# Patient Record
Sex: FEMALE | Race: WHITE | NOT HISPANIC OR LATINO | Employment: FULL TIME | URBAN - METROPOLITAN AREA
[De-identification: names, ages, dates, MRNs, and addresses within clinical notes are randomized per-mention and may not be internally consistent; named-entity substitution may affect disease eponyms.]

---

## 2018-04-24 ENCOUNTER — OFFICE VISIT (OUTPATIENT)
Dept: URGENT CARE | Facility: CLINIC | Age: 33
End: 2018-04-24
Payer: COMMERCIAL

## 2018-04-24 VITALS
SYSTOLIC BLOOD PRESSURE: 120 MMHG | DIASTOLIC BLOOD PRESSURE: 88 MMHG | RESPIRATION RATE: 18 BRPM | WEIGHT: 120 LBS | BODY MASS INDEX: 23.56 KG/M2 | HEART RATE: 85 BPM | OXYGEN SATURATION: 100 % | TEMPERATURE: 98 F | HEIGHT: 60 IN

## 2018-04-24 DIAGNOSIS — N30.01 ACUTE CYSTITIS WITH HEMATURIA: Primary | ICD-10-CM

## 2018-04-24 DIAGNOSIS — T50.905A ADVERSE EFFECT OF DRUG, INITIAL ENCOUNTER: ICD-10-CM

## 2018-04-24 LAB
BILIRUB UR QL STRIP: NEGATIVE
GLUCOSE UR QL STRIP: NEGATIVE
KETONES UR QL STRIP: POSITIVE
LEUKOCYTE ESTERASE UR QL STRIP: POSITIVE
PH, POC UA: 7 (ref 5–8)
POC BLOOD, URINE: NEGATIVE
POC NITRATES, URINE: NEGATIVE
PROT UR QL STRIP: NEGATIVE
SP GR UR STRIP: 1.01 (ref 1–1.03)
UROBILINOGEN UR STRIP-ACNC: POSITIVE (ref 0.1–1.1)

## 2018-04-24 PROCEDURE — 99203 OFFICE O/P NEW LOW 30 MIN: CPT | Mod: 25,S$GLB,, | Performed by: EMERGENCY MEDICINE

## 2018-04-24 PROCEDURE — 81003 URINALYSIS AUTO W/O SCOPE: CPT | Mod: QW,S$GLB,, | Performed by: EMERGENCY MEDICINE

## 2018-04-24 RX ORDER — DEXTROAMPHETAMINE SACCHARATE, AMPHETAMINE ASPARTATE, DEXTROAMPHETAMINE SULFATE AND AMPHETAMINE SULFATE 5; 5; 5; 5 MG/1; MG/1; MG/1; MG/1
20 TABLET ORAL 3 TIMES DAILY
COMMUNITY
Start: 2018-03-27

## 2018-04-24 RX ORDER — ESCITALOPRAM OXALATE 20 MG/1
20 TABLET ORAL DAILY
Refills: 0 | COMMUNITY
Start: 2018-04-06

## 2018-04-24 RX ORDER — CEPHALEXIN 500 MG/1
500 CAPSULE ORAL EVERY 12 HOURS
Qty: 6 CAPSULE | Refills: 0 | Status: SHIPPED | OUTPATIENT
Start: 2018-04-24 | End: 2018-04-27

## 2018-04-24 NOTE — PROGRESS NOTES
Subjective:       Patient ID: Syl Elise is a 32 y.o. female.    Vitals:  height is 5' (1.524 m) and weight is 54.4 kg (120 lb). Her oral temperature is 98.2 °F (36.8 °C). Her blood pressure is 120/88 and her pulse is 85. Her respiration is 18 and oxygen saturation is 100%.     Chief Complaint: Dysuria and Dizziness    Patient states she took leftover BACTRIM from a friend for UTI symptoms. She states her symptoms are better but she has been light headed and fatigue since taking the medication. She reports her father is allergic to Bactrim.       Dysuria    This is a new problem. The current episode started in the past 7 days (Friday). The problem occurs intermittently. The problem has been resolved. The pain is at a severity of 0/10. The patient is experiencing no pain. There has been no fever. She is sexually active. There is no history of pyelonephritis. Associated symptoms include frequency, nausea and urgency. Pertinent negatives include no chills, hematuria, vomiting or rash. She has tried antibiotics and home medications for the symptoms. The treatment provided significant relief.   Fatigue   This is a new problem. The current episode started in the past 7 days (Saturday). The problem occurs constantly. The problem has been unchanged. Associated symptoms include abdominal pain, fatigue, headaches and nausea. Pertinent negatives include no chest pain, chills, fever, rash, sore throat or vomiting. Associated symptoms comments: Dizzy/Lightheaded. Nothing aggravates the symptoms. She has tried nothing for the symptoms.     Review of Systems   Constitution: Positive for fatigue and malaise/fatigue. Negative for chills and fever.   HENT: Negative for sore throat.    Eyes: Negative for blurred vision.   Cardiovascular: Negative for chest pain.   Respiratory: Negative for shortness of breath.    Skin: Negative for itching and rash.   Musculoskeletal: Negative for back pain and joint pain.   Gastrointestinal:  Positive for abdominal pain and nausea. Negative for diarrhea and vomiting.   Genitourinary: Positive for dysuria, frequency and urgency. Negative for genital sores, hematuria, missed menses and non-menstrual bleeding.   Neurological: Positive for headaches.   Psychiatric/Behavioral: The patient is not nervous/anxious.        Objective:      Physical Exam   Constitutional: She is oriented to person, place, and time. She appears well-developed and well-nourished. She is cooperative.  Non-toxic appearance. She does not appear ill. No distress.   HENT:   Head: Normocephalic and atraumatic.   Right Ear: Hearing, tympanic membrane, external ear and ear canal normal.   Left Ear: Hearing, tympanic membrane, external ear and ear canal normal.   Nose: Nose normal. No mucosal edema, rhinorrhea or nasal deformity. No epistaxis. Right sinus exhibits no maxillary sinus tenderness and no frontal sinus tenderness. Left sinus exhibits no maxillary sinus tenderness and no frontal sinus tenderness.   Mouth/Throat: Uvula is midline, oropharynx is clear and moist and mucous membranes are normal. No trismus in the jaw. Normal dentition. No uvula swelling. No posterior oropharyngeal erythema.   Eyes: Conjunctivae and lids are normal. Right eye exhibits no discharge. Left eye exhibits no discharge. No scleral icterus.   Sclera clear bilat   Neck: Trachea normal, normal range of motion, full passive range of motion without pain and phonation normal. Neck supple.   Cardiovascular: Normal rate, regular rhythm, normal heart sounds, intact distal pulses and normal pulses.    Pulmonary/Chest: Effort normal and breath sounds normal. No respiratory distress.   Abdominal: Soft. Normal appearance and bowel sounds are normal. She exhibits no distension, no pulsatile midline mass and no mass. There is no tenderness.   Musculoskeletal: Normal range of motion. She exhibits no edema or deformity.   Neurological: She is alert and oriented to person,  place, and time. She exhibits normal muscle tone. Coordination normal.   Skin: Skin is warm, dry and intact. She is not diaphoretic. No pallor.   Psychiatric: She has a normal mood and affect. Her speech is normal and behavior is normal. Judgment and thought content normal. Cognition and memory are normal.   Nursing note and vitals reviewed.      Assessment:       1. Acute cystitis with hematuria    2. Adverse effect of drug, initial encounter        Plan:         Acute cystitis with hematuria  -     POCT Urinalysis, Dipstick, Automated, W/O Scope  -     cephALEXin (KEFLEX) 500 MG capsule; Take 1 capsule (500 mg total) by mouth every 12 (twelve) hours.  Dispense: 6 capsule; Refill: 0    Adverse effect of drug, initial encounter

## 2018-05-04 ENCOUNTER — OFFICE VISIT (OUTPATIENT)
Dept: URGENT CARE | Facility: CLINIC | Age: 33
End: 2018-05-04
Payer: COMMERCIAL

## 2018-05-04 VITALS
OXYGEN SATURATION: 100 % | HEIGHT: 60 IN | SYSTOLIC BLOOD PRESSURE: 117 MMHG | WEIGHT: 120 LBS | TEMPERATURE: 99 F | BODY MASS INDEX: 23.56 KG/M2 | HEART RATE: 70 BPM | DIASTOLIC BLOOD PRESSURE: 80 MMHG

## 2018-05-04 DIAGNOSIS — R30.0 DYSURIA: Primary | ICD-10-CM

## 2018-05-04 LAB
B-HCG UR QL: NEGATIVE
BILIRUB UR QL STRIP: NEGATIVE
CTP QC/QA: YES
GLUCOSE UR QL STRIP: NEGATIVE
KETONES UR QL STRIP: POSITIVE
LEUKOCYTE ESTERASE UR QL STRIP: NEGATIVE
PH, POC UA: 6.5 (ref 5–8)
POC BLOOD, URINE: POSITIVE
POC NITRATES, URINE: NEGATIVE
PROT UR QL STRIP: NEGATIVE
SP GR UR STRIP: 1.02 (ref 1–1.03)
UROBILINOGEN UR STRIP-ACNC: NORMAL (ref 0.1–1.1)

## 2018-05-04 PROCEDURE — 99214 OFFICE O/P EST MOD 30 MIN: CPT | Mod: 25,S$GLB,, | Performed by: NURSE PRACTITIONER

## 2018-05-04 PROCEDURE — 87086 URINE CULTURE/COLONY COUNT: CPT

## 2018-05-04 PROCEDURE — 81003 URINALYSIS AUTO W/O SCOPE: CPT | Mod: QW,S$GLB,, | Performed by: NURSE PRACTITIONER

## 2018-05-04 PROCEDURE — 81025 URINE PREGNANCY TEST: CPT | Mod: S$GLB,,, | Performed by: NURSE PRACTITIONER

## 2018-05-04 RX ORDER — PHENAZOPYRIDINE HYDROCHLORIDE 200 MG/1
200 TABLET, FILM COATED ORAL 3 TIMES DAILY PRN
Qty: 6 TABLET | Refills: 0 | Status: SHIPPED | OUTPATIENT
Start: 2018-05-04 | End: 2018-05-06

## 2018-05-04 NOTE — PROGRESS NOTES
Subjective:       Patient ID: Syl Elise is a 32 y.o. female.    Vitals:  height is 5' (1.524 m) and weight is 54.4 kg (120 lb). Her temperature is 98.7 °F (37.1 °C). Her blood pressure is 117/80 and her pulse is 70. Her oxygen saturation is 100%.     Chief Complaint: Urinary Tract Infection    Patient presents with c/o dysuria x1 day.  She states that she thinks that she has a UTI.  She also started her menstrual cycle today so she is having some cramping, but this is normal for her.  She denies vaginal discharge, frequency, urgency, flank pain, fever, N/V.  She does not have an OBGYN.  She completed Keflex for a UTI 7 days ago.  She has not been on any antibiotics since.  She is not concerned with an STD as she states that she had a full panel less than 2 months ago.        Urinary Tract Infection    This is a new problem. The current episode started yesterday. The problem occurs every urination. The problem has been unchanged. The quality of the pain is described as burning. The pain is at a severity of 5/10. The pain is mild. There has been no fever. She is sexually active. There is no history of pyelonephritis. Pertinent negatives include no behavior changes, chills, discharge, flank pain, frequency, hematuria, nausea, possible pregnancy, urgency, vomiting or rash. She has tried antibiotics for the symptoms. The treatment provided mild relief. Her past medical history is significant for recurrent UTIs. There is no history of kidney stones.     Review of Systems   Constitution: Negative for chills, decreased appetite and fever.   Skin: Negative for itching and rash.   Musculoskeletal: Negative for back pain.   Gastrointestinal: Negative for abdominal pain, nausea and vomiting.   Genitourinary: Positive for dysuria and pelvic pain (associated with her period). Negative for flank pain, frequency, genital sores, hematuria, missed menses, non-menstrual bleeding and urgency.       Objective:      Physical Exam    Constitutional: She is oriented to person, place, and time. Vital signs are normal. She appears well-developed and well-nourished.  Non-toxic appearance. She does not appear ill. No distress.   HENT:   Head: Normocephalic and atraumatic.   Right Ear: External ear normal.   Left Ear: External ear normal.   Nose: Nose normal. No nasal deformity. No epistaxis.   Mouth/Throat: Oropharynx is clear and moist and mucous membranes are normal.   Eyes: Conjunctivae and lids are normal.   Neck: Trachea normal, normal range of motion and phonation normal. Neck supple.   Cardiovascular: Normal rate, regular rhythm, normal heart sounds and normal pulses.    Pulmonary/Chest: Effort normal and breath sounds normal.   Abdominal: Soft. Normal appearance and bowel sounds are normal. She exhibits no distension and no mass. There is no tenderness. There is no rigidity, no rebound, no guarding and no CVA tenderness.   Genitourinary:   Genitourinary Comments: Patient deferred   Neurological: She is alert and oriented to person, place, and time.   Skin: Skin is warm, dry and intact.   Psychiatric: She has a normal mood and affect. Her speech is normal and behavior is normal. Cognition and memory are normal.   Nursing note and vitals reviewed.      Assessment:       1. Dysuria        Plan:       Dysuria likely due to starting her menstrual cycle.  Will run urine culture.  Pyridium for symptoms.  Advised to increase water intake and avoid irritants such as body soaps, alcohol, caffeine, and spicy foods.  Follow up with OBGYN for continued symptoms for pelvic exam and possibly urology.  Dysuria  -     POCT Urinalysis, Dipstick, Automated, W/O Scope  -     POCT URINE PREGNANCY  -     phenazopyridine (PYRIDIUM) 200 MG tablet; Take 1 tablet (200 mg total) by mouth 3 (three) times daily as needed for Pain.  Dispense: 6 tablet; Refill: 0  -     Urine culture  -     Ambulatory Referral to Gynecology      Patient Instructions                                 If your condition worsens or fails to improve we recommend that you receive another evaluation at the ER immediately or contact your PCP to discuss your concerns or return here. You must understand that you've received an urgent care treatment only and that you may be released before all your medical problems are known or treated. You the patient will arrange for followup care as instructed.   If you had cultures done it will take 3-5 days to result. We will call you with the result.   Cranberry juice may help. Get the 100% cranberry juice and mix 4 oz of juice with 4 oz of water and drink this 8 oz glass of liquid once a day.   Increase water intake to at least 8-10 glasses/day.  Do not wear contacts with Pyridium as it will stain them.  You may want to wear a panty liner with Pyridium as it may stain your underwear.  Avoid caffeine, alcohol, or spicy foods as they irritate the bladder.        Dysuria with Uncertain Cause (Adult)    The urethra is the tube that allows urine to pass out of the body. In a woman, the urethra is the opening above the vagina. In men, the urethra is the opening on the tip of the penis. Dysuria is the feeling of pain or burning in the urethra when passing urine.  Dysuria can be caused by anything that irritates or inflames the urethra. An infection or chemical irritation can cause this reaction. A bladder infection is the most common cause of dysuria in adults. A urine test can diagnose this. A bladder infection needs antibiotic treatment.  Soaps, lotions, colognes and feminine hygiene products can cause dysuria. So can birth control jellies, creams, and foams. It will go away 1 to 3 days after using these irritants.  Sexually transmitted diseases (STDs) such as chlamydia or gonorrhea can cause dysuria. Your healthcare provider may take a culture sample. Your provider may start you on antibiotic medicine before the culture test returns.  In women who have gone through menopause,  dysuria can be from dryness in the lining of the urethra. This can be treated with hormones. Dysuria becomes long-term (chronic) when it lasts for weeks or months. You may need to see a specialist (urologist) to diagnose and treat chronic dysuria.  Home care  These home care tips may help:  · Don't use any chemicals or products that you think may be causing your symptoms.  · If you were given a prescription medicine, take as directed. Be sure to take it until it is all used up.  · If a culture was taken, don't have sex until you have been told that it is negative. This means you don't have an infection. Then follow your healthcare provider's advice to treat your condition.  If a culture was done and it is positive:  · Both you and your sexual partner may need to be treated. This is true even if your partner has no symptoms.  · Contact your healthcare provider or go to an urgent care clinic or the public health department to be looked at and treated.  · Don't have sex until both you and your partner(s) have finished all antibiotics and your healthcare provider says you are no longer contagious.  · Learn about and use safe sex practices. The safest sex is with a partner who has tested negative and only has sex with you. Condoms can prevent STDs from spreading, but they aren't a guarantee.  Follow-up care  Follow up with your healthcare provider, or as advised. If a culture was taken, you may call as directed for the results. If you have an STD, follow up with your provider or the public health department for a complete STD screening, including HIV testing. For more information, contact CDC-INFO at 455-189-0431.  When to seek medical advice  Call your healthcare provider right away if any of these occur:  · You aren't better after 3 days of treatment  · Fever of 100.4ºF (38ºC) or higher, or as directed by your healthcare provider  · Back or belly pain that gets worse  · You can't urinate because of pain  · New  discharge from the urethra, vagina, or penis  · Painful sores on the penis  · Rash or joint pain  · Painful lumps (lymph nodes) in the groin  · Testicle pain or swelling of the scrotum  Date Last Reviewed: 11/1/2016  © 7093-7317 [employer]. [address] [place] [address]. All rights reserved. This information is not intended as a substitute for professional medical care. Always follow your healthcare professional's instructions.

## 2018-05-04 NOTE — PATIENT INSTRUCTIONS
If your condition worsens or fails to improve we recommend that you receive another evaluation at the ER immediately or contact your PCP to discuss your concerns or return here. You must understand that you've received an urgent care treatment only and that you may be released before all your medical problems are known or treated. You the patient will arrange for followup care as instructed.   If you had cultures done it will take 3-5 days to result. We will call you with the result.   Cranberry juice may help. Get the 100% cranberry juice and mix 4 oz of juice with 4 oz of water and drink this 8 oz glass of liquid once a day.   Increase water intake to at least 8-10 glasses/day.  Do not wear contacts with Pyridium as it will stain them.  You may want to wear a panty liner with Pyridium as it may stain your underwear.  Avoid caffeine, alcohol, or spicy foods as they irritate the bladder.        Dysuria with Uncertain Cause (Adult)    The urethra is the tube that allows urine to pass out of the body. In a woman, the urethra is the opening above the vagina. In men, the urethra is the opening on the tip of the penis. Dysuria is the feeling of pain or burning in the urethra when passing urine.  Dysuria can be caused by anything that irritates or inflames the urethra. An infection or chemical irritation can cause this reaction. A bladder infection is the most common cause of dysuria in adults. A urine test can diagnose this. A bladder infection needs antibiotic treatment.  Soaps, lotions, colognes and feminine hygiene products can cause dysuria. So can birth control jellies, creams, and foams. It will go away 1 to 3 days after using these irritants.  Sexually transmitted diseases (STDs) such as chlamydia or gonorrhea can cause dysuria. Your healthcare provider may take a culture sample. Your provider may start you on antibiotic medicine before the culture test returns.  In women who have gone  through menopause, dysuria can be from dryness in the lining of the urethra. This can be treated with hormones. Dysuria becomes long-term (chronic) when it lasts for weeks or months. You may need to see a specialist (urologist) to diagnose and treat chronic dysuria.  Home care  These home care tips may help:  · Don't use any chemicals or products that you think may be causing your symptoms.  · If you were given a prescription medicine, take as directed. Be sure to take it until it is all used up.  · If a culture was taken, don't have sex until you have been told that it is negative. This means you don't have an infection. Then follow your healthcare provider's advice to treat your condition.  If a culture was done and it is positive:  · Both you and your sexual partner may need to be treated. This is true even if your partner has no symptoms.  · Contact your healthcare provider or go to an urgent care clinic or the public health department to be looked at and treated.  · Don't have sex until both you and your partner(s) have finished all antibiotics and your healthcare provider says you are no longer contagious.  · Learn about and use safe sex practices. The safest sex is with a partner who has tested negative and only has sex with you. Condoms can prevent STDs from spreading, but they aren't a guarantee.  Follow-up care  Follow up with your healthcare provider, or as advised. If a culture was taken, you may call as directed for the results. If you have an STD, follow up with your provider or the public health department for a complete STD screening, including HIV testing. For more information, contact CDC-INFO at 338-829-7077.  When to seek medical advice  Call your healthcare provider right away if any of these occur:  · You aren't better after 3 days of treatment  · Fever of 100.4ºF (38ºC) or higher, or as directed by your healthcare provider  · Back or belly pain that gets worse  · You can't urinate because of  pain  · New discharge from the urethra, vagina, or penis  · Painful sores on the penis  · Rash or joint pain  · Painful lumps (lymph nodes) in the groin  · Testicle pain or swelling of the scrotum  Date Last Reviewed: 11/1/2016  © 4322-8685 [employer]. [address] [place] [address]. All rights reserved. This information is not intended as a substitute for professional medical care. Always follow your healthcare professional's instructions.

## 2018-05-07 LAB — BACTERIA UR CULT: NORMAL

## 2018-05-09 ENCOUNTER — TELEPHONE (OUTPATIENT)
Dept: URGENT CARE | Facility: CLINIC | Age: 33
End: 2018-05-09

## 2018-05-14 ENCOUNTER — OFFICE VISIT (OUTPATIENT)
Dept: OBSTETRICS AND GYNECOLOGY | Facility: CLINIC | Age: 33
End: 2018-05-14
Attending: OBSTETRICS & GYNECOLOGY
Payer: COMMERCIAL

## 2018-05-14 VITALS
HEIGHT: 60 IN | DIASTOLIC BLOOD PRESSURE: 70 MMHG | SYSTOLIC BLOOD PRESSURE: 110 MMHG | BODY MASS INDEX: 21.03 KG/M2 | WEIGHT: 107.13 LBS

## 2018-05-14 DIAGNOSIS — N39.0 RECURRENT UTI: ICD-10-CM

## 2018-05-14 DIAGNOSIS — R30.0 DYSURIA: ICD-10-CM

## 2018-05-14 DIAGNOSIS — N39.0 UTI (URINARY TRACT INFECTION), UNCOMPLICATED: ICD-10-CM

## 2018-05-14 DIAGNOSIS — Z01.411 ENCOUNTER FOR GYNECOLOGICAL EXAMINATION WITH ABNORMAL FINDING: Primary | ICD-10-CM

## 2018-05-14 PROCEDURE — 99999 PR PBB SHADOW E&M-EST. PATIENT-LVL III: CPT | Mod: PBBFAC,,, | Performed by: OBSTETRICS & GYNECOLOGY

## 2018-05-14 PROCEDURE — 99203 OFFICE O/P NEW LOW 30 MIN: CPT | Mod: S$GLB,,, | Performed by: OBSTETRICS & GYNECOLOGY

## 2018-05-14 PROCEDURE — 87086 URINE CULTURE/COLONY COUNT: CPT

## 2018-05-14 PROCEDURE — 87491 CHLMYD TRACH DNA AMP PROBE: CPT

## 2018-05-14 PROCEDURE — 81001 URINALYSIS AUTO W/SCOPE: CPT

## 2018-05-14 RX ORDER — VALACYCLOVIR HYDROCHLORIDE 500 MG/1
500 TABLET, FILM COATED ORAL DAILY
Refills: 0 | COMMUNITY
Start: 2018-05-08 | End: 2018-05-14

## 2018-05-14 RX ORDER — NITROFURANTOIN 25; 75 MG/1; MG/1
100 CAPSULE ORAL 2 TIMES DAILY
Refills: 0 | COMMUNITY
Start: 2018-05-08 | End: 2018-06-15

## 2018-05-14 NOTE — PROGRESS NOTES
Syl Elise is a 32 y.o. female patient  NEW TO ME who presents today with complaints of recurrent UTI's since November. She has had a UTI in Late November and treated with Macrobid, then had another one in mid April for which she was seen at an urgent care clinic, and treated with Bactrim. After about 3 days, she stopped the Bactrim due to muscle aches and feeling weird. She was then switched to Keflex for the completion of the treatment. She then developed symptoms of burning with urination, aches and fullness /pressure in the bladder a week ago, and she was given a prescription for macrobid by her friend's mother . She is on her last day of Macrobid for the treatment.     Patient's last menstrual period was 2018.    Past Medical History:   Diagnosis Date    Abnormal Pap smear of cervix     LEEP done     Past Surgical History:   Procedure Laterality Date    CERVICAL BIOPSY  W/ LOOP ELECTRODE EXCISION       Social History     Social History    Marital status: Single     Spouse name: N/A    Number of children: N/A    Years of education: N/A     Occupational History    Not on file.     Social History Main Topics    Smoking status: Former Smoker    Smokeless tobacco: Former User    Alcohol use Yes      Comment: 1 drink a week sometimes     Drug use: No    Sexual activity: Yes     Partners: Male     Birth control/ protection: Condom      Comment: current partner x 1 month     Other Topics Concern    Not on file     Social History Narrative    No narrative on file     Family History   Problem Relation Age of Onset    Diabetes Maternal Grandmother     Hypertension Mother     Breast cancer Neg Hx     Colon cancer Neg Hx     Ovarian cancer Neg Hx      OB History      Para Term  AB Living    0 0 0 0 0 0    SAB TAB Ectopic Multiple Live Births    0 0 0 0 0                ROS:  GENERAL: Feeling well overall.   SKIN: Denies rash or lesions.   HEAD: Denies head injury or  headache.   NODES: Denies enlarged lymph nodes.   CHEST: Denies chest pain or shortness of breath.   CARDIOVASCULAR: Denies palpitations or left sided chest pain.   ABDOMEN: No abdominal pain, nausea, vomiting or rectal bleeding.   URINARY: No dysuria, hematuria, or burning on urination.  REPRODUCTIVE: See HPI.   BREASTS: Denies pain, lumps, or nipple discharge.   HEMATOLOGIC: No easy bruisability or excessive bleeding.   MUSCULOSKELETAL: Denies joint pain or swelling.   NEUROLOGIC: Denies syncope or weakness.   PSYCHIATRIC: Denies depression.    /70   Ht 5' (1.524 m)   Wt 48.6 kg (107 lb 2.3 oz)   LMP 05/04/2018   BMI 20.93 kg/m²     PE:   APPEARANCE: Well nourished, well developed, in no acute distress.  ABDOMEN: Soft. No tenderness or masses. No hernias. No CVA tenderness.  VULVA: No lesions. Normal female genitalia.  URETHRAL MEATUS: Normal size and location, no lesions, no prolapse.  URETHRA: No masses, tenderness, prolapse or scarring.  VAGINA: Moist and well rugated, no discharge, no significant cystocele or rectocele.  CERVIX: No lesions and discharge.   UTERUS: Normal size, regular shape, mobile, non-tender, bladder base  MILDLY tender.  ADNEXA: No masses, tenderness or CDS nodularity.  ANUS PERINEUM: Normal.    PROCEDURES:    1. Encounter for gynecological examination with abnormal finding     2. UTI (urinary tract infection), uncomplicated     3. Dysuria  Urine culture    Urinalysis    C. trachomatis/N. gonorrhoeae by AMP DNA Cervix   4. Recurrent UTI  Ambulatory consult to Urogynecology    AND PLAN:      Follow-up if symptoms worsen or fail to improve.

## 2018-05-15 LAB
BACTERIA #/AREA URNS AUTO: ABNORMAL /HPF
BILIRUB UR QL STRIP: NEGATIVE
C TRACH DNA SPEC QL NAA+PROBE: NOT DETECTED
CLARITY UR REFRACT.AUTO: ABNORMAL
COLOR UR AUTO: ABNORMAL
GLUCOSE UR QL STRIP: NEGATIVE
HGB UR QL STRIP: NEGATIVE
KETONES UR QL STRIP: ABNORMAL
LEUKOCYTE ESTERASE UR QL STRIP: NEGATIVE
MICROSCOPIC COMMENT: ABNORMAL
N GONORRHOEA DNA SPEC QL NAA+PROBE: NOT DETECTED
NITRITE UR QL STRIP: NEGATIVE
PH UR STRIP: 5 [PH] (ref 5–8)
PROT UR QL STRIP: NEGATIVE
RBC #/AREA URNS AUTO: 21 /HPF (ref 0–4)
SP GR UR STRIP: 1.03 (ref 1–1.03)
SQUAMOUS #/AREA URNS AUTO: 4 /HPF
URN SPEC COLLECT METH UR: ABNORMAL
UROBILINOGEN UR STRIP-ACNC: NEGATIVE EU/DL
WBC #/AREA URNS AUTO: 8 /HPF (ref 0–5)

## 2018-05-16 LAB — BACTERIA UR CULT: NORMAL

## 2018-05-17 ENCOUNTER — PATIENT MESSAGE (OUTPATIENT)
Dept: OBSTETRICS AND GYNECOLOGY | Facility: CLINIC | Age: 33
End: 2018-05-17

## 2018-05-17 DIAGNOSIS — N39.0 RECURRENT UTI: Primary | ICD-10-CM

## 2018-05-18 ENCOUNTER — TELEPHONE (OUTPATIENT)
Dept: UROGYNECOLOGY | Facility: CLINIC | Age: 33
End: 2018-05-18

## 2018-06-15 ENCOUNTER — LAB VISIT (OUTPATIENT)
Dept: LAB | Facility: HOSPITAL | Age: 33
End: 2018-06-15
Attending: OBSTETRICS & GYNECOLOGY
Payer: COMMERCIAL

## 2018-06-15 ENCOUNTER — INITIAL CONSULT (OUTPATIENT)
Dept: UROGYNECOLOGY | Facility: CLINIC | Age: 33
End: 2018-06-15
Attending: OBSTETRICS & GYNECOLOGY
Payer: COMMERCIAL

## 2018-06-15 VITALS
SYSTOLIC BLOOD PRESSURE: 100 MMHG | WEIGHT: 108.25 LBS | BODY MASS INDEX: 21.25 KG/M2 | HEIGHT: 60 IN | DIASTOLIC BLOOD PRESSURE: 72 MMHG

## 2018-06-15 DIAGNOSIS — R30.0 DYSURIA: ICD-10-CM

## 2018-06-15 DIAGNOSIS — R39.15 URINARY URGENCY: ICD-10-CM

## 2018-06-15 DIAGNOSIS — R35.0 URINARY FREQUENCY: ICD-10-CM

## 2018-06-15 DIAGNOSIS — M79.18 MYALGIA OF PELVIC FLOOR: ICD-10-CM

## 2018-06-15 DIAGNOSIS — F32.A ANXIETY AND DEPRESSION: ICD-10-CM

## 2018-06-15 DIAGNOSIS — F41.9 ANXIETY AND DEPRESSION: ICD-10-CM

## 2018-06-15 DIAGNOSIS — R30.0 DYSURIA: Primary | ICD-10-CM

## 2018-06-15 DIAGNOSIS — Z87.440 HISTORY OF UTI: ICD-10-CM

## 2018-06-15 PROCEDURE — 99999 PR PBB SHADOW E&M-EST. PATIENT-LVL IV: CPT | Mod: PBBFAC,,, | Performed by: OBSTETRICS & GYNECOLOGY

## 2018-06-15 PROCEDURE — 87086 URINE CULTURE/COLONY COUNT: CPT

## 2018-06-15 PROCEDURE — 99245 OFF/OP CONSLTJ NEW/EST HI 55: CPT | Mod: 25,S$GLB,, | Performed by: OBSTETRICS & GYNECOLOGY

## 2018-06-15 PROCEDURE — 87798 DETECT AGENT NOS DNA AMP: CPT | Mod: 91

## 2018-06-15 PROCEDURE — 51701 INSERT BLADDER CATHETER: CPT | Mod: S$GLB,,, | Performed by: OBSTETRICS & GYNECOLOGY

## 2018-06-15 PROCEDURE — 87798 DETECT AGENT NOS DNA AMP: CPT

## 2018-06-15 RX ORDER — FEXOFENADINE HCL 60 MG
60 TABLET ORAL DAILY
COMMUNITY
End: 2018-09-14

## 2018-06-15 NOTE — LETTER
Sana 15, 2018      Carol Hays MD  [address]  [address]  [facility] [address]           [facility]  [address]  [facility] [address]  Phone: [phone]          Patient: Syl Elise   MR Number: [id number]   YOB: 1985   Date of Visit: 6/15/2018       Dear Dr. Carol Hays:    Thank you for referring Syl Elise to me for evaluation. Attached you will find relevant portions of my assessment and plan of care.    If you have questions, please do not hesitate to call me. I look forward to following Syl Elise along with you.    Sincerely,    Romulo Neville MD    Enclosure  CC:  No Recipients    If you would like to receive this communication electronically, please contact [email or web address] or [phone] to request more information on [employer] access.    For providers and/or their staff who would like to refer a patient to [facility], please contact us through our one-stop-shop provider referral line, [facility] , at [phone].    If you feel you have received this communication in error or would no longer like to receive these types of communications, please e-mail [email or web address]

## 2018-06-15 NOTE — PATIENT INSTRUCTIONS
UTI PREVENTION: (from [employer] Continence)  Urinary Tract Infection (UTI)  More than 4 million doctor office visits per year in the [place] are for urinary tract infections (UTI). About 12% of men and 50% of women will have a UTI during his or her lifetime. Most UTIs arise from bacteria that normally live in the colon and rectum and are present in bowel movements. These bacteria cling to the opening of the urethra, begin to multiply, & travel up to the bladder. Urine flow from the bladder usually washes bacteria out of the body. However, because women have a shorter urethra than men, bacteria can reach the bladder more easily and settle into the bladder wall. This is why women are more likely to develop UTIs than men. This risk factor is exacerbated by the greater likelihood that women introduce bacteria from fecal matter, following a bowel movement, into the urinary tract. Less often, bacteria can spread to the kidney from the bloodstream. A recurrent UTI is classified as three or more a year.  Risk Factors for UTI  Several factors can contribute to the risk of UTI. Sexual intercourse, use of contraceptive spermicide, low levels of estrogen, catheterization, diabetes, pregnancy, and immune suppression increase susceptibility to UTI.  Naturally occurring estrogen helps prevent recurrent UTI in women. After menopause, estrogen levels drop along with the number of vaginal lactobacilli, the good bacteria which prevent growth of intestinal bacteria in the vagina. This makes postmenopausal women especially susceptible to UTI.  Catheters also present a risk of recurring UTI. Catheters are associated with colonization of bacteria and increased risks of clinical infection. Using the techniques described previously can help keep catheters clean and prevent recurrent UTI. While single-use of sterile catheters reduces the risks, it does not prevent UTI from occurring. It is therefore important to  maintain proper care and use of catheters at all times while remaining alert to symptoms of UTI.  Common Symptoms of UTI   Painful urination   Frequency   Urgency   Lower abdominal or pelvic pain or pressure   Blood in the urine   Milky, cloudy, or pink/red urine   Fever   Strong smelling urine  In the elderly populations, symptoms of a urinary tract infection, can be easily overlooked, causing a delay in diagnosis. Elderly people with a UTI are more likely than younger people not to be diagnosed until the complication of sepsis occurs. The elderly often do not experience or report obvious symptoms that younger people have, such as difficult urination, or frequent urination. Instead they may exhibit far more vague symptoms that are similiar to many disease or may be assumed to be due to the aging process. These symptoms include: confusion, feelings of general discomfort, disorientation, fatigue, weakness, behavior changes, falling, and/or a new, acute incontinence. In addition, because incontinence is common in the elderly, they may not be aware of the symptom of frequency. If you experience any of these symptoms, see your healthcare professional.  Types of UTIs   Cystitis - an inflammation of the bladder and the most common UTI. Almost always, it occurs in women. The infection typically affects only the surface of the bladder and is brief & acute.   Pyelonephritis - more commonly known as a kidney infection and usually occurs when a lower UTI spreads to the kidneys. Occasionally, bacteria will spread to the kidney from the bloodstresam. Kidney infections are more serious and less common than bladder infections. Symptoms include a fever, pain in the back or side below the ribs, nausea, or vomiting.   Urethritis - is an inflammation of the urethra. Men commonly contract urethritis through sexual intercourse with partners infected with sexually transmitted infections. Urethritis may also result from trauma  to the area or from the catheterization process.  Prevention of UTI  There are several ways to prevent bladder infections.  Bathroom Behavior:Periodically emptying the bladder by trying to urinate every two to three hours.  Diet:The use of cranberry products seems to decrease the ability of bacteria to adhere to the lining of the urethra and bladder. As cranberry juice can have a high amount of sugar, cranberry extract can be taken in capsule or pill form instead. Increasing water intake by one or two glasses per day may help limit the length of time that you have symptoms and reduce the infections.  Hygiene: Proper hygiene in caring for the urethral area is another way to limit the amount or type of bacteria that can be drawn into the urethra. This is especially true in people who have decreased sensation in the perineal region such as those with MS or who experience any amount of fecal incontinence. Using soap & water or commercially available cleansing wipes several times per day & frequent changing of incontinence pads as they become wet can minimize the amount of bacteria in the urethral area. Women should always wipe from the front of the vagina to the back of the anus after urination or a bowel movement and wear cotton underwear. It is also reported that wearing thong undergarments may increase one's risk of developing an infection.  Sexual Activity: Can be the source of UTIs in women. Insuring proper lubrication to the vagina and voiding before and after intercourse are tactics to help prevent infection. Using diaphragms and spermicidal jelly and/or foam may increase the risk of infection, so it is important to evaluate what type of birth control you use. Some physicians encourage women who have a history of recurrent infections to take an prophylactic antibiotic after intercourse, as it reduces risk of recurrent UTI by about 85%. At a minimum, restrict your number of sexual partners and urinate  immediately after sexual intimacy to lower the risk of recurrent UTIs.  Vaginal Estrogen: reduces risk of recurrent UTI by repopulating the normal vaginal lactobacilli that keep bacteria from the rectum from multiplying and causing a bladder infection. Forms of vaginal estrogen are available at very low dosages that have minimal systemic absorption.  Catheter Use: proper cleansing and storage of catheters is important in one who intermittently catheterizes, as the catheter can be a vehicle to introduce infection if the technique is incorrect or if the catheters are not properly cleaned between each use. A closed system catheter provides a reliable means of sterile IC because the introducer tip is surrounded by a urine collection bag and never exposed to bacteria typically found at the urethral opening. This greatly reduces the risk of infection.  NOTE: Vaginal estrogens and antibiotics are medications that need to be prescribed by your healthcare provider.  Treatment of UTI  Depending on the severity of infection, UTI can be treated with oral antibiotics. A three-day course of antibiotics can treat a simple UTI. However, some infections may need to be treated for several days or weeks. Length of antibiotic treatment also depends on the type of antibiotic prescribed.  Even if a few doses of medication relieve some symptoms, you should still complete the full course of medication prescribed by your doctor. Taking aspirin, Tylenol, or non-steroidal, anti-inflammatory medications may reduce symptoms during this episode, as they may be a result of increased body temperature.  For more information regarding UTIs please visit: http://www.ncbi.nlm.nih.gov/pubmedhealth/GEY6214221/    ---------------------------------------------------------------------    Bladder Irritants  Certain foods and drinks have been associated with worsening symptoms of urinary frequency, urgency, urge incontinence, or bladder pain. If you  suffer from any of these conditions, you may wish to try eliminating one or more of these foods from your diet and see if your symptoms improve. If bladder symptoms are related to dietary factors, strict adherence to a diet thateliminates the food should bring marked relief in 10 days. Once you are feeling better, you can begin to add foods back into your diet, one at a time. If symptoms return, you will be able to identify the irritant. As you add foods back to your diet it is very important that you drink significant amounts of water.    -----------------------------------------------------------------------------------------------  List of Common Bladder Irritants*  Alcoholic beverages  Apples and apple juice  Cantaloupe  Carbonated beverages  Chili and spicy foods  Chocolate  Citrus fruit  Coffee (including decaffeinated)  Cranberries and cranberry juice  Grapes  Guava  Milk Products: milk, cheese, cottage cheese, yogurt, ice cream  Peaches  Pineapple  Plums  Strawberries  Sugar especially artificial sweeteners, saccharin, aspartame, corn sweeteners, honey, fructose, sucrose, lactose  Tea  Tomatoes and tomato juice  Vitamin B complex  Vinegar  *Most people are not sensitive to ALL of these products; your goal is to find the foods that make YOUR symptoms worse.  ---------------------------------------------------------------------------------------------------    Low-acid fruit substitutions include apricots, papaya, pears and watermelon. Coffee drinkers can drink Kava or other lowacid instant drinks. Tea drinkers can substitute non-citrus herbal and sun brewed teas. Calcium carbonate co-buffered with calcium ascorbate can be substituted for Vitamin C. Prelief is a dietary supplement that works as an acid blocker for the bladder.    Where to get more information:        Overcoming Bladder Disorders by Gayel Bryson and Ira Rogers   [facility], 1990        You Dont Have to Live with Cystitis!  By Yuliana Carrizales, 1988  · http://www.urologymanagement.org/oab    -----------------------------------------------------------------------    1)  Frequent UTIs (bladder infections):  --urine C&S sent today  --ureaplasma/myoplasma sent  --If you feel like you have a UTI, please call our office so that we can place an order for you to drop off a urine specimen at the closest [facility] lab (we will arrange).     --We will call in antibiotics for you to start right after you drop off specimen.     --In this way, we can determine:     1)  Do you have a UTI?      2) If you have a UTI, is it sensitive to the antibiotics we prescribed?   FOR FLARES:  Can take uribel up to 4x/day x 5 days.  If persists past that, call us.   --follow UTI prevention tips (see attached)  --control bowel movements/fecal cross-contamination  --empty bladder before and after intercourse  --start taking 1 probiotic pill (any with lactobacillus and/or acidophilus) daily  --consider need for further evaluation (pelvic imaging and cystoscopy) if issue persists    If Irritative voiding symptoms:  --Empty bladder every 3 hours.  Empty well: wait a minute, lean forward on toilet.    --Avoid dietary irritants (see sheet).  Keep diary x 3-5 days to determine your irritants.  --start pelvic floor PT:    [facility]:  1)  Adele Bassett ([facility]): (p) [phone]. (f) [phone]. Established patients call:  [phone]  2)  Lauren Shepley ([facility]  (p) [phone].  Or [phone].   NON-[facility]:  3)  Francisca Mireles ([facility]): (p) [phone].  (f) [phone].    --URGE: consider medication daily (anticholinergic).  Takes 2-4 weeks to see if will have effect.  For dry mouth: get sour, sugar free lozenge or gum.    --control stress/anxiety, cognitive behavioral therapy:    [facility] (ART)  **Lorrie Waters  [address] [place]    [address] [phone]  [email or web address]    [facility]:  Trinity Abarca -    [phone]     [facility]:  Dr. Billingsley Head-Geraldine -    [phone]     Bassam Milian [facility], [facility]  (p) [phone]  [address] [place]    [address]    Delmy Perez [facility]  (p) [phone]  [address]   [facility] [address]  [place]     [address]    Aaliyah Lorenzo and her cell phone number is [phone].     [facility] [phone].      Dr. Balaji [facility]  (p) [phone]  [address].   [address]  [place]     [address]  Website:  [email or web address]    [facility]  Mental Health Facility in [place], [place]  Address:  [address] [place] [address]  phone: [phone]    3)  RTC 3 months.

## 2018-06-15 NOTE — PROGRESS NOTES
[facility] [address] [place] [address]    Syl Elise  [id number]  1985  Sana 15, 2018    Consulting Physician: Carol Hays MD   GYN: MD Saúl  Primary M.D.: Primary Doctor No    Chief Complaint   Patient presents with    Consult     Recurrent UTI       HPI:     1)  Reported frequent UTIs:  Per GYN note 5/14/18:  complaints of recurrent UTI's since November. She has had a UTI in Late November 2017 ([facility] in [place]) and treated with Macrobid--not clear if C&S sent, then had another one in mid April for which she was seen at an urgent care clinic--doesn't sound like C&S sent, and treated with Bactrim. After about 3 days, she stopped the Bactrim due to muscle aches and feeling weird. She was then switched to Keflex for the completion of the treatment.  Was then seen by UC for persistent UTI symptoms, but she was told she didn't have UTI per urine dip.  Was Rx'd pyridium--helped symptoms, except for fullness.  She was given a prescription for macrobid by her friend's mother.   5/14/18 urine C&S NEG. 5/14/18 chlam/caleb NEG.   --typical symptoms:  Burning at end of urination x 1 day, then increased U/F/bladder aching and fullness, urine malodor; no blood in urine  --no previous h/o UTIs   --no symptoms today  --inciting factors: intercourse; 2nd UTI was day before menses; happened after not sleeping well x 2 nights (has some chronic insomnia)  --has been trying to increase hydration, emptying bladder before/after intercourse  --anxiety/depression:  Has some baseline stress.  Feels that lexapro is helping with depression.  Thinks she needs to see a therapist.    --no baseline constipation/diarrhea; can have some stomach upset, laurel after ABX  --no UI; did have a habit of holding bladder for long periods--trying to void more frequently    Past Medical History  Past Medical History:   Diagnosis Date    Abnormal Pap smear of cervix 2004    LEEP done      Past  Surgical History  Past Surgical History:   Procedure Laterality Date    CERVICAL BIOPSY  W/ LOOP ELECTRODE EXCISION      EYE SURGERY  ,       Hysterectomy: No    Past Ob History      Gynecologic History  LMP: Patient's last menstrual period was 2018 (exact date).  Age of menarche: 12 yo  Age of menopause: NA  Menstrual history: monthly; has some dysmenorrhea x 1st 2 days, ibuprofen relieves; does have some MS  Pap test: 3/2018 ([facility]), normal per report.  History of abnormal paps: Yes - s/p LEEP ().  History of STIs:  No  Mammogram: none  Colonoscopy: Date of last:  (for stomach upset + EGD).  Result:  Normal per report.  Repeat due:  42 yo.    DEXA: none    Family History  Family History   Problem Relation Age of Onset    Diabetes Maternal Grandmother     Hypertension Mother     Breast cancer Neg Hx     Colon cancer Neg Hx     Ovarian cancer Neg Hx       Colon CA: No  Breast CA: No  GYN CA: No   CA: No    Social History  History   Smoking Status    Former Smoker   Smokeless Tobacco    Former User   --smoking history: a few weeks in     History   Alcohol Use    Yes     Comment: 1 drink a week sometimes    .    History   Drug Use No     The patient is single.  Resides in [place] [address].  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     Allergies  Review of patient's allergies indicates:   Allergen Reactions    Bactrim [sulfamethoxazole-trimethoprim] Other (See Comments)     Skin tingling, muscle aches/weakness       Medications  Current Outpatient Prescriptions on File Prior to Visit   Medication Sig Dispense Refill    dextroamphetamine-amphetamine (ADDERALL) 20 mg tablet 20 mg 3 (three) times daily.       escitalopram oxalate (LEXAPRO) 20 MG tablet Take 20 mg by mouth once daily.  0    [DISCONTINUED] nitrofurantoin, macrocrystal-monohydrate, (MACROBID) 100 MG capsule Take 100 mg by mouth 2 (two) times daily.  0     No current  facility-administered medications on file prior to visit.        Review of Systems A 14 point ROS was reviewed with pertinent positives as noted above in the history of present illness.      Constitutional: negative  Eyes: negative  Endocrine: negative  Gastrointestinal: negative  Cardiovascular: negative  Respiratory: negative  Allergic/Immunologic: negative  Integumentary: negative  Psychiatric: negative  Musculoskeletal: negative   Ear/Nose/Throat: negative  Neurologic: negative  Genitourinary: SEE HPI  Hematologic/Lymphatic: negative   Breast: negative    Urogynecologic Exam  /72 (BP Location: Right arm, Patient Position: Sitting, BP Method: Medium (Manual))   Ht 5' (1.524 m)   Wt 49.1 kg (108 lb 3.9 oz)   LMP 05/31/2018 (Exact Date)   BMI 21.14 kg/m²     GENERAL APPEARANCE:  The patient is well-developed, well-nourished.  Neck:  Supple with no thyromegaly, no carotid bruits.  Heart:  Regular rate and rhythm, no murmurs, rubs or gallops.  Lungs:  Clear.  No CVA tenderness.  Abdomen:  Soft, nontender, nondistended, no hepatosplenomegaly.  Incisions:  none    PELVIC:    External genitalia:  Normal Bartholins, Skenes and labia bilaterally.    Urethra:  No caruncle, diverticulum or masses.  (+) hypermobility.    Vagina:  Atrophy (--) , no bladder masses or tender, no discharge.  Mild TTP LV.    Cervix:  normal appearance  Uterus: uterus absent  Adnexa: Not palpable.    POP-Q:    Deferred.  No obvious POP present with valsalva.     NEUROLOGIC:  Cranial nerves 2 through 12 intact.  Strength 5/5.  DTRs 2+ lower extremities.  S2 through 4 normal.  Sacral reflexes intact.    EXT: SALVADOR, 2+ pulses bilaterally, no C/C/E    COUGH STRESS TEST:  negative  KEGEL: 1 /5    RECTAL:    External:  Normal, (--) hemorrhoids, (--) dovetailing.   Internal: deferred    PVR: 10 mL    Impression    1. Dysuria    2. Myalgia of pelvic floor    3. Urinary urgency    4. Urinary frequency    5. History of UTI    6. Anxiety and  depression        Initial Plan  The patient was counseled regarding these issues. The patient was given a summary sheet containing each of these issues with possible options for evaluation and management. When appropriate, we also reviewed computer-generated diagrams specific to their diagnoses..  All questions were addressed to the patient's satisfaction.    1)  Frequent UTIs (bladder infections):  --urine C&S sent today  --ureaplasma/myoplasma sent  --If you feel like you have a UTI, please call our office so that we can place an order for you to drop off a urine specimen at the closest [facility] lab (we will arrange).     --We will call in antibiotics for you to start right after you drop off specimen.     --In this way, we can determine:     1)  Do you have a UTI?      2) If you have a UTI, is it sensitive to the antibiotics we prescribed?   FOR FLARES:  Can take uribel up to 4x/day x 5 days.  If persists past that, call us.   --follow UTI prevention tips (see attached)  --control bowel movements/fecal cross-contamination  --empty bladder before and after intercourse  --start taking 1 probiotic pill (any with lactobacillus and/or acidophilus) daily  --consider need for further evaluation (pelvic imaging and cystoscopy) if issue persists    If Irritative voiding symptoms:  --Empty bladder every 3 hours.  Empty well: wait a minute, lean forward on toilet.    --Avoid dietary irritants (see sheet).  Keep diary x 3-5 days to determine your irritants.  --start pelvic floor PT:    [facility]:  1)  Adele Bassett ([facility]): (p) [phone]. (f) [phone]. Established patients call:  [phone]  2)  Lauren Shepley ([facility]).  (p) [phone].  Or [phone].   [facility]:  3)  Francisca Mireles ([facility] [facility] [employer]): (p) [phone].  (f) [phone].    --URGE: consider medication daily (anticholinergic).  Takes 2-4 weeks to see if will have effect.  For dry mouth: get sour,  sugar free lozenge or gum.    --control stress/anxiety, cognitive behavioral therapy:    [facility] (ART)  **Lorriejoanna Landa Waters  [address] [place], [place]    [phone]  [email or web address]    [facility]:  Trinity Abarca -    [phone]     [facility]:  Dr. Billingsley Head-Geraldine -    [phone]    [phone]     Bassam Milian [facility]  (p) [phone]  [address] [place]    [address]    Delmy Perez [facility]  (p) [phone]  [address]   [address]  [place]     [address]    Aaliyah Shelleyrefugio and her cell phone number is [phone].     [facility] [phone].      Dr. Balaji Hargrove [facility], [facility]  (p) [phone]  [address].   [address]  [place]     [address]  Website:  [email or web address]    [facility]  Mental Health Facility in [place], [place]  Address:  [address] [place] [address]  phone: [phone]    3)  RTC 3 months.     Approximately 60 min were spent in consult, 90 % in discussion.     Thank you for requesting consultation of your patient.  I look forward to participating in their care.    Romulo Neville  Female Pelvic Medicine and Reconstructive Surgery  [facility] [place], [place]

## 2018-06-16 LAB — BACTERIA UR CULT: NO GROWTH

## 2018-06-19 LAB
MYCOPLASMA GENITALIUM RESULT: NEGATIVE
SPECIMEN SOURCE: ABNORMAL
SPECIMEN SOURCE: NORMAL
U PARVUM DNA SPEC QL NAA+PROBE: POSITIVE
U UREALYTICUM DNA SPEC QL NAA+PROBE: POSITIVE

## 2018-06-20 ENCOUNTER — TELEPHONE (OUTPATIENT)
Dept: UROGYNECOLOGY | Facility: CLINIC | Age: 33
End: 2018-06-20

## 2018-06-20 DIAGNOSIS — Z22.39 CARRIER OF UREAPLASMA UREALYTICUM: Primary | ICD-10-CM

## 2018-06-20 RX ORDER — AZITHROMYCIN 250 MG/1
TABLET, FILM COATED ORAL
Qty: 6 TABLET | Refills: 0 | Status: SHIPPED | OUTPATIENT
Start: 2018-06-20 | End: 2018-06-26 | Stop reason: ALTCHOICE

## 2018-06-20 NOTE — TELEPHONE ENCOUNTER
Spoke with patient.  Let her know +ureaplasma.  ABX sent to pharmacy.  Advised to please still follow other instructions from last visit, including PT.  She voiced understanding.

## 2018-06-26 ENCOUNTER — OFFICE VISIT (OUTPATIENT)
Dept: INTERNAL MEDICINE | Facility: CLINIC | Age: 33
End: 2018-06-26
Attending: INTERNAL MEDICINE
Payer: COMMERCIAL

## 2018-06-26 VITALS
OXYGEN SATURATION: 98 % | HEIGHT: 59 IN | HEART RATE: 85 BPM | SYSTOLIC BLOOD PRESSURE: 110 MMHG | WEIGHT: 106.5 LBS | BODY MASS INDEX: 21.47 KG/M2 | DIASTOLIC BLOOD PRESSURE: 60 MMHG

## 2018-06-26 DIAGNOSIS — Z12.83 SKIN EXAM, SCREENING FOR CANCER: ICD-10-CM

## 2018-06-26 DIAGNOSIS — Z97.3 WEARS CONTACT LENSES: ICD-10-CM

## 2018-06-26 DIAGNOSIS — E53.8 B12 DEFICIENCY: ICD-10-CM

## 2018-06-26 DIAGNOSIS — Z00.00 ANNUAL PHYSICAL EXAM: Primary | ICD-10-CM

## 2018-06-26 DIAGNOSIS — E55.9 VITAMIN D DEFICIENCY: ICD-10-CM

## 2018-06-26 DIAGNOSIS — H50.9 STRABISMUS: ICD-10-CM

## 2018-06-26 DIAGNOSIS — M25.552 PAIN OF LEFT HIP JOINT: ICD-10-CM

## 2018-06-26 DIAGNOSIS — M85.89 OSTEOPENIA OF MULTIPLE SITES: ICD-10-CM

## 2018-06-26 PROCEDURE — 99385 PREV VISIT NEW AGE 18-39: CPT | Mod: S$GLB,,, | Performed by: INTERNAL MEDICINE

## 2018-06-26 PROCEDURE — 99999 PR PBB SHADOW E&M-EST. PATIENT-LVL V: CPT | Mod: PBBFAC,,, | Performed by: INTERNAL MEDICINE

## 2018-06-26 RX ORDER — MELOXICAM 15 MG/1
15 TABLET ORAL DAILY
Qty: 14 TABLET | Refills: 0 | Status: SHIPPED | OUTPATIENT
Start: 2018-06-26 | End: 2019-07-19

## 2018-06-26 NOTE — PROGRESS NOTES
Subjective:   Patient ID: Syl Elise is a 32 y.o. female  Chief complaint:   Chief Complaint   Patient presents with    [facility] Care       HPI  Pt here to [facility] care and for annual exam   Pt has hx or recurrent UTI's - was referred to urogyn  Testing included urine cx 6/15 which was ngtd  Testing + for ureaplasma - she completed azithro yesterday   rec pelvic floor PT which she is planning to start soon     Hx of ADD: Followed by Dr. Rene Rivera every 3 months   Has hx of adderall 20mg tid  Anxiety and depression: doing well on lexapro  Insomnia - > 10 years - tried different meds for ADD to see if would affect sleep less   - had sleep study at [facility] 2016 - no sleep apnea   Does not watch TV before bed - is on phone but working to reduce blue light exposure prior to bedtime   Tried benadryl, melatonin, ambien - not effective or caused odd SE  Took xanax x 2 doses and did not like way it made her feel  - thinks tried trazodone  Working to follow sleep hygiene     Hx of strabismus: had corrective surgery for this - not f/u in > 1 year     Has hx of intermittent left lateral dull achy nonradiating hip pain about 7-8 months ago - reports this was thought initially to be due to strain of gluteus minimus when went to PT x 6 months  Saw a sports med specialist and had xray hip and wnl per pt and MRI of pelvis - she never f/u on results of MRI but did  copy of disc. Had many issues with scheduling at [facility] and would prefer to f/u with new specialist at [facility].   Best improvement of sx was with dry needling   Is performing PT exercises at home    Hip started hurting again now 2 weeks ago - no trauma or inciting event  Mildly improves with ibuprofen  No numbness or weakness      Dx with osteopenia many years ago when had imaging for a reason now that she cannot recall.   Was anorexic in high school   Since dx she has been eating high calcium foods   Mom has hx of porosis   Reports hx of vit d def and B12  "def in past - took sl supplement for a while - not currently taking    Gyn: Dr. Hays    Review of Systems   Constitutional: Negative for activity change and unexpected weight change.   HENT: Negative for hearing loss, rhinorrhea and trouble swallowing.    Eyes: Negative for discharge and visual disturbance.   Respiratory: Negative for chest tightness and wheezing.    Cardiovascular: Negative for chest pain and palpitations.   Gastrointestinal: Negative for blood in stool, constipation, diarrhea and vomiting.   Endocrine: Positive for polyuria. Negative for polydipsia.   Genitourinary: Positive for dysuria. Negative for difficulty urinating, hematuria and menstrual problem.   Musculoskeletal: Positive for arthralgias. Negative for joint swelling and neck pain.   Neurological: Positive for weakness. Negative for headaches.   Psychiatric/Behavioral: Negative for confusion and dysphoric mood.       Objective:  Vitals:    06/26/18 1546   BP: 110/60   Pulse: 85   SpO2: 98%   Weight: 48.3 kg (106 lb 7.7 oz)   Height: 4' 11" (1.499 m)     Body mass index is 21.51 kg/m².    Physical Exam   Constitutional: She is oriented to person, place, and time. She appears well-developed and well-nourished.   HENT:   Head: Normocephalic and atraumatic.   Right Ear: External ear normal.   Left Ear: External ear normal.   Nose: Nose normal.   Mouth/Throat: Oropharynx is clear and moist. No oropharyngeal exudate.   No carotid bruits   Eyes: Conjunctivae and EOM are normal.   Neck: Neck supple. No thyromegaly present.   Cardiovascular: Normal rate, regular rhythm, normal heart sounds and intact distal pulses.    Pulmonary/Chest: Effort normal and breath sounds normal.   Abdominal: Soft. Bowel sounds are normal.   Musculoskeletal: She exhibits no edema or tenderness.   From of hips  No swelling or redness     Lymphadenopathy:     She has no cervical adenopathy.   Neurological: She is alert and oriented to person, place, and time.   Skin: " Skin is warm and dry.        Psychiatric: Her behavior is normal. Thought content normal.   Vitals reviewed.    Assessment:  1. Annual physical exam    2. Wears contact lenses    3. Strabismus    4. Osteopenia of multiple sites    5. Vitamin D deficiency    6. B12 deficiency    7. Pain of left hip joint    8. Skin exam, screening for cancer        Plan:  Syl was seen today for establish care.    Diagnoses and all orders for this visit:    Annual physical exam  -     DXA Bone Density Spine And Hip; Future  -     Vitamin D; Future  -     CBC auto differential; Future  -     Comprehensive metabolic panel; Future  -     TSH; Future  -     Lipid panel; Future  Recommend daily sunscreen, cardiovascular exercise min 30 min 5 days per week. Seatbelts routinely.    Wears contact lenses  -     Ambulatory Referral to Ophthalmology    Strabismus  -     Ambulatory Referral to Ophthalmology    Osteopenia of multiple sites  -     DXA Bone Density Spine And Hip; Future  rec otc supplement of calcium 1200mg and vit d 1000u divided into 2 doses daily along with reg exercise    Vitamin D deficiency  Check level - start otc supp    B12 deficiency  -     Vitamin B12; Future  Check level - resume supp if still low    Pain of left hip joint  -     ranitidine (ZANTAC) 75 MG tablet; Take 1 tablet (75 mg total) by mouth 2 (two) times daily.  -     meloxicam (MOBIC) 15 MG tablet; Take 1 tablet (15 mg total) by mouth once daily.  -     Ambulatory consult to Sports Medicine  Needs to f/u for results of MRI - she has a copy of imaging   Prefers to f/u with [facility] due to scheduling issues at [facility]  Will refer  Trial of mobic and zantac  Cont pt exercises    Skin exam, screening for cancer  -     Ambulatory Referral to Dermatology    Health Maintenance   Topic Date Due    Lipid Panel  1983    TETANUS VACCINE  11/13/2001    Pap Smear with HPV Cotest  11/13/2004    Influenza Vaccine  08/01/2018

## 2018-07-13 ENCOUNTER — HOSPITAL ENCOUNTER (OUTPATIENT)
Dept: RADIOLOGY | Facility: OTHER | Age: 33
Discharge: HOME | End: 2018-07-13
Attending: INTERNAL MEDICINE
Payer: COMMERCIAL

## 2018-07-13 DIAGNOSIS — M85.89 OSTEOPENIA OF MULTIPLE SITES: ICD-10-CM

## 2018-07-13 DIAGNOSIS — Z00.00 ANNUAL PHYSICAL EXAM: ICD-10-CM

## 2018-07-13 PROCEDURE — 77080 DXA BONE DENSITY AXIAL: CPT | Mod: 26,,, | Performed by: RADIOLOGY

## 2018-07-13 PROCEDURE — 77080 DXA BONE DENSITY AXIAL: CPT | Mod: TC

## 2018-07-17 ENCOUNTER — TELEPHONE (OUTPATIENT)
Dept: INTERNAL MEDICINE | Facility: CLINIC | Age: 33
End: 2018-07-17

## 2018-07-19 RX ORDER — ACETAMINOPHEN 500 MG
1 TABLET ORAL DAILY
COMMUNITY
Start: 2018-07-19

## 2018-08-03 ENCOUNTER — TELEPHONE (OUTPATIENT)
Dept: UROGYNECOLOGY | Facility: CLINIC | Age: 33
End: 2018-08-03

## 2018-08-03 NOTE — TELEPHONE ENCOUNTER
Attempted to contact pt regarding rescheduling her appointment on 9/20 at the [facility] location. Lm on voicemail.      Appointment rescheduled to 9/14 at 8:30a at the [facility] location. Appointment slip mailed

## 2018-08-31 ENCOUNTER — TELEPHONE (OUTPATIENT)
Dept: UROGYNECOLOGY | Facility: CLINIC | Age: 33
End: 2018-08-31

## 2018-08-31 ENCOUNTER — LAB VISIT (OUTPATIENT)
Dept: LAB | Facility: HOSPITAL | Age: 33
End: 2018-08-31
Attending: OBSTETRICS & GYNECOLOGY
Payer: COMMERCIAL

## 2018-08-31 DIAGNOSIS — R30.9 PAINFUL URINATION: ICD-10-CM

## 2018-08-31 DIAGNOSIS — R39.15 URINARY URGENCY: ICD-10-CM

## 2018-08-31 DIAGNOSIS — R39.15 URINARY URGENCY: Primary | ICD-10-CM

## 2018-08-31 LAB
BILIRUB UR QL STRIP: NEGATIVE
CLARITY UR REFRACT.AUTO: ABNORMAL
COLOR UR AUTO: ABNORMAL
GLUCOSE UR QL STRIP: NEGATIVE
HGB UR QL STRIP: NEGATIVE
KETONES UR QL STRIP: NEGATIVE
LEUKOCYTE ESTERASE UR QL STRIP: NEGATIVE
NITRITE UR QL STRIP: NEGATIVE
PH UR STRIP: 5 [PH] (ref 5–8)
PROT UR QL STRIP: NEGATIVE
SP GR UR STRIP: 1.01 (ref 1–1.03)
URN SPEC COLLECT METH UR: ABNORMAL
UROBILINOGEN UR STRIP-ACNC: NEGATIVE EU/DL

## 2018-08-31 PROCEDURE — 81003 URINALYSIS AUTO W/O SCOPE: CPT

## 2018-08-31 RX ORDER — NITROFURANTOIN (MACROCRYSTALS) 100 MG/1
100 CAPSULE ORAL 2 TIMES DAILY
Qty: 14 CAPSULE | Refills: 0 | Status: SHIPPED | OUTPATIENT
Start: 2018-08-31 | End: 2018-09-07

## 2018-08-31 NOTE — TELEPHONE ENCOUNTER
----- Message from Jane Rangel sent at 8/31/2018  1:07 PM CDT -----  Contact: pt  Name of Who is Calling: FENG STEIN [[id number]]    What is the request in detail: Patient is requesting a call back in regards to UTI symptoms,advised patient of upcoming appointment.....Please contact to further discuss and advise      Can the clinic reply by [facility]: No     What Number to Call Back if not in [facility]: [phone].

## 2018-08-31 NOTE — TELEPHONE ENCOUNTER
Spoke with pt who states that she is having some UTI symptoms, Pt is having urinary urgency with burning on urination. Pt denies pain, fever or nausea and vomiting. Pt is currently not taking any medication to help with symptoms and is scheduled to go out of town today. [place] but is requesting something be prescribed prior to culture coming back. Pt will contact office once she make it to [place] to give local pharmacy or medication can be prescribed to [facility] pharmacy here so that it can be transferred. Explain to pt that doctor is not in the office today but I will forward information and both doctor and NP and someone from the office will be in contact with her, pt voiced understanding and call was ended.

## 2018-08-31 NOTE — TELEPHONE ENCOUNTER
Spoke with patient.  Advised her to take pyridium 3x/day x next few days.  Sent in macrobid.  She can take this if still + symptoms after finishing macrobid.  Advised would like her to try to wait to take ABX, though, until C&S back unless symptoms really bad, as all previous C&S neg.  She has been stressed x 1 week.  Advised her to work on stress control, avoid dietary irritants, and control BMs, all of which can effect bladder irritability, not related to UTIs. She voiced understanding. She knows C&S won't be back until mid next week.   ------------------------------    In the future, if patient calls with UTI and would like treatment, if I'm not immediately available, can you please ask Hannah or Dr. BUITRAGO to help fill med or help Tx patient?  That way the message actually gets handled in a timely fashion.  Thank you :)

## 2018-09-03 ENCOUNTER — TELEPHONE (OUTPATIENT)
Dept: UROGYNECOLOGY | Facility: CLINIC | Age: 33
End: 2018-09-03

## 2018-09-03 DIAGNOSIS — N39.0 URINARY TRACT INFECTION WITHOUT HEMATURIA, SITE UNSPECIFIED: Primary | ICD-10-CM

## 2018-09-03 NOTE — TELEPHONE ENCOUNTER
Patient called recently with UTI symptoms.  Looks like UA is back, but C&S is there--not pending.  Did the lab not send it?  Please call the lab and find out what happened.  If it didn't get done, please let me know (we're sorry!), but she should try to drop off another urine for C&S when she's back in town.  Her basic UA looks negative, but the C&S is the only definitive way to check for infection.

## 2018-09-04 NOTE — TELEPHONE ENCOUNTER
Spoke to pt, she was informed that the lab only processed the UA and not the culture, pt verbalizes understanding. Pt states she can drop off another specimen on tomorrow 9/5 at the [place] location.

## 2018-09-14 ENCOUNTER — OFFICE VISIT (OUTPATIENT)
Dept: UROGYNECOLOGY | Facility: CLINIC | Age: 33
End: 2018-09-14
Payer: COMMERCIAL

## 2018-09-14 VITALS
HEIGHT: 60 IN | WEIGHT: 107.13 LBS | SYSTOLIC BLOOD PRESSURE: 80 MMHG | DIASTOLIC BLOOD PRESSURE: 62 MMHG | BODY MASS INDEX: 21.03 KG/M2

## 2018-09-14 DIAGNOSIS — F32.A ANXIETY AND DEPRESSION: ICD-10-CM

## 2018-09-14 DIAGNOSIS — R35.0 URINARY FREQUENCY: ICD-10-CM

## 2018-09-14 DIAGNOSIS — Z87.440 HISTORY OF UTI: ICD-10-CM

## 2018-09-14 DIAGNOSIS — R39.15 URINARY URGENCY: Primary | ICD-10-CM

## 2018-09-14 DIAGNOSIS — M79.18 MYALGIA OF PELVIC FLOOR: ICD-10-CM

## 2018-09-14 DIAGNOSIS — F41.9 ANXIETY AND DEPRESSION: ICD-10-CM

## 2018-09-14 PROCEDURE — 3008F BODY MASS INDEX DOCD: CPT | Mod: CPTII,S$GLB,, | Performed by: OBSTETRICS & GYNECOLOGY

## 2018-09-14 PROCEDURE — 99999 PR PBB SHADOW E&M-EST. PATIENT-LVL III: CPT | Mod: PBBFAC,,, | Performed by: OBSTETRICS & GYNECOLOGY

## 2018-09-14 PROCEDURE — 99213 OFFICE O/P EST LOW 20 MIN: CPT | Mod: S$GLB,,, | Performed by: OBSTETRICS & GYNECOLOGY

## 2018-09-14 RX ORDER — AMITRIPTYLINE HYDROCHLORIDE 10 MG/1
TABLET, FILM COATED ORAL
Qty: 60 TABLET | Refills: 11 | Status: SHIPPED | OUTPATIENT
Start: 2018-09-14 | End: 2018-12-21 | Stop reason: SDUPTHER

## 2018-09-14 NOTE — PROGRESS NOTES
[facility]  Urogyn follow up  09/15/2018    [facility]  [address] [id number]    Syl Elise  [id number]  1985      Syl Elise is a 32 y.o.  here for a urogyn follow up.    1)  Reported frequent UTIs:  Per GYN note 5/14/18:  complaints of recurrent UTI's since November. She has had a UTI in Late [place] 2017 ([place] and treated with Macrobid--not clear if C&S sent, then had another one in mid April for which she was seen at an urgent care clinic--doesn't sound like C&S sent, and treated with Bactrim. After about 3 days, she stopped the Bactrim due to muscle aches and feeling weird. She was then switched to Keflex for the completion of the treatment.  Was then seen by UC for persistent UTI symptoms, but she was told she didn't have UTI per urine dip.  Was Rx'd pyridium--helped symptoms, except for fullness.  She was given a prescription for macrobid by her friend's mother.   5/14/18 urine C&S NEG. 5/14/18 chlam/caleb NEG.   --typical symptoms:  Burning at end of urination x 1 day, then increased U/F/bladder aching and fullness, urine malodor; no blood in urine  --no previous h/o UTIs   --no symptoms today  --inciting factors: intercourse; 2nd UTI was day before menses; happened after not sleeping well x 2 nights (has some chronic insomnia)  --has been trying to increase hydration, emptying bladder before/after intercourse  --anxiety/depression:  Has some baseline stress.  Feels that lexapro is helping with depression.  Thinks she needs to see a therapist.    --no baseline constipation/diarrhea; can have some stomach upset, laurel after ABX  --no UI; did have a habit of holding bladder for long periods--trying to void more frequently    Past Medical History  Past Medical History:   Diagnosis Date    Abnormal Pap smear of cervix 2004    LEEP done    ADHD (attention deficit hyperactivity disorder)     Anxiety     Depression     Hip  pain     left Hip xray 2018 - minimal bilateral hip degeneration - mild osteophytosis within acetabula      CROW (obstructive sleep apnea) - mild 2016    sleep study from [facility] re-eval in clinic with further mgmt including ENT eval, conservative mgmt, or PAP therapy    Osteopenia     2016 on bone density - left femur T score -1.9    Strabismus     Vitamin D deficiency       Past Surgical History  Past Surgical History:   Procedure Laterality Date    CERVICAL BIOPSY  W/ LOOP ELECTRODE EXCISION      EYE SURGERY  ,     strabismus       Hysterectomy: No    Past Ob History      Gynecologic History  LMP: No LMP recorded.  Age of menarche: 12 yo  Age of menopause: NA  Menstrual history: monthly; has some dysmenorrhea x 1st 2 days, ibuprofen relieves; does have some MS  Pap test: 3/2018 ([facility]), normal per report.  History of abnormal paps: Yes - s/p ALICIA ().  History of STIs:  No  Mammogram: none  Colonoscopy: Date of last:  (for stomach upset + EGD).  Result:  Normal per report.  Repeat due:  42 yo.    DEXA: none    Issues include:  Patient Active Problem List   Diagnosis    Dysuria    Myalgia of pelvic floor    Urinary urgency    Urinary frequency    History of UTI    Anxiety and depression    Wears contact lenses    Osteopenia of multiple sites     History since last visit:     1)  Frequent UTIs (bladder infections):  --urine C&S ,  NEG; UA  neg  --ureaplasma/myoplasma weren't sent correctly; took azith empirically  --feels like things are improving  --not avoiding dietary irritants  --notes increase in flares with stress; working on meditation; has not contacted therapist  --attending pelvic floor PT ([facility]), which helps  FOR FLARES:  Has tried uribel--does help.     Medications:    Current Outpatient Medications:     cholecalciferol, vitamin D3, (VITAMIN D3) 2,000 unit Cap, Take 1 capsule (2,000 Units total) by mouth once daily.,  Disp: , Rfl:     dextroamphetamine-amphetamine (ADDERALL) 20 mg tablet, 20 mg 3 (three) times daily. , Disp: , Rfl:     escitalopram oxalate (LEXAPRO) 20 MG tablet, Take 20 mg by mouth once daily., Disp: , Rfl: 0    meloxicam (MOBIC) 15 MG tablet, Take 1 tablet (15 mg total) by mouth once daily., Disp: 14 tablet, Rfl: 0    methen-jaimeblue-s.phos-phsal-hyo 118-10-40.8-36 mg Cap, Take 1 capsule by mouth 4 (four) times daily as needed., Disp: 20 capsule, Rfl: 6    ranitidine (ZANTAC) 75 MG tablet, Take 1 tablet (75 mg total) by mouth 2 (two) times daily., Disp: 60 tablet, Rfl: 0    amitriptyline (ELAVIL) 10 MG tablet, 10 mg PO nightly x 7 days.  Add 10 mg every week until symptoms controlled. Max dose 50 mg PO nightly., Disp: 60 tablet, Rfl: 11    ROS:  As per HPI.      Exam  BP (!) 80/62 (BP Location: Left arm, Patient Position: Sitting)   Ht 5' (1.524 m)   Wt 48.6 kg (107 lb 2.3 oz)   BMI 20.93 kg/m²   General: alert and oriented, no acute distress  Remainder of PE deferred    Impression  1. Urinary urgency  amitriptyline (ELAVIL) 10 MG tablet   2. Urinary frequency  amitriptyline (ELAVIL) 10 MG tablet   3. Anxiety and depression     4. History of UTI     5. Myalgia of pelvic floor       We reviewed the above issues and discussed options for short-term versus long-term management of her problems.   Plan:   1)  Frequent UTIs (bladder infections):  --urine C&S sent today  --s/p empirical treatment for ureaplasma/myoplasma  --If you feel like you have a UTI, please call our office so that we can place an order for you to drop off a urine specimen at the closest [facility] lab (we will arrange).     --We will call in antibiotics for you to start right after you drop off specimen.     --In this way, we can determine:     1)  Do you have a UTI?      2) If you have a UTI, is it sensitive to the antibiotics we prescribed?   --start amitriptyline: 10 mg PO nightly x 7 days.  Add 10 mg every week until symptoms controlled.  Max dose 50 mg PO nightly.  FOR FLARES:  Can take uribel up to 4x/day x 5 days.  If persists past that, call us.   --follow UTI prevention tips (see attached)  --control bowel movements/fecal cross-contamination  --empty bladder before and after intercourse  --start taking 1 probiotic pill (any with lactobacillus and/or acidophilus) daily  --consider need for further evaluation (pelvic imaging and cystoscopy) if issue persists    If Irritative voiding symptoms:  --Empty bladder every 3 hours.  Empty well: wait a minute, lean forward on toilet.    --Avoid dietary irritants (see sheet).  Keep diary x 3-5 days to determine your irritants.  --can try prelief with meals, neutralizes acid, if having bad day or consuming a trigger  --continue pelvic floor PT with [facility]  --URGE: start amitriptyline: 10 mg PO nightly x 7 days.  Add 10 mg every week until symptoms controlled. Max dose 50 mg PO nightly. consider medication daily (anticholinergic).    --control stress/anxiety, cognitive behavioral therapy; let psych know that stress level has increased    [facility] (ART)  **Lorrie Waters  [address] [place], [place]    [id number] [phone]  [email or web address]    [facility] Services Authority:  Trinity Abarca -    [phone]    [phone]     [facility] Services District:  Dr. Billingsley Head-Geraldine -    [phone]     Bassam Milian [facility], FT  (p) [phone]  [address] [place]    [address]    Del[facility] Perez [facility]  (p) [phone]  [address]   [address]  [place]     [address]    Aaliyah Lorenzo and her cell phone number is [phone].     [facility] [phone].      Dr. Balaji Hargrove [facility], LMFT  (p) [phone]  [address]   [address]  [place]     [address]  Website:  [email or web address]    [facility] in [place], [place]  Address:  [address] [place] [address]  phone: [phone]    3)  RTC 3 months.     30 minutes were spent in face to face time with this patient  100 % of this time was spent in counseling and/or coordination of care     Romulo Neville MD  [facility]  Division of Female Pelvic Medicine and Reconstructive Surgery  Department of Obstetrics & Gynecology

## 2018-09-14 NOTE — PATIENT INSTRUCTIONS
1)  Frequent UTIs (bladder infections):  --urine C&S sent today  --s/p empirical treatment for ureaplasma/myoplasma  --If you feel like you have a UTI, please call our office so that we can place an order for you to drop off a urine specimen at the closest [facility] lab (we will arrange).     --We will call in antibiotics for you to start right after you drop off specimen.     --In this way, we can determine:     1)  Do you have a UTI?      2) If you have a UTI, is it sensitive to the antibiotics we prescribed?   --start amitriptyline: 10 mg PO nightly x 7 days.  Add 10 mg every week until symptoms controlled. Max dose 50 mg PO nightly.  FOR FLARES:  Can take uribel up to 4x/day x 5 days.  If persists past that, call us.   --follow UTI prevention tips (see attached)  --control bowel movements/fecal cross-contamination  --empty bladder before and after intercourse  --start taking 1 probiotic pill (any with lactobacillus and/or acidophilus) daily  --consider need for further evaluation (pelvic imaging and cystoscopy) if issue persists    If Irritative voiding symptoms:  --Empty bladder every 3 hours.  Empty well: wait a minute, lean forward on toilet.    --Avoid dietary irritants (see sheet).  Keep diary x 3-5 days to determine your irritants.  --can try prelief with meals, neutralizes acid, if having bad day or consuming a trigger  --continue pelvic floor PT with Franc[facility]a  --URGE: start amitriptyline: 10 mg PO nightly x 7 days.  Add 10 mg every week until symptoms controlled. Max dose 50 mg PO nightly. consider medication daily (anticholinergic).    --control stress/anxiety, cognitive behavioral therapy; let psych know that stress level has increased    [facility] ([facility])  **Lorrie Waters  [address] [place] [address]    [address]  [phone]  [email or web address]    [facility] Human Services Authority:  Trinity Abarca -     [phone]    [phone]     [facility] Services District:  Dr. Billingsley Head[facility] -    [phone]    [phone]     Bassam Milian [facility], FT  (p) [phone]  [address] [place]    [address]    Delmy Perez \[facility]\""W  (p) [phone]  [address]   [address]  [place]     [address]    Aaliyah Lorenzo and her cell phone number is [phone].     [facility] [phone].      Dr. Balaji Hargrove [facility], LMFT  (t) [phone]  [address]   [address]  [place]     [address]  Website:  [email or web address]    [facility]  Mental Health Facility in [place], [place]  Address:  [address] [place] [address]  phone: [phone]    3)  RTC 3 months.

## 2018-11-26 ENCOUNTER — TELEPHONE (OUTPATIENT)
Dept: UROGYNECOLOGY | Facility: CLINIC | Age: 33
End: 2018-11-26

## 2018-11-26 ENCOUNTER — PATIENT MESSAGE (OUTPATIENT)
Dept: UROGYNECOLOGY | Facility: CLINIC | Age: 33
End: 2018-11-26

## 2018-11-26 DIAGNOSIS — N30.00 ACUTE CYSTITIS WITHOUT HEMATURIA: Primary | ICD-10-CM

## 2018-11-26 NOTE — TELEPHONE ENCOUNTER
Spoke with pt. Who will drop off urine , and will try otc AZO as well. Order is in the system as well.

## 2018-11-26 NOTE — TELEPHONE ENCOUNTER
Dr Neville,     I think I have a UTI (or something, for that matter) and was wondering if I should go to an [facility] location to submit a urine sample for analysis? Kwasi been taking the supplement  d-mannose and the analgesic you prescribed for over a week, and its still not better. Please let me know how I should proceed. Thank you for your time and care!         Pt. Called with possible UTI again , X 10 days, she gets better and then there again, wants to bring a urine specimen to the nearest [facility],    No fever, no spasms, no bleeding, no pressue, urine is yellowish, and has not tried AZO otc.    ARTURO ADVISE

## 2018-12-21 ENCOUNTER — OFFICE VISIT (OUTPATIENT)
Dept: UROGYNECOLOGY | Facility: CLINIC | Age: 33
End: 2018-12-21
Payer: COMMERCIAL

## 2018-12-21 VITALS
BODY MASS INDEX: 20.38 KG/M2 | HEIGHT: 60 IN | SYSTOLIC BLOOD PRESSURE: 100 MMHG | WEIGHT: 103.81 LBS | DIASTOLIC BLOOD PRESSURE: 60 MMHG

## 2018-12-21 DIAGNOSIS — R39.15 URINARY URGENCY: ICD-10-CM

## 2018-12-21 DIAGNOSIS — F41.9 ANXIETY AND DEPRESSION: ICD-10-CM

## 2018-12-21 DIAGNOSIS — F32.A ANXIETY AND DEPRESSION: ICD-10-CM

## 2018-12-21 DIAGNOSIS — R35.0 URINARY FREQUENCY: ICD-10-CM

## 2018-12-21 DIAGNOSIS — Z87.440 HISTORY OF UTI: ICD-10-CM

## 2018-12-21 DIAGNOSIS — M79.18 MYALGIA OF PELVIC FLOOR: ICD-10-CM

## 2018-12-21 DIAGNOSIS — R30.0 DYSURIA: Primary | ICD-10-CM

## 2018-12-21 PROCEDURE — 87086 URINE CULTURE/COLONY COUNT: CPT

## 2018-12-21 PROCEDURE — 99213 OFFICE O/P EST LOW 20 MIN: CPT | Mod: S$GLB,,, | Performed by: OBSTETRICS & GYNECOLOGY

## 2018-12-21 PROCEDURE — 87591 N.GONORRHOEAE DNA AMP PROB: CPT

## 2018-12-21 PROCEDURE — 87491 CHLMYD TRACH DNA AMP PROBE: CPT

## 2018-12-21 PROCEDURE — 99999 PR PBB SHADOW E&M-EST. PATIENT-LVL III: CPT | Mod: PBBFAC,,, | Performed by: OBSTETRICS & GYNECOLOGY

## 2018-12-21 PROCEDURE — 3008F BODY MASS INDEX DOCD: CPT | Mod: CPTII,S$GLB,, | Performed by: OBSTETRICS & GYNECOLOGY

## 2018-12-21 RX ORDER — ZALEPLON 10 MG/1
CAPSULE ORAL
COMMUNITY
Start: 2017-03-23 | End: 2018-12-21

## 2018-12-21 RX ORDER — AMITRIPTYLINE HYDROCHLORIDE 10 MG/1
TABLET, FILM COATED ORAL
Qty: 60 TABLET | Refills: 11 | Status: SHIPPED | OUTPATIENT
Start: 2018-12-21 | End: 2020-02-10

## 2018-12-21 RX ORDER — MINERAL OIL
180 ENEMA (ML) RECTAL
COMMUNITY
End: 2020-02-10

## 2018-12-21 NOTE — PROGRESS NOTES
[facility]  Urogyn follow up  12/28/2018    [facility]  [address] [address]    Syl Elise  [id number]  1985      Syl Elise is a 33 y.o.  here for a urogyn follow up.    1)  Reported frequent UTIs:  Per GYN note 5/14/18:  complaints of recurrent UTI's since November. She has had a UTI in Late November 2017 ([facility] [place]) and treated with Macrobid--not clear if C&S sent, then had another one in mid April for which she was seen at an urgent care clinic--doesn't sound like C&S sent, and treated with Bactrim. After about 3 days, she stopped the Bactrim due to muscle aches and feeling weird. She was then switched to Keflex for the completion of the treatment.  Was then seen by UC for persistent UTI symptoms, but she was told she didn't have UTI per urine dip.  Was Rx'd pyridium--helped symptoms, except for fullness.  She was given a prescription for macrobid by her friend's mother.   5/14/18 urine C&S NEG. 5/14/18 chlam/caleb NEG.   --typical symptoms:  Burning at end of urination x 1 day, then increased U/F/bladder aching and fullness, urine malodor; no blood in urine  --no previous h/o UTIs   --no symptoms today  --inciting factors: intercourse; 2nd UTI was day before menses; happened after not sleeping well x 2 nights (has some chronic insomnia)  --has been trying to increase hydration, emptying bladder before/after intercourse  --anxiety/depression:  Has some baseline stress.  Feels that lexapro is helping with depression.  Thinks she needs to see a therapist.    --no baseline constipation/diarrhea; can have some stomach upset, laurel after ABX  --no UI; did have a habit of holding bladder for long periods--trying to void more frequently    Past Medical History  Past Medical History:   Diagnosis Date    Abnormal Pap smear of cervix 2004    LEEP done    ADHD (attention deficit hyperactivity disorder)     Anxiety     Depression     Hip  pain     left Hip xray 2018 - minimal bilateral hip degeneration - mild osteophytosis within acetabula      CROW (obstructive sleep apnea) - mild 2016    sleep study from [facility] re-eval in clinic with further mgmt including ENT eval, conservative mgmt, or PAP therapy    Osteopenia     2016 on bone density - left femur T score -1.9    Strabismus     Vitamin D deficiency       Past Surgical History  Past Surgical History:   Procedure Laterality Date    CERVICAL BIOPSY  W/ LOOP ELECTRODE EXCISION      EYE SURGERY  ,     strabismus       Hysterectomy: No    Past Ob History      Gynecologic History  LMP: No LMP recorded.  Age of menarche: 12 yo  Age of menopause: NA  Menstrual history: monthly; has some dysmenorrhea x 1st 2 days, ibuprofen relieves; does have some MS  Pap test: 3/2018 ([facility] normal per report.  History of abnormal paps: Yes - s/p ALICIA ().  History of STIs:  No  Mammogram: none  Colonoscopy: Date of last:  (for stomach upset + EGD).  Result:  Normal per report.  Repeat due:  44 yo.    DEXA: none    Issues include:  Patient Active Problem List   Diagnosis    Dysuria    Myalgia of pelvic floor    Urinary urgency    Urinary frequency    History of UTI    Anxiety and depression    Wears contact lenses    Osteopenia of multiple sites     History since last visit:     1)  Frequent UTIs (bladder infections):  --urine C&S ,  NEG; UA  neg  --thought she had UTI in --saw [facility] clinic but weren't able to send C&S; so, continued to take uribel and hydrate well, which resolved things after 3 weeks.   --ureaplasma/myoplasma weren't sent correctly; took azith empirically  --feels like things are improving  --not avoiding dietary irritants  --notes increase in flares with stress; working on meditation; has not contacted therapist  --finished pelvic floor PT (Francisca), which helped  --did not try elavil yet  --has new sex partner --  some mild irritation post-coitally  --did not try prelief  FOR FLARES:  Has tried uribel--does help.     Medications:    Current Outpatient Medications:     dextroamphetamine-amphetamine (ADDERALL) 20 mg tablet, 20 mg 3 (three) times daily. , Disp: , Rfl:     escitalopram oxalate (LEXAPRO) 20 MG tablet, Take 20 mg by mouth once daily., Disp: , Rfl: 0    fexofenadine (ALLEGRA) 180 MG tablet, Take 180 mg by mouth., Disp: , Rfl:     FLUARIX QUAD 8255-5378, PF, 60 mcg (15 mcg x 4)/0.5 mL Syrg vaccine, ADM 0.5ML IM UTD, Disp: , Rfl: 0    methen-m.blue-s.phos-phsal-hyo 118-10-40.8-36 mg Cap, Take 1 capsule by mouth 4 (four) times daily as needed., Disp: 20 capsule, Rfl: 6    amitriptyline (ELAVIL) 10 MG tablet, 10 mg PO nightly x 7 days.  Add 10 mg every week until symptoms controlled. Max dose 50 mg PO nightly., Disp: 60 tablet, Rfl: 11    cholecalciferol, vitamin D3, (VITAMIN D3) 2,000 unit Cap, Take 1 capsule (2,000 Units total) by mouth once daily., Disp: , Rfl:     meloxicam (MOBIC) 15 MG tablet, Take 1 tablet (15 mg total) by mouth once daily., Disp: 14 tablet, Rfl: 0    ranitidine (ZANTAC) 75 MG tablet, Take 1 tablet (75 mg total) by mouth 2 (two) times daily., Disp: 60 tablet, Rfl: 0    ROS:  As per HPI.      Exam  /60   Ht 5' (1.524 m)   Wt 47.1 kg (103 lb 13.4 oz)   LMP 12/13/2018   BMI 20.28 kg/m²   General: alert and oriented, no acute distress  Remainder of PE deferred    Impression  1. Dysuria  Urine culture    C. trachomatis/N. gonorrhoeae by AMP DNA [facility]; Urine   2. Urinary urgency  amitriptyline (ELAVIL) 10 MG tablet   3. Urinary frequency  amitriptyline (ELAVIL) 10 MG tablet   4. Anxiety and depression     5. History of UTI     6. Myalgia of pelvic floor       We reviewed the above issues and discussed options for short-term versus long-term management of her problems.   Plan:   1)  Frequent UTIs (bladder infections):  --urine C&S sent today  --s/p empirical treatment for  ureaplasma/myoplasma  --If you feel like you have a UTI, please call our office so that we can place an order for you to drop off a urine specimen at the closest [facility] lab (we will arrange).     --We will call in antibiotics for you to start right after you drop off specimen.     --In this way, we can determine:     1)  Do you have a UTI?      2) If you have a UTI, is it sensitive to the antibiotics we prescribed?   --start amitriptyline: 10 mg PO nightly x 7 days.  Add 10 mg every week until symptoms controlled. Max dose 50 mg PO nightly.  FOR FLARES:  Can take uribel up to 4x/day x 5 days.  If persists past that, call us.   --continue PT exercises at home  --follow UTI prevention tips (see attached)  --control bowel movements/fecal cross-contamination  --empty bladder before and after intercourse  --start taking 1 probiotic pill (any with lactobacillus and/or acidophilus) daily  --consider need for further evaluation (pelvic imaging and cystoscopy) if issue persists    If Irritative voiding symptoms:  --Empty bladder every 3 hours.  Empty well: wait a minute, lean forward on toilet.    --Avoid dietary irritants (see sheet).  Keep diary x 3-5 days to determine your irritants.  --can try prelief with meals, neutralizes acid, if having bad day or consuming a trigger  --continue pelvic floor PT with Francisca  --URGE: start amitriptyline: 10 mg PO nightly x 7 days.  Add 10 mg every week until symptoms controlled. Max dose 50 mg PO nightly. consider medication daily (anticholinergic).    --control stress/anxiety, cognitive behavioral therapy; let psych know that stress level has increased    Accelerated Resolution Therapy (ART)  **Lorrie Waters  [address] [place] [address]    [address]  [phone]  [email or web address]    [facility] Authority:  Trinity Abarca -    [phone]     [facility]  District:  Dr. Billingsley [facility] -    [phone]    [phone]     Bassambruce Milian [facility], [facility]  (j) [phone]  [address] [place]    [address]    Delmymarisol Perez [facility]  (p) [phone]  [address]   [address]  [place]     [address]    Aaliyah Lorenzo and her cell phone number is [phone].     [facility] [phone].      Dr. Balaji Hargrove [facility], FT  (n) [phone]  [address]   [address]  [place]     [address]  Website:  [employer]    [facility]  Mental Health Facility in [place], [place]  Address:  [address] [place] [address]  phone: [phone]    3) STI screening:  --wants to wait for blood tests.  Let us know if want to proceed: HIV, syphilis, hepatitis  --urine chlamydia/gonorrhea sent today    4)  RTC 6 months.     30 minutes were spent in face to face time with this patient  100 % of this time was spent in counseling and/or coordination of care     Romulo Neville MD  [facility]  Division of Female Pelvic Medicine and Reconstructive Surgery  Department of Obstetrics & Gynecology

## 2018-12-21 NOTE — PATIENT INSTRUCTIONS
1)  Frequent UTIs (bladder infections):  --urine C&S sent today  --s/p empirical treatment for ureaplasma/myoplasma  --If you feel like you have a UTI, please call our office so that we can place an order for you to drop off a urine specimen at the closest [facility] lab (we will arrange).     --We will call in antibiotics for you to start right after you drop off specimen.     --In this way, we can determine:     1)  Do you have a UTI?      2) If you have a UTI, is it sensitive to the antibiotics we prescribed?   --start amitriptyline: 10 mg PO nightly x 7 days.  Add 10 mg every week until symptoms controlled. Max dose 50 mg PO nightly.  FOR FLARES:  Can take uribel up to 4x/day x 5 days.  If persists past that, call us.   --continue PT exercises at home  --follow UTI prevention tips (see attached)  --control bowel movements/fecal cross-contamination  --empty bladder before and after intercourse  --start taking 1 probiotic pill (any with lactobacillus and/or acidophilus) daily  --consider need for further evaluation (pelvic imaging and cystoscopy) if issue persists    If Irritative voiding symptoms:  --Empty bladder every 3 hours.  Empty well: wait a minute, lean forward on toilet.    --Avoid dietary irritants (see sheet).  Keep diary x 3-5 days to determine your irritants.  --can try prelief with meals, neutralizes acid, if having bad day or consuming a trigger  --continue pelvic floor PT with Francisca  --URGE: start amitriptyline: 10 mg PO nightly x 7 days.  Add 10 mg every week until symptoms controlled. Max dose 50 mg PO nightly. consider medication daily (anticholinergic).    --control stress/anxiety, cognitive behavioral therapy; let psych know that stress level has increased    Accelerated Resolution Therapy (ART)  **Lorrie Waters  [address] [place] [address]    [address]  [phone]  [email or web address]    [facility] Authority:  Trinity Gilmore  Tevin -    [phone]    [phone]     [facility] District:  Dr. Billingsley Head-Geraldine -    [phone]    [phone]     Bassam Milian [facility], [facility]  (p) [phone]  [address] [place]    [address]    Delmy Perez \[facility]\""W  (p) [phone]  [address]   [address]  [place]     [address]    Aaliyah Lorenzo and her cell phone number is [phone].     [facility] [phone].      Dr. Balaji Hargrove [facility], FT  (p) [phone]  [address]   [address]  [place]     [address]  Website:  [email or web address]    [facility]  Mental Health Facility in [place], [place]  Address:  [address] [place] [address]  phone: [phone]    3) STI screening:  --wants to wait for blood tests.  Let us know if want to proceed: HIV, syphilis, hepatitis  --urine chlamydia/gonorrhea sent today    4)  RTC 6 months.

## 2018-12-22 LAB — BACTERIA UR CULT: NORMAL

## 2018-12-24 ENCOUNTER — TELEPHONE (OUTPATIENT)
Dept: UROGYNECOLOGY | Facility: CLINIC | Age: 33
End: 2018-12-24

## 2018-12-24 LAB
C TRACH DNA SPEC QL NAA+PROBE: NOT DETECTED
N GONORRHOEA DNA SPEC QL NAA+PROBE: NOT DETECTED

## 2018-12-24 NOTE — TELEPHONE ENCOUNTER
Attempted to contact pt to relay:Please let the patient know urine C&S was negative for infection, no answer unable to leave message do to mailbox being full

## 2018-12-24 NOTE — TELEPHONE ENCOUNTER
----- Message from Romulo Neville MD sent at 12/23/2018  7:57 AM CST -----  Please let the patient know urine C&S was negative for infection.  Thanks!

## 2019-02-15 ENCOUNTER — OFFICE VISIT (OUTPATIENT)
Dept: OPTOMETRY | Facility: CLINIC | Age: 34
End: 2019-02-15
Payer: COMMERCIAL

## 2019-02-15 DIAGNOSIS — H52.203 MYOPIA OF BOTH EYES WITH ASTIGMATISM: Primary | ICD-10-CM

## 2019-02-15 DIAGNOSIS — H52.13 MYOPIA OF BOTH EYES WITH ASTIGMATISM: Primary | ICD-10-CM

## 2019-02-15 PROCEDURE — 92015 DETERMINE REFRACTIVE STATE: CPT | Mod: S$GLB,,, | Performed by: OPTOMETRIST

## 2019-02-15 PROCEDURE — 99999 PR PBB SHADOW E&M-EST. PATIENT-LVL II: CPT | Mod: PBBFAC,,, | Performed by: OPTOMETRIST

## 2019-02-15 PROCEDURE — 92015 PR REFRACTION: ICD-10-PCS | Mod: S$GLB,,, | Performed by: OPTOMETRIST

## 2019-02-15 PROCEDURE — 92004 COMPRE OPH EXAM NEW PT 1/>: CPT | Mod: S$GLB,,, | Performed by: OPTOMETRIST

## 2019-02-15 PROCEDURE — 92004 PR EYE EXAM, NEW PATIENT,COMPREHESV: ICD-10-PCS | Mod: S$GLB,,, | Performed by: OPTOMETRIST

## 2019-02-15 PROCEDURE — 99999 PR PBB SHADOW E&M-EST. PATIENT-LVL II: ICD-10-PCS | Mod: PBBFAC,,, | Performed by: OPTOMETRIST

## 2019-02-15 NOTE — PROGRESS NOTES
HPI     Pt is here for a routine eye exam.  JEAN CLAUDE: >2 years ago.  Wants a new CL Rx and Spec Rx today.  Pt reports history of stabismus, had two eye surgeries at 2yo and 6 yo,   denies double vision, will close one eye occassionally, reports poor depth   perception  Currently wearing Acuvue Oasys 1 day -4.00 OD, -4.50 OS  Pt reports history of corneal ulcers due to Cl overwear        Last edited by Cirilo Bradshaw, OD on 2/15/2019  9:59 AM. (History)            Assessment /Plan     For exam results, see Encounter Report.    Myopia of both eyes with astigmatism      1. Spec Rx given and Contact lens Rx released to pt. Daily wear only advised, with education to risks of extended wear.  Discussed lens care, compliance and solutions. RTC 2 weeks contact lens follow up. Trial with contacts toric and astig glasses.   . Different lens options discussed with patient. RTC for iop and dilation.

## 2019-02-15 NOTE — MEDICAL/APP STUDENT
Pt is here for a routine eye exam.  JEAN CLAUDE: >2 years ago.  Wants a new CL Rx and Spec Rx today.  Pt reports history of stabismus, had two eye surgeries at 1yo and 6 yo, denies double vision, will close one eye occassionally, reports poor depth perception  Currently wearing Acuvue Oasys 1 day -4.00 OD, -4.50 OS  Pt reports history of corneal ulcers due to Cl overwear

## 2019-03-28 ENCOUNTER — TELEPHONE (OUTPATIENT)
Dept: OPTOMETRY | Facility: CLINIC | Age: 34
End: 2019-03-28

## 2019-04-16 ENCOUNTER — OFFICE VISIT (OUTPATIENT)
Dept: OPTOMETRY | Facility: CLINIC | Age: 34
End: 2019-04-16
Payer: COMMERCIAL

## 2019-04-16 DIAGNOSIS — H52.203 MYOPIA OF BOTH EYES WITH ASTIGMATISM: Primary | ICD-10-CM

## 2019-04-16 DIAGNOSIS — H52.13 MYOPIA OF BOTH EYES WITH ASTIGMATISM: Primary | ICD-10-CM

## 2019-04-16 PROCEDURE — 99499 NO LOS: ICD-10-PCS | Mod: S$GLB,,, | Performed by: OPTOMETRIST

## 2019-04-16 PROCEDURE — 99499 UNLISTED E&M SERVICE: CPT | Mod: S$GLB,,, | Performed by: OPTOMETRIST

## 2019-04-16 PROCEDURE — 99999 PR PBB SHADOW E&M-EST. PATIENT-LVL II: ICD-10-PCS | Mod: PBBFAC,,, | Performed by: OPTOMETRIST

## 2019-04-16 PROCEDURE — 99999 PR PBB SHADOW E&M-EST. PATIENT-LVL II: CPT | Mod: PBBFAC,,, | Performed by: OPTOMETRIST

## 2019-04-16 NOTE — MEDICAL/APP STUDENT
Pt is here for CL f/u, IOP check, and DFE.  Did not like toric CLs OD.  Wearing original CLs that she came in with in Feb 2019. (Oasys 1 day)  Would like to know what reading glasses power she needs for computer

## 2019-04-16 NOTE — PROGRESS NOTES
HPI     Here for contact follow up and dfe  No problems  Did not like toric contact  Readers over contacts make her dizzy  Pt is here for CL f/u, IOP check, and DFE.  Did not like toric CLs OD.  Wearing original CLs that she came in with in Feb 2019. (Oasys 1 day)  Would like to know what reading glasses power she needs for computer         Last edited by Cirilo Bradshaw, OD on 4/16/2019  9:32 AM. (History)            Assessment /Plan     For exam results, see Encounter Report.    Myopia of both eyes with astigmatism      1. New Spec Rx given and Contact lens Rx released to pt. Daily wear only advised, with education to risks of extended wear.  Discussed lens care, compliance and solutions. RTC yearly contact lens follow up.   . Different lens options discussed with patient. RTC 1 year full exam.             Addend 5/19/20 extended clrx due to covid

## 2019-06-11 ENCOUNTER — OFFICE VISIT (OUTPATIENT)
Dept: UROGYNECOLOGY | Facility: CLINIC | Age: 34
End: 2019-06-11
Payer: COMMERCIAL

## 2019-06-11 VITALS — HEIGHT: 60 IN | WEIGHT: 106 LBS | BODY MASS INDEX: 20.81 KG/M2

## 2019-06-11 DIAGNOSIS — R39.15 URINARY URGENCY: ICD-10-CM

## 2019-06-11 DIAGNOSIS — F41.9 ANXIETY AND DEPRESSION: Primary | ICD-10-CM

## 2019-06-11 DIAGNOSIS — R35.0 URINARY FREQUENCY: ICD-10-CM

## 2019-06-11 DIAGNOSIS — M79.18 MYALGIA OF PELVIC FLOOR: ICD-10-CM

## 2019-06-11 DIAGNOSIS — F32.A ANXIETY AND DEPRESSION: Primary | ICD-10-CM

## 2019-06-11 DIAGNOSIS — R30.0 DYSURIA: ICD-10-CM

## 2019-06-11 PROCEDURE — 99999 PR PBB SHADOW E&M-EST. PATIENT-LVL III: CPT | Mod: PBBFAC,,, | Performed by: OBSTETRICS & GYNECOLOGY

## 2019-06-11 PROCEDURE — 3008F BODY MASS INDEX DOCD: CPT | Mod: CPTII,S$GLB,, | Performed by: OBSTETRICS & GYNECOLOGY

## 2019-06-11 PROCEDURE — 99213 PR OFFICE/OUTPT VISIT, EST, LEVL III, 20-29 MIN: ICD-10-PCS | Mod: S$GLB,,, | Performed by: OBSTETRICS & GYNECOLOGY

## 2019-06-11 PROCEDURE — 99999 PR PBB SHADOW E&M-EST. PATIENT-LVL III: ICD-10-PCS | Mod: PBBFAC,,, | Performed by: OBSTETRICS & GYNECOLOGY

## 2019-06-11 PROCEDURE — 3008F PR BODY MASS INDEX (BMI) DOCUMENTED: ICD-10-PCS | Mod: CPTII,S$GLB,, | Performed by: OBSTETRICS & GYNECOLOGY

## 2019-06-11 PROCEDURE — 99213 OFFICE O/P EST LOW 20 MIN: CPT | Mod: S$GLB,,, | Performed by: OBSTETRICS & GYNECOLOGY

## 2019-06-11 RX ORDER — CELECOXIB 200 MG/1
400 CAPSULE ORAL DAILY PRN
Refills: 0 | COMMUNITY
Start: 2019-05-14 | End: 2019-07-19

## 2019-06-11 RX ORDER — VALACYCLOVIR HYDROCHLORIDE 500 MG/1
TABLET, FILM COATED ORAL
COMMUNITY
Start: 2019-05-14 | End: 2019-12-10

## 2019-06-11 NOTE — PATIENT INSTRUCTIONS
1)  Frequent UTIs (bladder infections):  --s/p empirical treatment for ureaplasma/myoplasma  --If you feel like you have a UTI, please call our office so that we can place an order for you to drop off a urine specimen at the closest [facility] lab (we will arrange).     --We will call in antibiotics for you to start right after you drop off specimen.     --In this way, we can determine:     1)  Do you have a UTI?      2) If you have a UTI, is it sensitive to the antibiotics we prescribed?   --consider starting amitriptyline: 10 mg PO nightly x 7 days.  Add 10 mg every week until symptoms controlled. Max dose 50 mg PO nightly.  FOR FLARES:  Can take uribel up to 4x/day x 5 days.  If persists past that, call us.   --continue PT exercises at home  --follow UTI prevention tips (see attached)  --control bowel movements/fecal cross-contamination  --empty bladder before and after intercourse  --start taking 1 probiotic pill (any with lactobacillus and/or acidophilus) daily  --consider need for further evaluation (pelvic imaging and cystoscopy) if issue persists    If Irritative voiding symptoms:  --Empty bladder every 3 hours.  Empty well: wait a minute, lean forward on toilet.    --Avoid dietary irritants (see sheet).  Keep diary x 3-5 days to determine your irritants.  --can try prelief with meals, neutralizes acid, if having bad day or consuming a trigger  --continue pelvic floor PT with Francisca  --URGE: consider starting amitriptyline: 10 mg PO nightly x 7 days.  Add 10 mg every week until symptoms controlled. Max dose 50 mg PO nightly. consider medication daily (anticholinergic).    --control stress/anxiety, cognitive behavioral therapy; let psych know that stress level has increased    Accelerated Resolution Therapy (ART)  **Lorrie Waters  [address] [place] [address]    [address]  [phone]  [email or web address]    [facility] Human Services Authority:  Trinity Gilmore  Tevin -    [phone]    [phone]     [facility] District:  [facility] -    [phone]    [phone]     Bassam Miilan [facility], FT  (p) [phone]  [address] [place]    [address]    Delmy Perez [facility]  (p) [phone]  [address]   [address]  [place]     [address]    Aaliyah Lorenzo and her cell phone number is [phone].     [facility] [phone].      Dr. Balaji Hargrove [facility], FT  (p) [phone]  [address]   [address]  [place]     [address]  Website:  [email or web address]    [facility]  Mental Health Facility in [place], [place]  Address:  [address] [place] [address]  phone: [phone]    3) STI screening:  --12/18 STI screen normal    4) cramping mid-cycle:  --seems likely Mittelschmerz (ovulatory pain)  --consider ibuprofen or Aleve as needed  --see handout  --let us know if worsens    5) See GYN for annual this Fall/Winter:  Anni (Ranger Ghazala).  Eliana (Bernardo Goldman Henne, Ranger).  RTC 1 year with Urogyn.

## 2019-07-05 ENCOUNTER — PATIENT MESSAGE (OUTPATIENT)
Dept: ADMINISTRATIVE | Facility: HOSPITAL | Age: 34
End: 2019-07-05

## 2019-07-05 ENCOUNTER — PATIENT OUTREACH (OUTPATIENT)
Dept: ADMINISTRATIVE | Facility: HOSPITAL | Age: 34
End: 2019-07-05

## 2019-07-05 DIAGNOSIS — Z12.4 PAP SMEAR FOR CERVICAL CANCER SCREENING: Primary | ICD-10-CM

## 2019-07-18 ENCOUNTER — TELEPHONE (OUTPATIENT)
Dept: GASTROENTEROLOGY | Facility: CLINIC | Age: 34
End: 2019-07-18

## 2019-07-18 NOTE — TELEPHONE ENCOUNTER
Message left for patient to return my call regarding her appointment with Dr.James Anne on 9/10.   will not be in the office that day and will need to reschedule.

## 2019-07-19 ENCOUNTER — OFFICE VISIT (OUTPATIENT)
Dept: INTERNAL MEDICINE | Facility: CLINIC | Age: 34
End: 2019-07-19
Attending: INTERNAL MEDICINE
Payer: COMMERCIAL

## 2019-07-19 VITALS
DIASTOLIC BLOOD PRESSURE: 70 MMHG | HEIGHT: 60 IN | WEIGHT: 105.63 LBS | BODY MASS INDEX: 20.74 KG/M2 | HEART RATE: 79 BPM | OXYGEN SATURATION: 99 % | SYSTOLIC BLOOD PRESSURE: 100 MMHG

## 2019-07-19 DIAGNOSIS — E55.9 VITAMIN D DEFICIENCY: ICD-10-CM

## 2019-07-19 DIAGNOSIS — R10.13 DYSPEPSIA: ICD-10-CM

## 2019-07-19 DIAGNOSIS — M25.559 ARTHRALGIA OF HIP, UNSPECIFIED LATERALITY: ICD-10-CM

## 2019-07-19 DIAGNOSIS — Z00.00 ANNUAL PHYSICAL EXAM: Primary | ICD-10-CM

## 2019-07-19 DIAGNOSIS — R19.8 CHANGE IN BOWEL MOVEMENT: ICD-10-CM

## 2019-07-19 DIAGNOSIS — F90.9 ATTENTION DEFICIT HYPERACTIVITY DISORDER (ADHD), UNSPECIFIED ADHD TYPE: ICD-10-CM

## 2019-07-19 DIAGNOSIS — F41.9 ANXIETY AND DEPRESSION: ICD-10-CM

## 2019-07-19 DIAGNOSIS — M25.552 PAIN OF LEFT HIP JOINT: ICD-10-CM

## 2019-07-19 DIAGNOSIS — G47.00 INSOMNIA, UNSPECIFIED TYPE: ICD-10-CM

## 2019-07-19 DIAGNOSIS — Z01.419 VISIT FOR PELVIC EXAM: ICD-10-CM

## 2019-07-19 DIAGNOSIS — F32.A ANXIETY AND DEPRESSION: ICD-10-CM

## 2019-07-19 PROCEDURE — 99395 PR PREVENTIVE VISIT,EST,18-39: ICD-10-PCS | Mod: S$GLB,,, | Performed by: INTERNAL MEDICINE

## 2019-07-19 PROCEDURE — 99395 PREV VISIT EST AGE 18-39: CPT | Mod: S$GLB,,, | Performed by: INTERNAL MEDICINE

## 2019-07-19 PROCEDURE — 99999 PR PBB SHADOW E&M-EST. PATIENT-LVL IV: CPT | Mod: PBBFAC,,, | Performed by: INTERNAL MEDICINE

## 2019-07-19 PROCEDURE — 99999 PR PBB SHADOW E&M-EST. PATIENT-LVL IV: ICD-10-PCS | Mod: PBBFAC,,, | Performed by: INTERNAL MEDICINE

## 2019-07-19 NOTE — PROGRESS NOTES
"Subjective:   Patient ID: Syl Elise is a 33 y.o. female  Chief complaint:   Chief Complaint   Patient presents with    Annual Exam       HPI  Here for annual exam     Pt has hx or recurrent UTI's, urinary frequency/urgency   - followed by urogyn and stable     ADHD:   - Followed by Dr. Rene Rivera every 3 months   - taking adderall 20mg tid    Anxiety and depression: doing well on lexapro and followed by psychiatry as above   - is planning on starting counseling  - working in immigration and very stressful job     Insomnia  - improved since off of work for 3 weeks and attrib to work stress  - started meditation   - sleep improves with this   - present > 10 years   - tried different meds for ADD to see if would affect sleep less   - had sleep study at [facility] 2016 - no sleep apnea   Does not watch TV before bed - is on phone and still working to reduce blue light exposure prior to bedtime   Tried benadryl, melatonin - not effective or caused odd SE  In past,   Took xanax x 2 doses and did not like way it made her feel  - thinks tried trazodone  - given small rx for ambien some time ago by psych and taking sparingly less than 1 time per month  Working to follow good sleep hygiene      Hx of strabismus:   - had corrective surgery for this  - had appt 4/2019 for eye exam    At [facility]: had hx of intermittent hip pain for which she had extensive eval with sports med specialist at [facility], had imaging including xray and MRI but never f/u for results  Is performing PT exercises at home   - would like to f/u with sports med at [facility] - did not f/u last year but would like to go now while taking a leave from month     Today reports hip pain sx have improved but still has periods when sx flare - she would like to f/u with [facility] sports med to review MRI     She Made appt for gi in September for hx of 'weak stomach'   - reports "stomach doesn't feel right"  - denies reflux or nausea  - reports gassy and occ loose stools "   - trigger is if eats late   - never kept food diary   - one trigger is lactose     Due for: tdap  Pap through gyn      Vit d: resumed taking otc vit d - unsure of dose     Review of Systems    Objective:  Vitals:    07/19/19 0835   BP: 100/70   Pulse: 79   SpO2: 99%   Weight: 47.9 kg (105 lb 9.6 oz)   Height: 5' (1.524 m)     Body mass index is 20.62 kg/m².    Physical Exam   Constitutional: She is oriented to person, place, and time. She appears well-developed and well-nourished.   HENT:   Head: Normocephalic and atraumatic.   Right Ear: External ear normal.   Left Ear: External ear normal.   Nose: Nose normal.   Mouth/Throat: Oropharynx is clear and moist. No oropharyngeal exudate.   Eyes: Conjunctivae and EOM are normal.   Neck: Neck supple. No thyromegaly present.   Cardiovascular: Normal rate, regular rhythm, normal heart sounds and intact distal pulses.   Pulmonary/Chest: Effort normal and breath sounds normal.   Abdominal: Soft. Bowel sounds are normal.   Musculoskeletal: She exhibits no edema or tenderness.   Lymphadenopathy:     She has no cervical adenopathy.   Neurological: She is alert and oriented to person, place, and time.   Skin: Skin is warm and dry.   Psychiatric: Her behavior is normal. Thought content normal.   Vitals reviewed.      Assessment:  1. Annual physical exam    2. Vitamin D deficiency    3. Anxiety and depression    4. Attention deficit hyperactivity disorder (ADHD), unspecified ADHD type    5. Arthralgia of hip, unspecified laterality    6. Dyspepsia    7. Change in bowel movement    8. Pain of left hip joint    9. Visit for pelvic exam    10. Insomnia, unspecified type        Plan:  Syl was seen today for annual exam.    Diagnoses and all orders for this visit:    Annual physical exam  -     Vitamin D; Future  -     CBC auto differential; Future  -     Comprehensive metabolic panel; Future  -     TSH; Future  Recommend daily sunscreen, cardiovascular exercise min 30 min 5  days per week. Seatbelts routinely.    Vitamin D deficiency  -     Vitamin D; Future  cotn vit d and check level     Anxiety and depression  Stable   Followed by psych and cont med     Attention deficit hyperactivity disorder (ADHD), unspecified ADHD type  As above    Arthralgia of hip, unspecified laterality  -     Ambulatory Referral to Sports Medicine  Bring imaging to appt to be reviewed   Cont home PT exercises    Dyspepsia  -     H. PYLORI ANTIBODY, IGG; Future  -     Tissue transglutaminase, IgA; Future  -     IgA; Future  Keep food diary and log of sx to identify triggers   Check labs   Trial of zantac otc for 2-4 weeks   Limit caffeine, acidic and spicy foods     Change in bowel movement  -     Tissue transglutaminase, IgA; Future  -     IgA; Future    Pain of left hip joint    Visit for pelvic exam  -     Ambulatory Referral to Obstetrics / Gynecology    Insomnia, unspecified type  Stable   Cont to work on good sleep hygiene     Other orders  -     (In Office Administered) Tdap Vaccine  -     ranitidine (ZANTAC 75) 75 MG tablet; Take 1 tablet (75 mg total) by mouth 2 (two) times daily.    Health Maintenance   Topic Date Due    TETANUS VACCINE  11/13/2001    Pap Smear with HPV Cotest  11/13/2004    Influenza Vaccine  08/01/2019    Lipid Panel  Completed

## 2019-07-22 ENCOUNTER — TELEPHONE (OUTPATIENT)
Dept: INTERNAL MEDICINE | Facility: CLINIC | Age: 34
End: 2019-07-22

## 2019-07-22 NOTE — TELEPHONE ENCOUNTER
Left voice message for patient to schedule appointment from referral to Sports Medicine and OB-GYN.  Tom BARILLAS  [phone]

## 2019-07-24 ENCOUNTER — TELEPHONE (OUTPATIENT)
Dept: OPTOMETRY | Facility: CLINIC | Age: 34
End: 2019-07-24

## 2019-07-25 ENCOUNTER — PATIENT MESSAGE (OUTPATIENT)
Dept: GASTROENTEROLOGY | Facility: CLINIC | Age: 34
End: 2019-07-25

## 2019-07-26 ENCOUNTER — TELEPHONE (OUTPATIENT)
Dept: GASTROENTEROLOGY | Facility: CLINIC | Age: 34
End: 2019-07-26

## 2019-07-26 NOTE — TELEPHONE ENCOUNTER
----- Message from Jane Lux sent at 7/26/2019  3:39 PM CDT -----  Contact: Self- [phone]  Omid- pt states she is returning a missed call to reschedule np appt scheduled 9/10- states Dr. Anne will be out of office that day- please contact pt at [phone]

## 2019-07-26 NOTE — TELEPHONE ENCOUNTER
Returned patient's call, but she did not answer. Left voicemail for patient to return a call to our clinic.

## 2019-08-07 ENCOUNTER — TELEPHONE (OUTPATIENT)
Dept: OPTOMETRY | Facility: CLINIC | Age: 34
End: 2019-08-07

## 2019-08-14 ENCOUNTER — TELEPHONE (OUTPATIENT)
Dept: SPORTS MEDICINE | Facility: CLINIC | Age: 34
End: 2019-08-14

## 2019-08-14 NOTE — TELEPHONE ENCOUNTER
Called patient to schedule appointment and there was no answer, I encouraged her to return the call to schedule an appointment.

## 2019-09-05 ENCOUNTER — TELEPHONE (OUTPATIENT)
Dept: GASTROENTEROLOGY | Facility: CLINIC | Age: 34
End: 2019-09-05

## 2019-09-05 NOTE — TELEPHONE ENCOUNTER
----- Message from Magaly Guadarrama MA sent at 9/3/2019  4:12 PM CDT -----  Contact: [phone]  Pt had an appt on 9/10/19 that was canceled (book-out??) , she would like to be rescheduled for the next soonest appt.  [phone]

## 2019-10-14 ENCOUNTER — TELEPHONE (OUTPATIENT)
Dept: GASTROENTEROLOGY | Facility: CLINIC | Age: 34
End: 2019-10-14

## 2019-10-14 NOTE — TELEPHONE ENCOUNTER
Attempt to call patient to offer sooner appointment. No answer, voice mail full, unable to leave message.

## 2019-10-28 ENCOUNTER — PATIENT OUTREACH (OUTPATIENT)
Dept: ADMINISTRATIVE | Facility: OTHER | Age: 34
End: 2019-10-28

## 2019-12-05 ENCOUNTER — PATIENT OUTREACH (OUTPATIENT)
Dept: ADMINISTRATIVE | Facility: OTHER | Age: 34
End: 2019-12-05

## 2019-12-10 ENCOUNTER — OFFICE VISIT (OUTPATIENT)
Dept: GASTROENTEROLOGY | Facility: CLINIC | Age: 34
End: 2019-12-10
Payer: COMMERCIAL

## 2019-12-10 ENCOUNTER — LAB VISIT (OUTPATIENT)
Dept: LAB | Facility: HOSPITAL | Age: 34
End: 2019-12-10
Attending: INTERNAL MEDICINE
Payer: COMMERCIAL

## 2019-12-10 VITALS
HEIGHT: 60 IN | WEIGHT: 110 LBS | DIASTOLIC BLOOD PRESSURE: 80 MMHG | SYSTOLIC BLOOD PRESSURE: 115 MMHG | BODY MASS INDEX: 21.6 KG/M2

## 2019-12-10 DIAGNOSIS — K52.9 CHRONIC DIARRHEA: ICD-10-CM

## 2019-12-10 DIAGNOSIS — R10.9 ABDOMINAL PAIN, UNSPECIFIED ABDOMINAL LOCATION: ICD-10-CM

## 2019-12-10 DIAGNOSIS — R14.0 BLOATING: ICD-10-CM

## 2019-12-10 DIAGNOSIS — K52.9 CHRONIC DIARRHEA: Primary | ICD-10-CM

## 2019-12-10 PROCEDURE — 3008F BODY MASS INDEX DOCD: CPT | Mod: CPTII,S$GLB,, | Performed by: NURSE PRACTITIONER

## 2019-12-10 PROCEDURE — 36415 COLL VENOUS BLD VENIPUNCTURE: CPT | Mod: PO

## 2019-12-10 PROCEDURE — 86140 C-REACTIVE PROTEIN: CPT

## 2019-12-10 PROCEDURE — 99999 PR PBB SHADOW E&M-EST. PATIENT-LVL III: ICD-10-PCS | Mod: PBBFAC,,, | Performed by: NURSE PRACTITIONER

## 2019-12-10 PROCEDURE — 99204 PR OFFICE/OUTPT VISIT, NEW, LEVL IV, 45-59 MIN: ICD-10-PCS | Mod: S$GLB,,, | Performed by: NURSE PRACTITIONER

## 2019-12-10 PROCEDURE — 3008F PR BODY MASS INDEX (BMI) DOCUMENTED: ICD-10-PCS | Mod: CPTII,S$GLB,, | Performed by: NURSE PRACTITIONER

## 2019-12-10 PROCEDURE — 99999 PR PBB SHADOW E&M-EST. PATIENT-LVL III: CPT | Mod: PBBFAC,,, | Performed by: NURSE PRACTITIONER

## 2019-12-10 PROCEDURE — 99204 OFFICE O/P NEW MOD 45 MIN: CPT | Mod: S$GLB,,, | Performed by: NURSE PRACTITIONER

## 2019-12-10 NOTE — PROGRESS NOTES
[facility] Consultation Note    Reason for Consult:  The primary encounter diagnosis was Chronic diarrhea. Diagnoses of Abdominal pain, unspecified abdominal location and Bloating were also pertinent to this visit.    PCP:   Caty Miller   No address on file    Referring MD:  Aaareferral Self  No address on file    HPI:  This is a 34 y.o. female here for evaluation of abd pain, diarrhea, and bloating . She is a new patient.  Off and on many years. Reports she had an EGD and colonoscopy 10 years ago that were normal. Sensitive stomach since childhood.  She does have some good days/weeks.  If she eats late, fatty foods she may develop a stomach ache. Feels sore for a couple of days.Warm water can help the stomach ache. +Bloating and +gas.  Gets diarrhea maybe every other day for a week then maybe not at all for a couple of days. Feels like this is cyclical. Can be on BM in a day, or it maybe 4 BMs on a bad day. No hematochezia, melena, nausea, vomiting.  No dysphagia, GERD.    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    ROS:  Constitutional: No fevers, chills, No weight loss  ENT: No allergies  CV: No chest pain  Pulm: No cough, No shortness of breath  Ophtho: No vision changes  GI: see HPI  Derm: No rash  Heme:  No bruising  MSK: No arthritis  : No dysuria, No hematuria  Neuro: No syncope, No seizure  Psych: + anxiety, + depression    Medical History:  has a past medical history of Abnormal Pap smear of cervix (2004), ADHD (attention deficit hyperactivity disorder), Hip pain, Strabismus, and Vitamin D deficiency.    Surgical History:  has a past surgical history that includes Cervical biopsy w/ loop electrode excision (2005) and Eye surgery (1986, 1990).    Family History: family history includes Depression in her sister; Diabetes in her maternal aunt and maternal grandmother;  Hypertension in her father and mother; Hypothyroidism in her mother; Osteoporosis in her mother; Strabismus in her mother..     Social History:  reports that she has never smoked. She has never used smokeless tobacco. She reports that she drinks alcohol. She reports that she does not use drugs.    Review of patient's allergies indicates:   Allergen Reactions    Bactrim [sulfamethoxazole-trimethoprim] Other (See Comments)     Skin tingling, muscle aches/weakness    Sulfa (sulfonamide antibiotics)      Other reaction(s): Other (See Comments)  Skin numbness       Current Outpatient Medications on File Prior to Visit   Medication Sig Dispense Refill    cholecalciferol, vitamin D3, (VITAMIN D3) 2,000 unit Cap Take 1 capsule (2,000 Units total) by mouth once daily.      dextroamphetamine-amphetamine (ADDERALL) 20 mg tablet 20 mg 3 (three) times daily.       escitalopram oxalate (LEXAPRO) 20 MG tablet Take 20 mg by mouth once daily.  0    multivitamin Liqd Take by mouth.      amitriptyline (ELAVIL) 10 MG tablet 10 mg PO nightly x 7 days.  Add 10 mg every week until symptoms controlled. Max dose 50 mg PO nightly. (Patient not taking: Reported on 12/10/2019) 60 tablet 11    fexofenadine (ALLEGRA) 180 MG tablet Take 180 mg by mouth.      FLUCELVAX QUAD 9400-7865 60 mcg (15 mcg x 4)/0.5 mL Susp ADM 0.5ML IM UTD  0    methen-m.blue-s.[facility]-phsal-hyo 118-10-40.8-36 mg Cap Take 1 capsule by mouth 4 (four) times daily as needed. (Patient not taking: Reported on 12/10/2019) 20 capsule 6    ranitidine (ZANTAC 75) 75 MG tablet Take 1 tablet (75 mg total) by mouth 2 (two) times daily. (Patient not taking: Reported on 12/10/2019) 60 tablet 0    [DISCONTINUED] valACYclovir (VALTREX) 500 MG tablet        No current facility-administered medications on file prior to visit.          Objective Findings:    Vital Signs:  /80 (BP Location: Right arm)   Ht 5' (1.524 m)   Wt 49.9 kg (110 lb 0.2 oz)   BMI 21.48 kg/m²   Body  mass index is 21.48 kg/m².    Physical Exam:  General Appearance: Well appearing in no acute distress  Head:   Normocephalic, without obvious abnormality  Eyes:    No scleral icterus  ENT: Neck supple  Lungs: CTA bilaterally in anterior and posterior fields, no wheezes, no crackles.  Heart:  Regular rate and rhythm, S1, S2 normal, no murmurs heard  Abdomen: Soft, non tender, non distended with positive bowel sounds in all four quadrants. No hepatosplenomegaly, ascites, or mass  Extremities: No edema  Skin: No rash  Neurologic: AAO x 3      Labs:  Lab Results   Component Value Date    WBC 5.43 07/13/2018    HGB 12.7 07/13/2018    HCT 38.8 07/13/2018     07/13/2018    CHOL 141 07/13/2018    TRIG 25 (L) 07/13/2018    HDL 73 07/13/2018    ALT 17 07/13/2018    AST 21 07/13/2018     07/13/2018    K 3.9 07/13/2018     07/13/2018    CREATININE 0.8 07/13/2018    BUN 12 07/13/2018    CO2 26 07/13/2018    TSH 1.563 07/13/2018       Imaging reviewed:  None  Endoscopy: reviewed    Reports normal scopes 10 years ago    Assessment:    Ms. Elise is a 34 y.o. WF with:    1. Chronic diarrhea    2. Abdominal pain, unspecified abdominal location    3. Bloating         Sx present since childhood but have recently gotten worse. Sx seem consistent with IBS but still will r/o celiac, HP and IBD.    Recommendations:  1.Labs  2. EGD and colonoscopy  3. If above normal will r/o SIBO    F/u pending scopes    Order summary:  Orders Placed This Encounter    C-reactive protein    Case request GI: EGD (ESOPHAGOGASTRODUODENOSCOPY), COLONOSCOPY         Thank you so much for allowing me to participate in the care of Syl Elise    JALEN Rouse

## 2019-12-10 NOTE — PATIENT INSTRUCTIONS
The following foods have been identified as being high in FODMAPs:   THESE ARE FOODS TO AVOID  Fruits    Apples  Apricots  Blackberries  Cherries  Grapefruit  [place]  Nectarines  Peaches  Pears  Plums and prunes  Pomegranates  Watermelon  High concentration of fructose from canned fruit, dried fruit or fruit juice  Grains    Barley  Couscous  [place]  Rye  Semolina  Wheat  Lactose-Containing Foods    Buttermilk  Cream  Custard  Ice cream  Margarine  Milk (cow, goat, sheep)  Soft cheese, including cottage cheese and ricotta  Yogurt (regular and Greek)  Dairy Substitutes    Oat milk (although a 1/8 serving is considered low-FODMAP)  Soy milk ([place]  Legumes    Baked beans  Black-eyed peas  Butter beans  Chickpeas  Lentils  Kidney beans  Lima beans  Soybeans  Split peas  Sweeteners    Agave  Fructose  High fructose corn syrup  Honey  Isomalt  Maltitol  Mannitol  Molasses  Sorbitol  Xylitol  Vegetables    Artichokes  Asparagus  Beets  Somis sprouts  Cauliflower  Celery  Garlic  Leeks  Mushrooms  Okra  Onions  Peas  Scallions (white parts)  Shallots  Snow peas  Sugar snap peas          The following foods have been identified as being low in FODMAPs:  THESE ARE FOODS THAT ARE OKAY TO EAT  Fruits    Avocado (limit 1/8 of whole)  Banana  Blueberry  Cantaloupe  Grapes  Honeydew melon  Kiwi  Lemon  Lime  Mandarin oranges  Olives  Orange  Papaya  Plantain  Pineapple  Raspberry  Rhubarb  Strawberry  Tangelo  Sweeteners    Artificial sweeteners that do not end in -ol  Brown sugar  Glucose  Maple syrup  Powdered sugar  Sugar (sucrose)  Dairy and Alternatives    [place] milk  Coconut milk (limit 1/2 cup)  Hemp milk  Rice milk  Butter  Certain cheeses, such as  brie, camembert, mozzarella, Parmesan  Lactose-free products, such as lactose-free milk, ice cream, and yogurt  Vegetables    Arugula (rocket lettuce)  Bamboo shoots  Bell peppers  Broccoli  Bok santhosh  Carrots  Celeriac  Kathy greens  Common Cabbage  Corn (half a  cob)  Eggplant  Endive  Fennel  Green beans  Kale  Lettuce  Parsley  Parsnip  Potato  Radicchio   Scallions (green parts only)  Spinach, baby  Squash  Sweet potato  Swiss chard  Tomato  Turnip  Water chestnut  Zucchini  Grains    Amaranth  Brown rice  Bulgur wheat (limit to 1/4 cup cooked)  Oats  Gluten-free products  Quinoa  Spelt products  Nuts    Almonds (limit 10)  Brazil nuts  Hazelnuts (limit 10)  Macadamia nuts  Peanuts  Pecan  Pine nuts  Walnuts  Seeds    Sherrie  Sandeep  Pumpkin  Sesame  Sunflower  Protein Sources    Beef  Chicken  Eggs  Fish  Lamb  Pork  Shellfish  Tofu and tempeh  [place]

## 2019-12-11 ENCOUNTER — TELEPHONE (OUTPATIENT)
Dept: GASTROENTEROLOGY | Facility: CLINIC | Age: 34
End: 2019-12-11

## 2019-12-11 LAB — CRP SERPL-MCNC: 0.3 MG/L (ref 0–8.2)

## 2019-12-11 NOTE — TELEPHONE ENCOUNTER
MA contacted pt to let her know some information regarding her labs. Pt did not answer, MA left message for pt to give the clinic a call.

## 2019-12-11 NOTE — TELEPHONE ENCOUNTER
MA contacted pt to give test results per a pt. Pt verbalized understanding. MA also scheduled pt for her labs that her PCP placed , that Sofia would like for her to have done as well. Pt verbalized understanding and will go get labs done on 12/16 .     ----- Message from Sofia Martinez sent at 12/11/2019  9:29 AM CST -----  Contact: pt   Pt is returning your call.  Please call pt. Pt said you can leave voice mail

## 2020-01-02 ENCOUNTER — TELEPHONE (OUTPATIENT)
Dept: ENDOSCOPY | Facility: HOSPITAL | Age: 35
End: 2020-01-02

## 2020-01-06 DIAGNOSIS — Z12.11 SPECIAL SCREENING FOR MALIGNANT NEOPLASMS, COLON: Primary | ICD-10-CM

## 2020-01-06 RX ORDER — SODIUM, POTASSIUM,MAG SULFATES 17.5-3.13G
1 SOLUTION, RECONSTITUTED, ORAL ORAL DAILY
Qty: 1 KIT | Refills: 0 | Status: SHIPPED | OUTPATIENT
Start: 2020-01-06 | End: 2020-01-08

## 2020-01-17 ENCOUNTER — TELEPHONE (OUTPATIENT)
Dept: OPHTHALMOLOGY | Facility: CLINIC | Age: 35
End: 2020-01-17

## 2020-01-17 DIAGNOSIS — R30.0 DYSURIA: ICD-10-CM

## 2020-01-17 NOTE — TELEPHONE ENCOUNTER
----- Message from Cherelle Anne sent at 1/17/2020  6:59 AM CST -----  Contact: patient portal request  Hello, I am forwarding the original message from the patient portal.  Please call pt to schedule.  Thanks!     Original Message      ----- Message -----     From: Syl Elise     Sent: 1/16/2020 11:29 PM CST       To: Patient Appointment Schedule Request Mailing List  Subject: Appointment Request    Appointment Request From: Syl Elise    With Provider: Titi Pino    Preferred Date Range: 1/21/2020 - 2/25/2020    Preferred Times: Any time    Reason for visit: Strabismus    Comments:  Strabismus

## 2020-01-19 ENCOUNTER — OFFICE VISIT (OUTPATIENT)
Dept: URGENT CARE | Facility: CLINIC | Age: 35
End: 2020-01-19
Payer: COMMERCIAL

## 2020-01-19 VITALS
RESPIRATION RATE: 20 BRPM | OXYGEN SATURATION: 96 % | DIASTOLIC BLOOD PRESSURE: 79 MMHG | HEART RATE: 84 BPM | HEIGHT: 60 IN | WEIGHT: 110 LBS | SYSTOLIC BLOOD PRESSURE: 129 MMHG | TEMPERATURE: 99 F | BODY MASS INDEX: 21.6 KG/M2

## 2020-01-19 DIAGNOSIS — R39.9 UTI SYMPTOMS: ICD-10-CM

## 2020-01-19 DIAGNOSIS — R30.0 DYSURIA: Primary | ICD-10-CM

## 2020-01-19 LAB
B-HCG UR QL: NEGATIVE
BILIRUB UR QL STRIP: NEGATIVE
CTP QC/QA: YES
GLUCOSE UR QL STRIP: NEGATIVE
KETONES UR QL STRIP: POSITIVE
LEUKOCYTE ESTERASE UR QL STRIP: NEGATIVE
PH, POC UA: 6 (ref 5–8)
POC BLOOD, URINE: POSITIVE
POC NITRATES, URINE: NEGATIVE
PROT UR QL STRIP: POSITIVE
SP GR UR STRIP: 1.02 (ref 1–1.03)
UROBILINOGEN UR STRIP-ACNC: NORMAL (ref 0.1–1.1)

## 2020-01-19 PROCEDURE — 81025 URINE PREGNANCY TEST: CPT | Mod: S$GLB,,, | Performed by: PHYSICIAN ASSISTANT

## 2020-01-19 PROCEDURE — 99214 OFFICE O/P EST MOD 30 MIN: CPT | Mod: 25,S$GLB,, | Performed by: PHYSICIAN ASSISTANT

## 2020-01-19 PROCEDURE — 81003 POCT URINALYSIS, DIPSTICK, AUTOMATED, W/O SCOPE: ICD-10-PCS | Mod: QW,S$GLB,, | Performed by: PHYSICIAN ASSISTANT

## 2020-01-19 PROCEDURE — 81003 URINALYSIS AUTO W/O SCOPE: CPT | Mod: QW,S$GLB,, | Performed by: PHYSICIAN ASSISTANT

## 2020-01-19 PROCEDURE — 87086 URINE CULTURE/COLONY COUNT: CPT

## 2020-01-19 PROCEDURE — 87088 URINE BACTERIA CULTURE: CPT

## 2020-01-19 PROCEDURE — 81025 POCT URINE PREGNANCY: ICD-10-PCS | Mod: S$GLB,,, | Performed by: PHYSICIAN ASSISTANT

## 2020-01-19 PROCEDURE — 99214 PR OFFICE/OUTPT VISIT, EST, LEVL IV, 30-39 MIN: ICD-10-PCS | Mod: 25,S$GLB,, | Performed by: PHYSICIAN ASSISTANT

## 2020-01-19 RX ORDER — NITROFURANTOIN 25; 75 MG/1; MG/1
100 CAPSULE ORAL 2 TIMES DAILY
Qty: 10 CAPSULE | Refills: 0 | Status: SHIPPED | OUTPATIENT
Start: 2020-01-19 | End: 2020-01-24

## 2020-01-19 NOTE — PROGRESS NOTES
Subjective:       Patient ID: Syl Elise is a 34 y.o. female.    Vitals:  height is 5' (1.524 m) and weight is 49.9 kg (110 lb). Her temperature is 98.8 °F (37.1 °C). Her blood pressure is 129/79 and her pulse is 84. Her respiration is 20 and oxygen saturation is 96%.     Chief Complaint: Urinary Tract Infection    Pt presents with urinary urgency, frequency, dysuria, suprapubic pressure that began 4 days ago.  She has been taking urabel for symptom relief.  She denies flank pain, nausea, vomiting, fever.  Patient has a history of recurrent UTI symptoms, has seen her gynecologist in the past urine.  She states that the always resolved with antibiotics.  She states that sometimes the dipstick or the urine culture negative.    Urinary Tract Infection    This is a recurrent problem. Episode onset: 3 days ago. The problem occurs every urination. The problem has been unchanged. The quality of the pain is described as burning and aching. There has been no fever. She is sexually active. Associated symptoms include frequency and urgency. Pertinent negatives include no chills, flank pain, hematuria, nausea, vomiting or rash.       Constitution: Negative for chills, sweating, fatigue, fever, unexpected weight change and generalized weakness.   HENT: Negative for sinus pain and sore throat.    Neck: Negative for neck pain, neck stiffness and painful lymph nodes.   Cardiovascular: Negative for chest pain, palpitations and sob on exertion.   Gastrointestinal: Negative for abdominal pain, nausea and vomiting.   Genitourinary: Positive for dysuria, frequency and urgency. Negative for urine decreased, flank pain, bladder incontinence, bed wetting, hematuria, history of kidney stones, painful menstruation, irregular menstruation, missed menses, heavy menstrual bleeding, ovarian cysts, genital trauma, vaginal pain, vaginal discharge, vaginal bleeding, vaginal odor, painful intercourse, genital sore, painful ejaculation and  pelvic pain.   Musculoskeletal: Negative for back pain.   Skin: Negative for rash and lesion.   Hematologic/Lymphatic: Negative for swollen lymph nodes.       Objective:      Physical Exam   Constitutional: She is oriented to person, place, and time. She appears well-developed and well-nourished.  Non-toxic appearance. She does not have a sickly appearance. She does not appear ill. No distress.   HENT:   Head: Normocephalic and atraumatic.   Right Ear: External ear normal.   Left Ear: External ear normal.   Nose: Nose normal. No nasal deformity. No epistaxis.   Mouth/Throat: Oropharynx is clear and moist and mucous membranes are normal.   Eyes: Lids are normal.   Neck: Trachea normal, normal range of motion and phonation normal. Neck supple.   Cardiovascular: Normal pulses.   Pulmonary/Chest: Effort normal.   Abdominal: Soft. Normal appearance and bowel sounds are normal. She exhibits no distension. There is tenderness in the suprapubic area. There is no rigidity, no rebound, no guarding, no CVA tenderness, no tenderness at McBurney's point and negative Locke's sign.   Mild suprapubic TTP.  Abdomen is soft without rigidity or guarding.  No CVA tenderness.   Neurological: She is alert and oriented to person, place, and time.   Skin: Skin is warm, dry and intact.   Psychiatric: She has a normal mood and affect. Her speech is normal and behavior is normal. Cognition and memory are normal.   Nursing note and vitals reviewed.          Results for orders placed or performed in visit on 01/19/20   POCT Urinalysis, Dipstick, Automated, W/O Scope   Result Value Ref Range    POC Blood, Urine Positive (A) Negative    POC Bilirubin, Urine Negative Negative    POC Urobilinogen, Urine normal 0.1 - 1.1    POC Ketones, Urine Positive (A) Negative    POC Protein, Urine Positive (A) Negative    POC Nitrates, Urine Negative Negative    POC Glucose, Urine Negative Negative    pH, UA 6.0 5 - 8    POC Specific Gravity, Urine 1.020 1.003  - 1.029    POC Leukocytes, Urine Negative Negative   POCT urine pregnancy   Result Value Ref Range    POC Preg Test, Ur Negative Negative     Acceptable Yes        Assessment:       1. Dysuria    2. UTI symptoms        Plan:         Dysuria  -     POCT Urinalysis, Dipstick, Automated, W/O Scope  -     POCT urine pregnancy    UTI symptoms  -     nitrofurantoin, macrocrystal-monohydrate, (MACROBID) 100 MG capsule; Take 1 capsule (100 mg total) by mouth 2 (two) times daily. for 5 days  Dispense: 10 capsule; Refill: 0  -     Urine culture      Patient Instructions   - Rest.    - Drink plenty of fluids.    - Acetaminophen (tylenol) or Ibuprofen (advil,motrin) as directed as needed for fever/pain. Avoid tylenol if you have a history of liver disease. Do not take ibuprofen if you have a history of GI bleeding, kidney disease, or if you take blood thinners.     - You have been given an antibiotic to treat your condition today.    - Please complete the antibiotic as directed on the bottle.   - If you are female and on oral birth control pills, use additional methods to prevent pregnancy while on antibiotics and for one cycle after.   - you can take over-the-counter probiotics during and after antibiotic use to help preserve gut brandy and reduce gastrointestinal symptoms    - we will call you in the next 3-7 days with urine culture results    - Follow up with your PCP or specialty clinic as directed in the next 1-2 weeks if not improved or as needed.  You can call [phone] to schedule an appointment with the appropriate provider.    - Go to the ER or seek medical attention immediately if you develop new or worsening symptoms.    - You must understand that you have received an Urgent Care treatment only and that you may be released before all of your medical problems are known or treated.   - You, the patient, will arrange for follow up care as instructed.   - If your condition worsens or fails to improve  we recommend that you receive another evaluation at the ER immediately or contact your PCP to discuss your concerns or return here.

## 2020-01-19 NOTE — PATIENT INSTRUCTIONS
- Rest.    - Drink plenty of fluids.    - Acetaminophen (tylenol) or Ibuprofen (advil,motrin) as directed as needed for fever/pain. Avoid tylenol if you have a history of liver disease. Do not take ibuprofen if you have a history of GI bleeding, kidney disease, or if you take blood thinners.     - You have been given an antibiotic to treat your condition today.    - Please complete the antibiotic as directed on the bottle.   - If you are female and on oral birth control pills, use additional methods to prevent pregnancy while on antibiotics and for one cycle after.   - you can take over-the-counter probiotics during and after antibiotic use to help preserve gut brandy and reduce gastrointestinal symptoms    - we will call you in the next 3-7 days with urine culture results    - Follow up with your PCP or specialty clinic as directed in the next 1-2 weeks if not improved or as needed.  You can call [phone] to schedule an appointment with the appropriate provider.    - Go to the ER or seek medical attention immediately if you develop new or worsening symptoms.    - You must understand that you have received an Urgent Care treatment only and that you may be released before all of your medical problems are known or treated.   - You, the patient, will arrange for follow up care as instructed.   - If your condition worsens or fails to improve we recommend that you receive another evaluation at the ER immediately or contact your PCP to discuss your concerns or return here.

## 2020-01-21 ENCOUNTER — TELEPHONE (OUTPATIENT)
Dept: URGENT CARE | Facility: CLINIC | Age: 35
End: 2020-01-21

## 2020-01-21 LAB — BACTERIA UR CULT: ABNORMAL

## 2020-01-21 NOTE — TELEPHONE ENCOUNTER
Left message for patient regarding urine culture.  Susceptibility testing is not done on staff that was found in urine.  Called patient to see how she was feeling.  Left voicemail for patient to return phone call to clinic

## 2020-01-23 ENCOUNTER — TELEPHONE (OUTPATIENT)
Dept: URGENT CARE | Facility: CLINIC | Age: 35
End: 2020-01-23

## 2020-01-28 ENCOUNTER — PATIENT OUTREACH (OUTPATIENT)
Dept: ADMINISTRATIVE | Facility: OTHER | Age: 35
End: 2020-01-28

## 2020-02-05 ENCOUNTER — PATIENT OUTREACH (OUTPATIENT)
Dept: ADMINISTRATIVE | Facility: OTHER | Age: 35
End: 2020-02-05

## 2020-02-05 ENCOUNTER — NURSE TRIAGE (OUTPATIENT)
Dept: ADMINISTRATIVE | Facility: CLINIC | Age: 35
End: 2020-02-05

## 2020-02-06 DIAGNOSIS — Z12.11 SPECIAL SCREENING FOR MALIGNANT NEOPLASMS, COLON: Primary | ICD-10-CM

## 2020-02-06 RX ORDER — SODIUM, POTASSIUM,MAG SULFATES 17.5-3.13G
1 SOLUTION, RECONSTITUTED, ORAL ORAL DAILY
Qty: 1 KIT | Refills: 0 | Status: ON HOLD | OUTPATIENT
Start: 2020-02-06 | End: 2020-02-07 | Stop reason: HOSPADM

## 2020-02-06 NOTE — TELEPHONE ENCOUNTER
Patient called in about prep for colonoscopy/egd and needs prep medicine.     Reason for Disposition   [1] Caller requesting NON-URGENT health information AND [2] PCP's office is the best resource    Additional Information   Negative: [1] Caller is not with the adult (patient) AND [2] reporting urgent symptoms   Negative: Lab result questions   Negative: Medication questions   Negative: Caller can't be reached by phone   Negative: Caller has already spoken to PCP or another triager   Negative: RN needs further essential information from caller in order to complete triage   Negative: Requesting regular office appointment    Protocols used: INFORMATION ONLY CALL-A-

## 2020-02-07 ENCOUNTER — HOSPITAL ENCOUNTER (OUTPATIENT)
Facility: HOSPITAL | Age: 35
Discharge: HOME | End: 2020-02-07
Attending: INTERNAL MEDICINE | Admitting: INTERNAL MEDICINE
Payer: COMMERCIAL

## 2020-02-07 ENCOUNTER — ANESTHESIA (OUTPATIENT)
Dept: ENDOSCOPY | Facility: HOSPITAL | Age: 35
End: 2020-02-07
Payer: COMMERCIAL

## 2020-02-07 ENCOUNTER — ANESTHESIA EVENT (OUTPATIENT)
Dept: ENDOSCOPY | Facility: HOSPITAL | Age: 35
End: 2020-02-07
Payer: COMMERCIAL

## 2020-02-07 VITALS
HEIGHT: 60 IN | SYSTOLIC BLOOD PRESSURE: 100 MMHG | BODY MASS INDEX: 22.58 KG/M2 | RESPIRATION RATE: 18 BRPM | HEART RATE: 76 BPM | WEIGHT: 115 LBS | DIASTOLIC BLOOD PRESSURE: 57 MMHG | TEMPERATURE: 98 F | OXYGEN SATURATION: 100 %

## 2020-02-07 DIAGNOSIS — R19.7 DIARRHEA: ICD-10-CM

## 2020-02-07 LAB
B-HCG UR QL: NEGATIVE
CTP QC/QA: YES

## 2020-02-07 PROCEDURE — 45380 COLONOSCOPY AND BIOPSY: CPT | Performed by: INTERNAL MEDICINE

## 2020-02-07 PROCEDURE — 88305 TISSUE EXAM BY PATHOLOGIST: CPT | Performed by: PATHOLOGY

## 2020-02-07 PROCEDURE — 27201012 HC FORCEPS, HOT/COLD, DISP: Performed by: INTERNAL MEDICINE

## 2020-02-07 PROCEDURE — 88305 TISSUE EXAM BY PATHOLOGIST: ICD-10-PCS | Mod: 26,,, | Performed by: PATHOLOGY

## 2020-02-07 PROCEDURE — 37000009 HC ANESTHESIA EA ADD 15 MINS: Performed by: INTERNAL MEDICINE

## 2020-02-07 PROCEDURE — 43239 PR EGD, FLEX, W/BIOPSY, SGL/MULTI: ICD-10-PCS | Mod: 51,,, | Performed by: INTERNAL MEDICINE

## 2020-02-07 PROCEDURE — 63600175 PHARM REV CODE 636 W HCPCS: Performed by: NURSE ANESTHETIST, CERTIFIED REGISTERED

## 2020-02-07 PROCEDURE — 37000008 HC ANESTHESIA 1ST 15 MINUTES: Performed by: INTERNAL MEDICINE

## 2020-02-07 PROCEDURE — 88305 TISSUE EXAM BY PATHOLOGIST: CPT | Mod: 26,,, | Performed by: PATHOLOGY

## 2020-02-07 PROCEDURE — E9220 PRA ENDO ANESTHESIA: HCPCS | Mod: ,,, | Performed by: NURSE ANESTHETIST, CERTIFIED REGISTERED

## 2020-02-07 PROCEDURE — 81025 URINE PREGNANCY TEST: CPT | Performed by: INTERNAL MEDICINE

## 2020-02-07 PROCEDURE — 43239 EGD BIOPSY SINGLE/MULTIPLE: CPT | Performed by: INTERNAL MEDICINE

## 2020-02-07 PROCEDURE — 45380 PR COLONOSCOPY,BIOPSY: ICD-10-PCS | Mod: ,,, | Performed by: INTERNAL MEDICINE

## 2020-02-07 PROCEDURE — 63600175 PHARM REV CODE 636 W HCPCS: Performed by: INTERNAL MEDICINE

## 2020-02-07 PROCEDURE — 45380 COLONOSCOPY AND BIOPSY: CPT | Mod: ,,, | Performed by: INTERNAL MEDICINE

## 2020-02-07 PROCEDURE — E9220 PRA ENDO ANESTHESIA: ICD-10-PCS | Mod: ,,, | Performed by: NURSE ANESTHETIST, CERTIFIED REGISTERED

## 2020-02-07 PROCEDURE — 43239 EGD BIOPSY SINGLE/MULTIPLE: CPT | Mod: 51,,, | Performed by: INTERNAL MEDICINE

## 2020-02-07 RX ORDER — PROPOFOL 10 MG/ML
VIAL (ML) INTRAVENOUS
Status: DISCONTINUED | OUTPATIENT
Start: 2020-02-07 | End: 2020-02-07

## 2020-02-07 RX ORDER — PROPOFOL 10 MG/ML
VIAL (ML) INTRAVENOUS CONTINUOUS PRN
Status: DISCONTINUED | OUTPATIENT
Start: 2020-02-07 | End: 2020-02-07

## 2020-02-07 RX ORDER — SODIUM CHLORIDE 9 MG/ML
INJECTION, SOLUTION INTRAVENOUS CONTINUOUS
Status: DISCONTINUED | OUTPATIENT
Start: 2020-02-07 | End: 2020-02-07 | Stop reason: HOSPADM

## 2020-02-07 RX ORDER — LIDOCAINE HCL/PF 100 MG/5ML
SYRINGE (ML) INTRAVENOUS
Status: DISCONTINUED | OUTPATIENT
Start: 2020-02-07 | End: 2020-02-07

## 2020-02-07 RX ADMIN — PROPOFOL 200 MCG/KG/MIN: 10 INJECTION, EMULSION INTRAVENOUS at 01:02

## 2020-02-07 RX ADMIN — Medication 100 MG: at 01:02

## 2020-02-07 RX ADMIN — PROPOFOL 100 MG: 10 INJECTION, EMULSION INTRAVENOUS at 01:02

## 2020-02-07 RX ADMIN — SODIUM CHLORIDE: 0.9 INJECTION, SOLUTION INTRAVENOUS at 01:02

## 2020-02-07 RX ADMIN — PROPOFOL 50 MG: 10 INJECTION, EMULSION INTRAVENOUS at 01:02

## 2020-02-07 NOTE — TRANSFER OF CARE
Anesthesia Transfer of Care Note    Patient: Syl Elise    Procedure(s) Performed: Procedure(s) (LRB):  EGD (ESOPHAGOGASTRODUODENOSCOPY) (N/A)  COLONOSCOPY (N/A)    Patient location: PACU    Anesthesia Type: general    Transport from OR: Transported from OR on room air with adequate spontaneous ventilation    Post pain: adequate analgesia    Post assessment: no apparent anesthetic complications    Post vital signs: stable    Level of consciousness: awake    Nausea/Vomiting: no nausea/vomiting    Complications: none    Transfer of care protocol was followed      Last vitals:   Visit Vitals  BP (!) 84/51 (BP Location: Left arm, Patient Position: Lying)   Pulse 77   Temp 36.8 °C (98.2 °F) (Temporal)   Resp 18   Ht 5' (1.524 m)   Wt 52.2 kg (115 lb)   SpO2 100%   Breastfeeding? No   BMI 22.46 kg/m²

## 2020-02-07 NOTE — ANESTHESIA PREPROCEDURE EVALUATION
02/07/2020  Syl Elise is a 34 y.o., female.    Past Medical History:   Diagnosis Date    Abnormal Pap smear of cervix 2004    LEEP done    ADHD (attention deficit hyperactivity disorder)     Hip pain     left Hip xray 2/1/2018 - minimal bilateral hip degeneration - mild osteophytosis within acetabula      Strabismus     Vitamin D deficiency      Patient Active Problem List   Diagnosis    Dysuria    Myalgia of pelvic floor    Urinary urgency    Urinary frequency    History of UTI    Anxiety and depression    Wears contact lenses    Osteopenia of multiple sites    Vitamin D deficiency    ADHD (attention deficit hyperactivity disorder)    Insomnia     Past Surgical History:   Procedure Laterality Date    CERVICAL BIOPSY  W/ LOOP ELECTRODE EXCISION  2005    EYE SURGERY  1986, 1990    strabismus          Anesthesia Evaluation    I have reviewed the Patient Summary Reports.     I have reviewed the Medications.     Review of Systems  Anesthesia Hx:  Denies Family Hx of Anesthesia complications.   Denies Personal Hx of Anesthesia complications.   Hematology/Oncology:  Hematology Normal   Oncology Normal     EENT/Dental:EENT/Dental Normal   Cardiovascular:  Cardiovascular Normal     Pulmonary:  Pulmonary Normal    Renal/:  Renal/ Normal     Hepatic/GI:  Hepatic/GI Normal    Musculoskeletal:  Musculoskeletal Normal    Neurological:  Neurology Normal    Endocrine:  Endocrine Normal    Dermatological:  Skin Normal    Psych:   Psychiatric History          Physical Exam  General:  Well nourished    Airway/Jaw/Neck:  Airway Findings: Mouth Opening: Normal Tongue: Normal  General Airway Assessment: Adult  Mallampati: I  TM Distance: Normal, at least 6 cm  Jaw/Neck Findings:  Neck ROM: Normal ROM  Neck Findings: Normal     Dental:  Dental Findings: In tact   Chest/Lungs:  Chest/Lungs Clear     Heart/Vascular:  Heart Findings: Normal    Abdomen:  Abdomen Findings: Normal      Mental Status:  Mental Status Findings: Normal        Anesthesia Plan  Type of Anesthesia, risks & benefits discussed:  Anesthesia Type:  general  Patient's Preference:   Intra-op Monitoring Plan: standard ASA monitors  Intra-op Monitoring Plan Comments:   Post Op Pain Control Plan:   Post Op Pain Control Plan Comments:   Induction:   IV  Beta Blocker:  Patient is not currently on a Beta-Blocker (No further documentation required).       Informed Consent: Patient understands risks and agrees with Anesthesia plan.  Questions answered. Anesthesia consent signed with patient.  ASA Score: 1     Day of Surgery Review of History & Physical:    H&P update referred to the provider.         Ready For Surgery From Anesthesia Perspective.

## 2020-02-07 NOTE — ANESTHESIA POSTPROCEDURE EVALUATION
Anesthesia Post Evaluation    Patient: Syl Elise    Procedure(s) Performed: Procedure(s) (LRB):  EGD (ESOPHAGOGASTRODUODENOSCOPY) (N/A)  COLONOSCOPY (N/A)    Final Anesthesia Type: general    Patient location during evaluation: GI PACU  Patient participation: Yes- Able to Participate  Level of consciousness: awake and alert  Post-procedure vital signs: reviewed and stable  Pain management: adequate  Airway patency: patent    PONV status at discharge: No PONV  Anesthetic complications: no      Cardiovascular status: stable  Respiratory status: spontaneous ventilation and room air  Hydration status: euvolemic  Follow-up not needed.          Vitals Value Taken Time   /57 2/7/2020  3:04 PM   Temp 36.8 °C (98.2 °F) 2/7/2020  2:42 PM   Pulse 76 2/7/2020  3:04 PM   Resp 18 2/7/2020  3:04 PM   SpO2 100 % 2/7/2020  3:04 PM         Event Time     Out of Recovery 15:15:53          Pain/Maribeth Score: Maribeth Score: 10 (2/7/2020  2:43 PM)

## 2020-02-07 NOTE — DISCHARGE INSTRUCTIONS

## 2020-02-07 NOTE — PROVATION PATIENT INSTRUCTIONS
Discharge Summary/Instructions after an Endoscopic Procedure  Patient Name: Syl Elise  Patient MRN: [id number]  Patient YOB: 1985 Friday, February 07, 2020  Brandin Hiens MD  RESTRICTIONS:  During your procedure today, you received medications for sedation.  These   medications may affect your judgment, balance and coordination.  Therefore,   for 24 hours, you have the following restrictions:   - DO NOT drive a car, operate machinery, make legal/financial decisions,   sign important papers or drink alcohol.    ACTIVITY:  Today: no heavy lifting, straining or running due to procedural   sedation/anesthesia.  The following day: return to full activity including work.  DIET:  Eat and drink normally unless instructed otherwise.     TREATMENT FOR COMMON SIDE EFFECTS:  - Mild abdominal pain, nausea, belching, bloating or excessive gas:  rest,   eat lightly and use a heating pad.  - Sore Throat: treat with throat lozenges and/or gargle with warm salt   water.  - Because air was used during the procedure, expelling large amounts of air   from your rectum or belching is normal.  - If a bowel prep was taken, you may not have a bowel movement for 1-3 days.    This is normal.  SYMPTOMS TO WATCH FOR AND REPORT TO YOUR PHYSICIAN:  1. Abdominal pain or bloating, other than gas cramps.  2. Chest pain.  3. Back pain.  4. Signs of infection such as: chills or fever occurring within 24 hours   after the procedure.  5. Rectal bleeding, which would show as bright red, maroon, or black stools.   (A tablespoon of blood from the rectum is not serious, especially if   hemorrhoids are present.)  6. Vomiting.  7. Weakness or dizziness.  GO DIRECTLY TO THE NEAREST EMERGENCY ROOM IF YOU HAVE ANY OF THE FOLLOWING:      Difficulty breathing              Chills and/or fever over 101 F   Persistent vomiting and/or vomiting blood   Severe abdominal pain   Severe chest pain   Black, tarry stools   Bleeding- more than one  tablespoon   Any other symptom or condition that you feel may need urgent attention  Your doctor recommends these additional instructions:  If any biopsies were taken, your doctors clinic will contact you in 1 to 2   weeks with any results.  - Discharge patient to home.   - Patient has a contact number available for emergencies.  The signs and   symptoms of potential delayed complications were discussed with the   patient.  Return to normal activities tomorrow.  Written discharge   instructions were provided to the patient.   - Resume previous diet.   - Continue present medications.   - Await pathology results.   - Telephone GI clinic for pathology results in 2 weeks.   - Repeat colonoscopy at age 48 for screening purposes.  For questions, problems or results please call your physician - Brandin Hines MD at Work:  [phone].  [facility], EMERGENCY ROOM PHONE NUMBER: [phone]  IF A COMPLICATION OR EMERGENCY SITUATION ARISES AND YOU ARE UNABLE TO REACH   YOUR PHYSICIAN - GO DIRECTLY TO THE EMERGENCY ROOM.  Brandin Hines MD  2/7/2020 2:38:52 PM  This report has been verified and signed electronically.  PROVATION

## 2020-02-07 NOTE — H&P
Short Stay Endoscopy History and Physical    PCP - Caty Miller MD    Procedure - EGD/Colonoscopy  ASA - per anesthesia  Mallampati - per anesthesia  History of Anesthesia problems - no  Family history Anesthesia problems -  no   Plan of anesthesia - MAC    HPI:  This is a 34 y.o. female here for evaluation of :     EGD and Colon - evaluation of abdominal pain, bloating and diarrhea. Symptoms have been ongoing for years. Prior EGD/Colon 10 years ago normal.    Currently feeling well, no abdominal pain, n/v. Notes symptoms are fluctuant, currently been feeling well for past 10 days. No overt signs of bleeding. Occasional NSAID. No fhx of malignancy. No prior abdominal surgeries.    ROS:  Constitutional: No fevers, chills, No weight loss  CV: No chest pain  Pulm: No cough, No shortness of breath  Ophtho: No vision changes  GI: see HPI  Derm: No rash    Medical History:  has a past medical history of Abnormal Pap smear of cervix (2004), ADHD (attention deficit hyperactivity disorder), Hip pain, Strabismus, and Vitamin D deficiency.    Surgical History:  has a past surgical history that includes Cervical biopsy w/ loop electrode excision (2005) and Eye surgery (1986, 1990).    Family History: family history includes Depression in her sister; Diabetes in her maternal aunt and maternal grandmother; Hypertension in her father and mother; Hypothyroidism in her mother; Osteoporosis in her mother; Strabismus in her mother.. Otherwise no colon cancer, inflammatory bowel disease, or GI malignancies.    Social History:  reports that she has never smoked. She has never used smokeless tobacco. She reports that she drinks alcohol. She reports that she does not use drugs.    Review of patient's allergies indicates:   Allergen Reactions    Bactrim [sulfamethoxazole-trimethoprim] Other (See Comments)     Skin tingling, muscle aches/weakness    Sulfa (sulfonamide antibiotics)      Other reaction(s): Other (See Comments)  Skin  numbness       Medications:   Medications Prior to Admission   Medication Sig Dispense Refill Last Dose    cholecalciferol, vitamin D3, (VITAMIN D3) 2,000 unit Cap Take 1 capsule (2,000 Units total) by mouth once daily.   Past Month at Unknown time    dextroamphetamine-amphetamine (ADDERALL) 20 mg tablet 20 mg 3 (three) times daily.    2/7/2020 at Unknown time    escitalopram oxalate (LEXAPRO) 20 MG tablet Take 20 mg by mouth once daily.  0 2/7/2020 at Unknown time    methen-m.blue-s.phos-phsal-hyo (URIBEL) 118-10-40.8-36 mg Cap Take 1 capsule by mouth 4 (four) times daily as needed. 20 capsule 6 Past Month at Unknown time    amitriptyline (ELAVIL) 10 MG tablet 10 mg PO nightly x 7 days.  Add 10 mg every week until symptoms controlled. Max dose 50 mg PO nightly. 60 tablet 11 Unknown at Unknown time    fexofenadine (ALLEGRA) 180 MG tablet Take 180 mg by mouth.   More than a month at Unknown time    FLUCELVAX QUAD 6645-4764 60 mcg (15 mcg x 4)/0.5 mL Susp ADM 0.5ML IM UTD  0 Taking    multivitamin Liqd Take by mouth.   More than a month at Unknown time    ranitidine (ZANTAC 75) 75 MG tablet Take 1 tablet (75 mg total) by mouth 2 (two) times daily. 60 tablet 0 More than a month at Unknown time    sodium,potassium,mag sulfates (SUPREP BOWEL PREP KIT) 17.5-3.13-1.6 gram SolR Take 177 mLs by mouth once daily. for 2 days 1 kit 0        Physical Exam:    Vital Signs:   Vitals:    02/07/20 1329   BP: 107/67   Pulse: 78   Resp: 20   Temp: 98.1 °F (36.7 °C)       General Appearance: Well appearing in no acute distress  Eyes:    No scleral icterus  ENT: Neck supple, Lips, mucosa, and tongue normal; teeth and gums normal  Lungs: CTA anteriorly  Heart:  Regular rate, S1, S2 normal, no murmurs heard.  Abdomen: Soft, non tender, non distended with normal bowel sounds. No hepatosplenomegaly, ascites, or mass.  Extremities: No edema  Skin: No rash    Labs:  Lab Results   Component Value Date    WBC 5.43 07/13/2018    HGB  12.7 07/13/2018    HCT 38.8 07/13/2018     07/13/2018    CHOL 141 07/13/2018    TRIG 25 (L) 07/13/2018    HDL 73 07/13/2018    ALT 17 07/13/2018    AST 21 07/13/2018     07/13/2018    K 3.9 07/13/2018     07/13/2018    CREATININE 0.8 07/13/2018    BUN 12 07/13/2018    CO2 26 07/13/2018    TSH 1.563 07/13/2018       I have explained the risks and benefits of endoscopy procedures to the patient including but not limited to bleeding, perforation, infection, and death.  The patient was asked if they understand and allowed to ask any further questions to their satisfaction.    Mike Moralez MD

## 2020-02-07 NOTE — PROVATION PATIENT INSTRUCTIONS
Discharge Summary/Instructions after an Endoscopic Procedure  Patient Name: Syl Elise  Patient MRN: [id number]  Patient YOB: 1985 Friday, February 07, 2020  Brandin Hnies MD  RESTRICTIONS:  During your procedure today, you received medications for sedation.  These   medications may affect your judgment, balance and coordination.  Therefore,   for 24 hours, you have the following restrictions:   - DO NOT drive a car, operate machinery, make legal/financial decisions,   sign important papers or drink alcohol.    ACTIVITY:  Today: no heavy lifting, straining or running due to procedural   sedation/anesthesia.  The following day: return to full activity including work.  DIET:  Eat and drink normally unless instructed otherwise.     TREATMENT FOR COMMON SIDE EFFECTS:  - Mild abdominal pain, nausea, belching, bloating or excessive gas:  rest,   eat lightly and use a heating pad.  - Sore Throat: treat with throat lozenges and/or gargle with warm salt   water.  - Because air was used during the procedure, expelling large amounts of air   from your rectum or belching is normal.  - If a bowel prep was taken, you may not have a bowel movement for 1-3 days.    This is normal.  SYMPTOMS TO WATCH FOR AND REPORT TO YOUR PHYSICIAN:  1. Abdominal pain or bloating, other than gas cramps.  2. Chest pain.  3. Back pain.  4. Signs of infection such as: chills or fever occurring within 24 hours   after the procedure.  5. Rectal bleeding, which would show as bright red, maroon, or black stools.   (A tablespoon of blood from the rectum is not serious, especially if   hemorrhoids are present.)  6. Vomiting.  7. Weakness or dizziness.  GO DIRECTLY TO THE NEAREST EMERGENCY ROOM IF YOU HAVE ANY OF THE FOLLOWING:      Difficulty breathing              Chills and/or fever over 101 F   Persistent vomiting and/or vomiting blood   Severe abdominal pain   Severe chest pain   Black, tarry stools   Bleeding- more than one  tablespoon   Any other symptom or condition that you feel may need urgent attention  Your doctor recommends these additional instructions:  If any biopsies were taken, your doctors clinic will contact you in 1 to 2   weeks with any results.  - Await pathology results.   - Telephone GI clinic for pathology results in 2 weeks.   - Perform a colonoscopy now.   - See colonoscopy report for further details.  For questions, problems or results please call your physician - Brandin Hines MD at Work:  [phone].  [facility], EMERGENCY ROOM PHONE NUMBER: [phone]  IF A COMPLICATION OR EMERGENCY SITUATION ARISES AND YOU ARE UNABLE TO REACH   YOUR PHYSICIAN - GO DIRECTLY TO THE EMERGENCY ROOM.  Brandin Hines MD  2/7/2020 1:51:45 PM  This report has been verified and signed electronically.  PROVATION

## 2020-02-10 ENCOUNTER — TELEPHONE (OUTPATIENT)
Dept: OPHTHALMOLOGY | Facility: CLINIC | Age: 35
End: 2020-02-10

## 2020-02-10 ENCOUNTER — OFFICE VISIT (OUTPATIENT)
Dept: OBSTETRICS AND GYNECOLOGY | Facility: CLINIC | Age: 35
End: 2020-02-10
Payer: COMMERCIAL

## 2020-02-10 VITALS
BODY MASS INDEX: 21.02 KG/M2 | HEIGHT: 60 IN | DIASTOLIC BLOOD PRESSURE: 63 MMHG | SYSTOLIC BLOOD PRESSURE: 105 MMHG | WEIGHT: 107.06 LBS

## 2020-02-10 DIAGNOSIS — Z01.411 ENCOUNTER FOR WELL WOMAN EXAM WITH ABNORMAL FINDINGS: ICD-10-CM

## 2020-02-10 DIAGNOSIS — Z01.419 WELL WOMAN EXAM WITH ROUTINE GYNECOLOGICAL EXAM: Primary | ICD-10-CM

## 2020-02-10 DIAGNOSIS — N89.8 VAGINAL DISCHARGE: ICD-10-CM

## 2020-02-10 PROCEDURE — 99395 PR PREVENTIVE VISIT,EST,18-39: ICD-10-PCS | Mod: S$GLB,,, | Performed by: OBSTETRICS & GYNECOLOGY

## 2020-02-10 PROCEDURE — 88141 PR  CYTOPATH CERV/VAG INTERPRET: ICD-10-PCS | Mod: ,,, | Performed by: PATHOLOGY

## 2020-02-10 PROCEDURE — 99395 PREV VISIT EST AGE 18-39: CPT | Mod: S$GLB,,, | Performed by: OBSTETRICS & GYNECOLOGY

## 2020-02-10 PROCEDURE — 88141 CYTOPATH C/V INTERPRET: CPT | Mod: ,,, | Performed by: PATHOLOGY

## 2020-02-10 PROCEDURE — 87491 CHLMYD TRACH DNA AMP PROBE: CPT

## 2020-02-10 PROCEDURE — 88175 CYTOPATH C/V AUTO FLUID REDO: CPT | Performed by: PATHOLOGY

## 2020-02-10 PROCEDURE — 87624 HPV HI-RISK TYP POOLED RSLT: CPT

## 2020-02-10 NOTE — PROGRESS NOTES
Subjective:       Patient ID: Syl Elise is a 34 y.o. female.    Chief Complaint:  Annual Exam    History of Present Illness:  HPI  SUBJECTIVE:   34 y.o. female  here for annual. She has history of recurrent UTIs. Recently had UTI and went to  and was treated with macrobid. Final culture showed coagulase neg staph.    She describes her periods as regular, lasting 3-4 days. light flow.  denies break through bleeding.   denies vaginal itching or irritation.  denies vaginal discharge.    She is sexually active.  She uses no method for contraception. She is considering an IUD.    History of abnormal pap: Yes -  LEEP, pap after was normal. Unsure if she completed HPV vaccine.  Last Pap: No result found - ?2017 in [place] per patient normal  Last MMG: NA  Last Colonoscopy: 2020    FH: Denies family history of breast, GYN or colon cancer    GYN & OB History  Patient's last menstrual period was 2020.   Date of Last Pap: No result found    OB History    Para Term  AB Living   0 0 0 0 0 0   SAB TAB Ectopic Multiple Live Births   0 0 0 0 0       Review of Systems  Review of Systems   Constitutional: Negative for chills, diaphoresis, fatigue and fever.   Respiratory: Negative for cough and shortness of breath.    Cardiovascular: Negative for chest pain and palpitations.   Gastrointestinal: Negative for abdominal pain, constipation, diarrhea, nausea and vomiting.   Genitourinary: Positive for frequency and vaginal discharge. Negative for dysmenorrhea, dysuria, menorrhagia, menstrual problem, pelvic pain, vaginal bleeding and vaginal pain.   Musculoskeletal: Negative for back pain and myalgias.   Integumentary:  Negative for rash, acne, breast mass, nipple discharge and breast skin changes.   Neurological: Negative for headaches.   Psychiatric/Behavioral: Negative for depression. The patient is not nervous/anxious.    Breast: Negative for mass, mastodynia, nipple discharge and skin  changes          Objective:    Physical Exam:   Constitutional: She is oriented to person, place, and time. She appears well-developed and well-nourished. No distress.    HENT:   Head: Normocephalic and atraumatic.    Eyes: EOM are normal.    Neck: Normal range of motion. No thyromegaly present.     Pulmonary/Chest: Effort normal. She exhibits no mass and no tenderness. Right breast exhibits no inverted nipple, no mass, no nipple discharge, no skin change, no tenderness and no swelling. Left breast exhibits no inverted nipple, no mass, no nipple discharge, no skin change, no tenderness and no swelling. Breasts are symmetrical.        Abdominal: Soft. She exhibits no distension and no mass. There is no tenderness. There is no rebound and no guarding. No hernia.     Genitourinary:   Genitourinary Comments: Normal external female genitalia; vagina rugated, normal; cervix normal, no masses; uterus small mobile nontender; no adnexal masses palpated; rectal deferred             Musculoskeletal: Normal range of motion and moves all extremeties.       Neurological: She is alert and oriented to person, place, and time.    Skin: Skin is warm. No rash noted.    Psychiatric: She has a normal mood and affect. Her behavior is normal. Judgment and thought content normal.          Assessment:        1. Well woman exam with routine gynecological exam    2. Encounter for well woman exam with abnormal findings    3. Vaginal discharge               Plan:      Syl was seen today for annual exam.    Diagnoses and all orders for this visit:    Well woman exam with routine gynecological exam  - Pap guidelines discussed. Pap/HPV collected. She will find out if she had HPV vaccine.  - MMG age 38.   - Contraception - Contraception options discussed including OCPs, Depo Provera, vaginal ring, hormone patch, Nexplanon, IUD. Patient will let me know which IUD she would like to proceed with.  - STD screening - GCCT collected  - Continue  follow up with UroGyn.    Orders Placed This Encounter   Procedures    HPV High Risk Genotypes, PCR    C. trachomatis/N. gonorrhoeae by AMP DNA       Follow up in about 1 year (around 2/10/2021) for annual.

## 2020-02-11 ENCOUNTER — OFFICE VISIT (OUTPATIENT)
Dept: OPHTHALMOLOGY | Facility: CLINIC | Age: 35
End: 2020-02-11
Payer: COMMERCIAL

## 2020-02-11 ENCOUNTER — PATIENT OUTREACH (OUTPATIENT)
Dept: ADMINISTRATIVE | Facility: OTHER | Age: 35
End: 2020-02-11

## 2020-02-11 DIAGNOSIS — Z98.890 HISTORY OF STRABISMUS SURGERY: ICD-10-CM

## 2020-02-11 DIAGNOSIS — H50.32 ESOTROPIA, INTERMITTENT, ALTERNATING: Primary | ICD-10-CM

## 2020-02-11 LAB
C TRACH DNA SPEC QL NAA+PROBE: NOT DETECTED
N GONORRHOEA DNA SPEC QL NAA+PROBE: NOT DETECTED

## 2020-02-11 PROCEDURE — 92060 SENSORIMOTOR EXAMINATION: CPT | Mod: S$GLB,,, | Performed by: OPHTHALMOLOGY

## 2020-02-11 PROCEDURE — 92002 INTRM OPH EXAM NEW PATIENT: CPT | Mod: S$GLB,,, | Performed by: OPHTHALMOLOGY

## 2020-02-11 PROCEDURE — 99999 PR PBB SHADOW E&M-EST. PATIENT-LVL II: CPT | Mod: PBBFAC,,, | Performed by: OPHTHALMOLOGY

## 2020-02-11 PROCEDURE — 92002 PR EYE EXAM, NEW PATIENT,INTERMED: ICD-10-PCS | Mod: S$GLB,,, | Performed by: OPHTHALMOLOGY

## 2020-02-11 PROCEDURE — 99999 PR PBB SHADOW E&M-EST. PATIENT-LVL II: ICD-10-PCS | Mod: PBBFAC,,, | Performed by: OPHTHALMOLOGY

## 2020-02-11 PROCEDURE — 92060 PR SPECIAL EYE EVAL,SENSORIMOTOR: ICD-10-PCS | Mod: S$GLB,,, | Performed by: OPHTHALMOLOGY

## 2020-02-11 NOTE — PROGRESS NOTES
HPI     33 yo     Hx Strab     S/p Upper Repair OU (1986 with  Srinivasan Farrar)  S/p Outward Repair (1990)        Hx of wearing bifocal for 1.5 years between the 2 surgeries   Hx CL's Wearer OU x 20 years   Hx K Ulcer OU (2002)    Pt states that she is now issues with traction at night , really noticed   that night vision is getting worst.  States that her left eye moves   outward.  She does not notice the drift but others will point it out to   her.      Gtts At's OU PRN  Patching None     Last edited by LEEANN Herron Jr., MD on 2/11/2020 10:22 AM. (History)          ROS     Positive for: Eyes    Last edited by LEEANN Herron Jr., MD on 2/11/2020 10:22 AM. (History)          Assessment /Plan     For exam results, see Encounter Report.    Esotropia, intermittent, alternating    History of strabismus surgery      Educated about ocular findings   The patient has had the desired result of the past strabismus surgical procedure.   Controled E(T) for near, use of bifocal will help when unable to control alignment   Alternating suppression     Correct contact lens power.  Stable strabismus, defer further surgical treatment.      RTC PRN     This service was scribed by Carlita Griffin for, and in the presence of Dr Herron who personally performed this service.    Carlita Griffin, COA    Tal Herron MD

## 2020-02-14 LAB
HPV HR 12 DNA SPEC QL NAA+PROBE: NEGATIVE
HPV16 AG SPEC QL: NEGATIVE
HPV18 DNA SPEC QL NAA+PROBE: NEGATIVE

## 2020-02-28 ENCOUNTER — PATIENT MESSAGE (OUTPATIENT)
Dept: GASTROENTEROLOGY | Facility: CLINIC | Age: 35
End: 2020-02-28

## 2020-03-09 ENCOUNTER — TELEPHONE (OUTPATIENT)
Dept: GASTROENTEROLOGY | Facility: CLINIC | Age: 35
End: 2020-03-09

## 2020-03-09 LAB
FINAL PATHOLOGIC DIAGNOSIS: NORMAL
FINAL PATHOLOGIC DIAGNOSIS: NORMAL
GROSS: NORMAL
Lab: NORMAL

## 2020-03-09 NOTE — TELEPHONE ENCOUNTER
MA contacted pt to give patholgy results per Sofia. Pt did not answer, MA left VM for pt to give the clinic a call.       ----- Message from Sofia Da Silva NP sent at 3/9/2020  3:36 PM CDT -----  Biopsy results normal. No evidence of celiac disease or colitis.

## 2020-03-10 ENCOUNTER — PATIENT MESSAGE (OUTPATIENT)
Dept: OBSTETRICS AND GYNECOLOGY | Facility: CLINIC | Age: 35
End: 2020-03-10

## 2020-03-31 ENCOUNTER — PATIENT MESSAGE (OUTPATIENT)
Dept: GASTROENTEROLOGY | Facility: CLINIC | Age: 35
End: 2020-03-31

## 2020-05-09 ENCOUNTER — PATIENT MESSAGE (OUTPATIENT)
Dept: INTERNAL MEDICINE | Facility: CLINIC | Age: 35
End: 2020-05-09

## 2020-05-09 DIAGNOSIS — Z20.822 EXPOSURE TO COVID-19 VIRUS: Primary | ICD-10-CM

## 2020-05-11 NOTE — TELEPHONE ENCOUNTER
Labs ordered last year were signed in July 2019 and should still be active - please arrange this and the covid antibody test at a time that works for the pt

## 2020-05-20 ENCOUNTER — LAB VISIT (OUTPATIENT)
Dept: LAB | Facility: OTHER | Age: 35
End: 2020-05-20
Attending: INTERNAL MEDICINE
Payer: COMMERCIAL

## 2020-05-20 DIAGNOSIS — Z00.00 ANNUAL PHYSICAL EXAM: ICD-10-CM

## 2020-05-20 DIAGNOSIS — R19.8 CHANGE IN BOWEL MOVEMENT: ICD-10-CM

## 2020-05-20 DIAGNOSIS — R10.13 DYSPEPSIA: ICD-10-CM

## 2020-05-20 DIAGNOSIS — Z20.822 EXPOSURE TO COVID-19 VIRUS: ICD-10-CM

## 2020-05-20 DIAGNOSIS — E55.9 VITAMIN D DEFICIENCY: ICD-10-CM

## 2020-05-20 LAB
25(OH)D3+25(OH)D2 SERPL-MCNC: 50 NG/ML (ref 30–96)
ALBUMIN SERPL BCP-MCNC: 4 G/DL (ref 3.5–5.2)
ALP SERPL-CCNC: 46 U/L (ref 55–135)
ALT SERPL W/O P-5'-P-CCNC: 16 U/L (ref 10–44)
ANION GAP SERPL CALC-SCNC: 6 MMOL/L (ref 8–16)
AST SERPL-CCNC: 18 U/L (ref 10–40)
BASOPHILS # BLD AUTO: 0.05 K/UL (ref 0–0.2)
BASOPHILS NFR BLD: 1 % (ref 0–1.9)
BILIRUB SERPL-MCNC: 0.7 MG/DL (ref 0.1–1)
BUN SERPL-MCNC: 13 MG/DL (ref 6–20)
CALCIUM SERPL-MCNC: 9.1 MG/DL (ref 8.7–10.5)
CHLORIDE SERPL-SCNC: 107 MMOL/L (ref 95–110)
CO2 SERPL-SCNC: 24 MMOL/L (ref 23–29)
CREAT SERPL-MCNC: 0.8 MG/DL (ref 0.5–1.4)
DIFFERENTIAL METHOD: ABNORMAL
EOSINOPHIL # BLD AUTO: 0.1 K/UL (ref 0–0.5)
EOSINOPHIL NFR BLD: 2.9 % (ref 0–8)
ERYTHROCYTE [DISTWIDTH] IN BLOOD BY AUTOMATED COUNT: 11.9 % (ref 11.5–14.5)
EST. GFR  (AFRICAN AMERICAN): >60 ML/MIN/1.73 M^2
EST. GFR  (NON AFRICAN AMERICAN): >60 ML/MIN/1.73 M^2
GLUCOSE SERPL-MCNC: 84 MG/DL (ref 70–110)
HCT VFR BLD AUTO: 41.7 % (ref 37–48.5)
HGB BLD-MCNC: 13.3 G/DL (ref 12–16)
IGA SERPL-MCNC: 82 MG/DL (ref 40–350)
IMM GRANULOCYTES # BLD AUTO: 0.01 K/UL (ref 0–0.04)
IMM GRANULOCYTES NFR BLD AUTO: 0.2 % (ref 0–0.5)
LYMPHOCYTES # BLD AUTO: 1.7 K/UL (ref 1–4.8)
LYMPHOCYTES NFR BLD: 33.8 % (ref 18–48)
MCH RBC QN AUTO: 29.4 PG (ref 27–31)
MCHC RBC AUTO-ENTMCNC: 31.9 G/DL (ref 32–36)
MCV RBC AUTO: 92 FL (ref 82–98)
MONOCYTES # BLD AUTO: 0.3 K/UL (ref 0.3–1)
MONOCYTES NFR BLD: 5.7 % (ref 4–15)
NEUTROPHILS # BLD AUTO: 2.8 K/UL (ref 1.8–7.7)
NEUTROPHILS NFR BLD: 56.4 % (ref 38–73)
NRBC BLD-RTO: 0 /100 WBC
PLATELET # BLD AUTO: 284 K/UL (ref 150–350)
PMV BLD AUTO: 10.7 FL (ref 9.2–12.9)
POTASSIUM SERPL-SCNC: 3.9 MMOL/L (ref 3.5–5.1)
PROT SERPL-MCNC: 7 G/DL (ref 6–8.4)
RBC # BLD AUTO: 4.52 M/UL (ref 4–5.4)
SARS-COV-2 IGG SERPLBLD QL IA.RAPID: NEGATIVE
SODIUM SERPL-SCNC: 137 MMOL/L (ref 136–145)
TSH SERPL DL<=0.005 MIU/L-ACNC: 2.29 UIU/ML (ref 0.4–4)
WBC # BLD AUTO: 4.88 K/UL (ref 3.9–12.7)

## 2020-05-20 PROCEDURE — 82784 ASSAY IGA/IGD/IGG/IGM EACH: CPT

## 2020-05-20 PROCEDURE — 83516 IMMUNOASSAY NONANTIBODY: CPT

## 2020-05-20 PROCEDURE — 80053 COMPREHEN METABOLIC PANEL: CPT

## 2020-05-20 PROCEDURE — 85025 COMPLETE CBC W/AUTO DIFF WBC: CPT

## 2020-05-20 PROCEDURE — 86677 HELICOBACTER PYLORI ANTIBODY: CPT

## 2020-05-20 PROCEDURE — 86769 SARS-COV-2 COVID-19 ANTIBODY: CPT

## 2020-05-20 PROCEDURE — 36415 COLL VENOUS BLD VENIPUNCTURE: CPT

## 2020-05-20 PROCEDURE — 82306 VITAMIN D 25 HYDROXY: CPT

## 2020-05-20 PROCEDURE — 84443 ASSAY THYROID STIM HORMONE: CPT

## 2020-05-25 LAB — H PYLORI IGG SERPL QL IA: NEGATIVE

## 2020-05-27 ENCOUNTER — PATIENT MESSAGE (OUTPATIENT)
Dept: INTERNAL MEDICINE | Facility: CLINIC | Age: 35
End: 2020-05-27

## 2020-05-27 LAB — TTG IGA SER-ACNC: 3 UNITS

## 2020-07-18 ENCOUNTER — NURSE TRIAGE (OUTPATIENT)
Dept: ADMINISTRATIVE | Facility: CLINIC | Age: 35
End: 2020-07-18

## 2020-07-18 ENCOUNTER — PATIENT MESSAGE (OUTPATIENT)
Dept: UROGYNECOLOGY | Facility: CLINIC | Age: 35
End: 2020-07-18

## 2020-07-18 NOTE — TELEPHONE ENCOUNTER
Reason for Disposition   Caller requesting a refill, no triage required, and triager able to refill per unit policy    Protocols used: ST MEDICATION QUESTION CALL-A-MARISSA    Syl is calling from [place], [place], where she is visiting now, and wants a refill of Uribel called in.  Explained to her that she has several refills left on the existing prescription, with [facility].  She said she will call [facility] for this to be refilled.  No triage, no other requests at this time.  Recommend [facility] [place] for attention if Uribel not helpful. She will call back for any new concerns, questions.  Message to Dr Neville to let her know this has been done.

## 2020-07-20 ENCOUNTER — PATIENT MESSAGE (OUTPATIENT)
Dept: UROGYNECOLOGY | Facility: CLINIC | Age: 35
End: 2020-07-20

## 2020-07-22 ENCOUNTER — PATIENT MESSAGE (OUTPATIENT)
Dept: UROGYNECOLOGY | Facility: CLINIC | Age: 35
End: 2020-07-22

## 2020-08-13 ENCOUNTER — PATIENT MESSAGE (OUTPATIENT)
Dept: GASTROENTEROLOGY | Facility: CLINIC | Age: 35
End: 2020-08-13

## 2020-08-14 NOTE — TELEPHONE ENCOUNTER
MA contacted pt to get pt scheduled for an appt. Pt did not answer, MA LVM for pt to give the clinic a call.

## 2020-08-17 ENCOUNTER — PATIENT MESSAGE (OUTPATIENT)
Dept: GASTROENTEROLOGY | Facility: CLINIC | Age: 35
End: 2020-08-17

## 2020-08-18 ENCOUNTER — OFFICE VISIT (OUTPATIENT)
Dept: INTERNAL MEDICINE | Facility: CLINIC | Age: 35
End: 2020-08-18
Attending: INTERNAL MEDICINE
Payer: COMMERCIAL

## 2020-08-18 VITALS
SYSTOLIC BLOOD PRESSURE: 122 MMHG | DIASTOLIC BLOOD PRESSURE: 74 MMHG | BODY MASS INDEX: 21.42 KG/M2 | HEIGHT: 60 IN | WEIGHT: 109.13 LBS | OXYGEN SATURATION: 98 % | HEART RATE: 88 BPM

## 2020-08-18 DIAGNOSIS — E55.9 VITAMIN D DEFICIENCY: ICD-10-CM

## 2020-08-18 DIAGNOSIS — F32.A ANXIETY AND DEPRESSION: ICD-10-CM

## 2020-08-18 DIAGNOSIS — K58.9 IRRITABLE BOWEL SYNDROME, UNSPECIFIED TYPE: ICD-10-CM

## 2020-08-18 DIAGNOSIS — G47.00 INSOMNIA, UNSPECIFIED TYPE: ICD-10-CM

## 2020-08-18 DIAGNOSIS — F90.0 ATTENTION DEFICIT HYPERACTIVITY DISORDER (ADHD), PREDOMINANTLY INATTENTIVE TYPE: ICD-10-CM

## 2020-08-18 DIAGNOSIS — Z00.00 ANNUAL PHYSICAL EXAM: Primary | ICD-10-CM

## 2020-08-18 DIAGNOSIS — F41.9 ANXIETY AND DEPRESSION: ICD-10-CM

## 2020-08-18 DIAGNOSIS — Z11.4 SCREENING FOR HIV (HUMAN IMMUNODEFICIENCY VIRUS): ICD-10-CM

## 2020-08-18 DIAGNOSIS — Z11.59 ENCOUNTER FOR HEPATITIS C SCREENING TEST FOR LOW RISK PATIENT: ICD-10-CM

## 2020-08-18 PROCEDURE — 99395 PREV VISIT EST AGE 18-39: CPT | Mod: S$GLB,,, | Performed by: INTERNAL MEDICINE

## 2020-08-18 PROCEDURE — 99999 PR PBB SHADOW E&M-EST. PATIENT-LVL V: ICD-10-PCS | Mod: PBBFAC,,, | Performed by: INTERNAL MEDICINE

## 2020-08-18 PROCEDURE — 99999 PR PBB SHADOW E&M-EST. PATIENT-LVL V: CPT | Mod: PBBFAC,,, | Performed by: INTERNAL MEDICINE

## 2020-08-18 PROCEDURE — 99395 PR PREVENTIVE VISIT,EST,18-39: ICD-10-PCS | Mod: S$GLB,,, | Performed by: INTERNAL MEDICINE

## 2020-08-18 NOTE — PATIENT INSTRUCTIONS
Call to make an appointment within [facility] for psychiatry/psychology [phone]     Other psychiatrists:   Carlita Bell(psychiatrist) [address] [facility] [address] Phone: [phone]   Martín Capps (psychiatrist) [phone], [phone] [address] [facility]   Dr. Samuel Trinh - [phone]   Dr. Delmy Ramirez - [phone]   Dr. Sandi Núñez - [phone]   Dr. Noam Jacob - [phone]     [facility]: [phone]     Therapy/Psychology:   You can try anyone of these number to see if your insurance is accepted or you would have to call your insurance.     Cognitive Behavioral Therapy (CBT) Center [facility]   Address: [facility] [place] [address]   Phone: [phone]   [email or web address]     [facility] [address], [address]   Phone: [phone]   You can email for an appointment at: [email or web address]     [facility]   [address] [place] [address] [address], [facility], [address]   [email or web address]   Dr. Rosaura Vick, [phone] or [email or web address]   Dr. Vigrie Choudhary, [phone] or [email or web address]     Earlene Small    LCSW (therapist) [phone]   [address] [place] [address]   Dilia Workman   LCSW (therapist) [phone]   [address] [facility]   Debby Logan LCSW (therapist) [phone]   [address] [place] [address]   ABHISHEK Capps         LCSW                    [phone]   Bassam Wright [phone] (therapist) [address] [facility]   Shalom Workman (therapist) [phone]  [address] Allyssa Ratliff (therapist) [phone] [address] [facility]     [facility] [phone]   [facility] TON[address] SandovalAtrisLists of hospitals [place] [address]     Employee Assistance Program (EAP)   Check through your employer's HR.     Online Therapist:     [email or web address]     Free Guided Meditations   [email or web address]    [email or web address]   [email or web address]/pages/[email or web address]

## 2020-08-18 NOTE — PROGRESS NOTES
Subjective:   Patient ID: Syl Elise is a 34 y.o. female  Chief complaint:   Chief Complaint   Patient presents with    Annual Exam       HPI  Pt here for annual exam      Treated for uti while out of town - sx resolved     Pt has hx or recurrent UTI's, urinary frequency/urgency   - followed by urogyn and stable     ADHD:   - Followed by Dr. 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 - has new life stressors with covid and looking for new job but managing      Insomnia:  - sx at this time have improved  - suspects may be due to no longer in high stress job although does have other life stressors while looking for another job    Previously:   - improved since off of work for 3 weeks and attrib to work stress  - started meditation   - sleep improves with this   - present > 10 years   - tried different meds for ADD to see if would affect sleep less   - had sleep study at [facility] 2016 - no sleep apnea   Does not watch TV before bed - is on phone and still working to reduce blue light exposure prior to bedtime   Tried benadryl, melatonin - not effective or caused odd SE  In past,   Took xanax x 2 doses and did not like way it made her feel  - thinks tried trazodone  - given small rx for ambien some time ago by psych and taking sparingly less than 1 time per month  Working to follow good sleep hygiene      Hx of strabismus:   - had corrective surgery for this  - had appt 2/2020 eye exam    Had egd and cscope 2/2020 and f/u appt delayed due to covid   She would like to meet with dietician to discuss low FODMAP diet further in detail   - due to f/u with gi     Due for: tdap  Pap through gyn       Vit d: resumed taking otc vit d - unsure of dose     Review of  Systems    Objective:  Vitals:    08/18/20 1440   BP: 122/74   BP Location: Left arm   Patient Position: Sitting   Pulse: 88   SpO2: 98%   Weight: 49.5 kg (109 lb 2 oz)   Height: 5' (1.524 m)     Body mass index is 21.31 kg/m².    Physical Exam  Vitals signs reviewed.   Constitutional:       Appearance: Normal appearance. She is well-developed.   HENT:      Head: Normocephalic and atraumatic.      Right Ear: Tympanic membrane, ear canal and external ear normal.      Left Ear: Tympanic membrane, ear canal and external ear normal.      Nose: Nose normal. No congestion.      Mouth/Throat:      Mouth: Mucous membranes are moist.      Pharynx: Oropharynx is clear. No oropharyngeal exudate.   Eyes:      Extraocular Movements: Extraocular movements intact.      Conjunctiva/sclera: Conjunctivae normal.   Neck:      Musculoskeletal: Neck supple.      Thyroid: No thyromegaly.   Cardiovascular:      Rate and Rhythm: Normal rate and regular rhythm.      Pulses: Normal pulses.      Heart sounds: Normal heart sounds.   Pulmonary:      Effort: Pulmonary effort is normal.      Breath sounds: Normal breath sounds.   Abdominal:      General: Bowel sounds are normal.      Palpations: Abdomen is soft.   Musculoskeletal:         General: No swelling or tenderness.   Lymphadenopathy:      Cervical: No cervical adenopathy.   Skin:     General: Skin is warm and dry.      Capillary Refill: Capillary refill takes less than 2 seconds.   Neurological:      General: No focal deficit present.      Mental Status: She is alert and oriented to person, place, and time. Mental status is at baseline.   Psychiatric:         Mood and Affect: Mood normal.         Behavior: Behavior normal.         Thought Content: Thought content normal.         Judgment: Judgment normal.         Assessment:  1. Annual physical exam    2. Irritable bowel syndrome, unspecified type    3. Attention deficit hyperactivity disorder (ADHD), predominantly inattentive type    4.  Anxiety and depression    5. Vitamin D deficiency    6. Insomnia, unspecified type    7. Encounter for hepatitis C screening test for low risk patient    8. Screening for HIV (human immunodeficiency virus)        Plan:  Syl was seen today for annual exam.    Diagnoses and all orders for this visit:    Annual physical exam  -     HIV 1/2 Ag/Ab (4th Gen); Future  -     Hepatitis C Antibody; Future    Irritable bowel syndrome, unspecified type  -     Ambulatory referral/consult to Nutrition Services; Future    Attention deficit hyperactivity disorder (ADHD), predominantly inattentive type    Anxiety and depression  -     Ambulatory referral/consult to Psychology; Future    Vitamin D deficiency    Insomnia, unspecified type    Encounter for hepatitis C screening test for low risk patient  -     Hepatitis C Antibody; Future    Screening for HIV (human immunodeficiency virus)  -     HIV 1/2 Ag/Ab (4th Gen); Future    cont current meds   Will refer to psychology and give list of resources   Check appropriate screening labs above   reviewed annual labs drawn in May   Cont current supplements   Flu with specialists     Health Maintenance   Topic Date Due    Hepatitis C Screening  1983    TETANUS VACCINE  11/13/2001    Lipid Panel  Completed

## 2020-08-18 NOTE — TELEPHONE ENCOUNTER
MA contacted pt . Pt accepted virtual on 8/18 for 1:40 . Pt answered yes to all 3 virtual visit questionnaire questions. MA also told pt to complete all pre check questions fully. Pt verbalized all understanding. MA also told pt she will call the day before to reminder her and 15 -20 min before appt to go over rooming questions. Pt verbalized all understanding.

## 2020-08-19 ENCOUNTER — IMMUNIZATION (OUTPATIENT)
Dept: PHARMACY | Facility: CLINIC | Age: 35
End: 2020-08-19
Payer: COMMERCIAL

## 2020-08-25 ENCOUNTER — TELEPHONE (OUTPATIENT)
Dept: GASTROENTEROLOGY | Facility: CLINIC | Age: 35
End: 2020-08-25

## 2020-08-25 ENCOUNTER — OFFICE VISIT (OUTPATIENT)
Dept: GASTROENTEROLOGY | Facility: CLINIC | Age: 35
End: 2020-08-25
Payer: COMMERCIAL

## 2020-08-25 DIAGNOSIS — K52.9 CHRONIC DIARRHEA: Primary | ICD-10-CM

## 2020-08-25 DIAGNOSIS — R10.9 ABDOMINAL PAIN, UNSPECIFIED ABDOMINAL LOCATION: ICD-10-CM

## 2020-08-25 DIAGNOSIS — R14.0 BLOATING: ICD-10-CM

## 2020-08-25 PROCEDURE — 99214 OFFICE O/P EST MOD 30 MIN: CPT | Mod: 95,,, | Performed by: NURSE PRACTITIONER

## 2020-08-25 PROCEDURE — 99214 PR OFFICE/OUTPT VISIT, EST, LEVL IV, 30-39 MIN: ICD-10-PCS | Mod: 95,,, | Performed by: NURSE PRACTITIONER

## 2020-08-25 RX ORDER — NITROFURANTOIN 25; 75 MG/1; MG/1
CAPSULE ORAL
COMMUNITY
Start: 2020-07-19 | End: 2021-04-08

## 2020-08-25 RX ORDER — DICYCLOMINE HYDROCHLORIDE 10 MG/1
10 CAPSULE ORAL
Qty: 90 CAPSULE | Refills: 1 | Status: SHIPPED | OUTPATIENT
Start: 2020-08-25 | End: 2020-10-24

## 2020-08-25 NOTE — TELEPHONE ENCOUNTER
MA contacted pt to get pt scheduled an wild;pt with the dietician for a low fodmap diet. Next available appt was 9/22 . Pt accepted appt for 1 pm . Pt verbalized all understanding and answered yes to all virtual appt scheduling questions.

## 2020-08-25 NOTE — TELEPHONE ENCOUNTER
MA contacted pt to go over rooming questions before virtual at 1:40. Pt did not answer, MA LVM for pt to give the clinic a call .

## 2020-08-25 NOTE — PROGRESS NOTES
[facility] Consultation Note    Reason for Consult:  The primary encounter diagnosis was Chronic diarrhea. Diagnoses of Abdominal pain, unspecified abdominal location and Bloating were also pertinent to this visit.    PCP:   Caty Miller       Referring MD:  No referring provider defined for this encounter.    HPI 12/2019:  This is a 34 y.o. female here for evaluation of abd pain, diarrhea, and bloating . She is a new patient.  Off and on many years. Reports she had an EGD and colonoscopy 10 years ago that were normal. Sensitive stomach since childhood.  She does have some good days/weeks.  If she eats late, fatty foods she may develop a stomach ache. Feels sore for a couple of days.Warm water can help the stomach ache. +Bloating and +gas.  Gets diarrhea maybe every other day for a week then maybe not at all for a couple of days. Feels like this is cyclical. Can be on BM in a day, or it maybe 4 BMs on a bad day. No hematochezia, melena, nausea, vomiting.  No dysphagia, GERD.    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    Interval hx 8/25/20  The patient location is:  Patient Home   The chief complaint leading to consultation is: abd pain, diarrhea and bloating  Visit type: Virtual visit with synchronous audio and video  Total time spent with patient: 35 minutes  Each patient to whom he or she provides medical services by telemedicine is:  (1) informed of the relationship between the physician and patient and the respective role of any other health care provider with respect to management of the patient; and (2) notified that he or she may decline to receive medical services by telemedicine and may withdraw from such care at any time.      Reports her sx are the same. +bloating, gas, stomach ache and some diarrhea. The stomach ache most of the time is not associated the bowel movement  EGD  and colonoscopy 2/2020 normal.  Has considered doing Low Fodmap diet, but has been too difficult to implement independently.     ROS:  Constitutional: No fevers, chills, No weight loss  GI: see HPI      Medical History:  has a past medical history of Abnormal Pap smear of cervix (2004), ADHD (attention deficit hyperactivity disorder), Hip pain, Strabismus, and Vitamin D deficiency.    Surgical History:  has a past surgical history that includes Cervical biopsy w/ loop electrode excision (2005); Eye surgery (1986, 1990); Esophagogastroduodenoscopy (N/A, 2/7/2020); Colonoscopy (N/A, 2/7/2020); and Colon surgery (2020).    Family History: family history includes Arthritis in her father and mother; Depression in her sister; Diabetes in her maternal aunt, maternal aunt, and maternal grandmother; Hypertension in her father and mother; Hypothyroidism in her mother; Osteoporosis in her mother; Strabismus in her mother; Stroke in her maternal grandfather..     Social History:  reports that she has never smoked. She has never used smokeless tobacco. She reports current alcohol use of about 1.0 standard drinks of alcohol per week. She reports that she does not use drugs.    Review of patient's allergies indicates:   Allergen Reactions    Bactrim [sulfamethoxazole-trimethoprim] Other (See Comments)     Skin tingling, muscle aches/weakness    Sulfa (sulfonamide antibiotics)      Other reaction(s): Other (See Comments)  Skin numbness       Current Outpatient Medications on File Prior to Visit   Medication Sig Dispense Refill    dextroamphetamine-amphetamine (ADDERALL) 20 mg tablet 20 mg 3 (three) times daily.       escitalopram oxalate (LEXAPRO) 20 MG tablet Take 20 mg by mouth once daily.  0    cholecalciferol, vitamin D3, (VITAMIN D3) 2,000 unit Cap Take 1 capsule (2,000 Units total) by mouth once daily. (Patient not taking: Reported on 8/18/2020)      methen-m.blue-s.phos-phsal-hyo (URIBEL) 118-10-40.8-36 mg Cap Take 1  capsule by mouth 4 (four) times daily as needed. (Patient not taking: Reported on 8/25/2020) 20 capsule 6    multivitamin Liqd Take by mouth.      nitrofurantoin, macrocrystal-monohydrate, (MACROBID) 100 MG capsule        No current facility-administered medications on file prior to visit.          Objective Findings:    Vital Signs:  There were no vitals taken for this visit.  There is no height or weight on file to calculate BMI.    Physical Exam:  General Appearance: Well appearing in no acute distress  Head:   Normocephalic, without obvious abnormality  Eyes:    No scleral icterus  Skin: No rash  Neurologic: AAO x 3      Labs:  Lab Results   Component Value Date    WBC 4.88 05/20/2020    HGB 13.3 05/20/2020    HCT 41.7 05/20/2020     05/20/2020    CRP 0.3 12/10/2019    CHOL 141 07/13/2018    TRIG 25 (L) 07/13/2018    HDL 73 07/13/2018    ALT 16 05/20/2020    AST 18 05/20/2020     05/20/2020    K 3.9 05/20/2020     05/20/2020    CREATININE 0.8 05/20/2020    BUN 13 05/20/2020    CO2 24 05/20/2020    TSH 2.291 05/20/2020       Imaging reviewed:  None    Endoscopy: reviewed    EGD 2/2020 normal    Colonoscopy 2/2020 Normal. Bx neg for colitis    Reports normal scopes 10 years ago    Assessment:    Ms. Elise is a 34 y.o. WF with:    1. Chronic diarrhea    2. Abdominal pain, unspecified abdominal location    3. Bloating         Sx present since childhood but have recently gotten worse. Celiac, H Pylori and IBD has been ruled out. Symptoms sound functional in nature. She does not qualify as a dx of IBS-D since the abdominal pain is nt associated with defecation or stool frequency or appearance. However, I discussed with patient that our treatment options will center around IBS-D treatment options    Recommendations:  1. Dietician to discuss Low Fodmap diet  2. Take bentyl tid PRN for abd pain   3. Lactulose breath test    F/u PRN if sx worsen or change    Order summary:  Orders Placed This  Encounter    Lactulose Challenge Test    dicyclomine (BENTYL) 10 MG capsule     25 MINUTES TOTAL FACE TO FACE >50% SPENT IN COUNSELING AND COORDINATION OF CARE . Today we discussed her normal scope and lab work, SIBO, functional GI disorders      Thank you so much for allowing me to participate in the care of Syl Da Silva, FNP-C

## 2020-08-25 NOTE — TELEPHONE ENCOUNTER
Pt gave the clinic a call back , MA went over all rooming questions with pt . Pt verbalized all understanding.

## 2020-09-03 ENCOUNTER — TELEPHONE (OUTPATIENT)
Dept: OBSTETRICS AND GYNECOLOGY | Facility: CLINIC | Age: 35
End: 2020-09-03

## 2020-09-08 ENCOUNTER — TELEPHONE (OUTPATIENT)
Dept: GASTROENTEROLOGY | Facility: CLINIC | Age: 35
End: 2020-09-08

## 2020-09-08 ENCOUNTER — PATIENT MESSAGE (OUTPATIENT)
Dept: GASTROENTEROLOGY | Facility: CLINIC | Age: 35
End: 2020-09-08

## 2020-09-08 NOTE — TELEPHONE ENCOUNTER
MA contacted pt. Pt was scheduled for Lactulose test for 9/16 at 8:30 . MA will mail out instructions to follow for test. Pt verbalized all understanding that lactulose test will be done at the main campus on the 2nd floor. Pt verbalized all understanding.         ----- Message from Magaly Guadarrama MA sent at 9/8/2020  3:16 PM CDT -----  Contact: [phone]  ret a call to Adela marcum: setting up  lactulose test    [phone]

## 2020-09-08 NOTE — TELEPHONE ENCOUNTER
MA contacted pt back to get her scheduled for lactulose test. Pt  Did not answer, MA LVM to give the clinic a call.

## 2020-09-16 ENCOUNTER — TELEPHONE (OUTPATIENT)
Dept: GASTROENTEROLOGY | Facility: CLINIC | Age: 35
End: 2020-09-16

## 2020-09-16 ENCOUNTER — PATIENT MESSAGE (OUTPATIENT)
Dept: GASTROENTEROLOGY | Facility: CLINIC | Age: 35
End: 2020-09-16

## 2020-09-16 ENCOUNTER — LAB VISIT (OUTPATIENT)
Dept: LAB | Facility: HOSPITAL | Age: 35
End: 2020-09-16
Payer: COMMERCIAL

## 2020-09-16 DIAGNOSIS — R14.0 BLOATING: ICD-10-CM

## 2020-09-16 DIAGNOSIS — R10.9 ABDOMINAL PAIN, UNSPECIFIED ABDOMINAL LOCATION: ICD-10-CM

## 2020-09-16 DIAGNOSIS — K52.9 CHRONIC DIARRHEA: ICD-10-CM

## 2020-09-16 LAB
BREATH H2 1.5H P LAC: 16 PPM
BREATH H2 1H P LAC: 3 PPM
BREATH H2 30M P LAC: 4 PPM
BREATH H2 P 10 G LACTULOSE PO: 5 PPM
BREATH H2 P 10 G LACTULOSE PO: 7 PPM
BREATH H2 PRE LAC: 10 PPM
LACTULOSE BT COL PERIOD: NORMAL HR
LACTULOSE BT INTERPRETATION: NORMAL

## 2020-09-16 PROCEDURE — 91065 BREATH HYDROGEN/METHANE TEST: CPT

## 2020-09-16 NOTE — PROGRESS NOTES
Breath test was negative for SIBO. She can still try the xifaxan if she'd like to see if this helps the diarrhea

## 2020-09-16 NOTE — TELEPHONE ENCOUNTER
MA contacted pt to give results per Sofia . Pt VM was full .        ----- Message from Sofia Da Silva NP sent at 9/16/2020 12:07 PM CDT -----  Breath test was negative for SIBO. She can still try the xifaxan if she'd like to see if this helps the diarrhea

## 2020-09-22 ENCOUNTER — PATIENT MESSAGE (OUTPATIENT)
Dept: GASTROENTEROLOGY | Facility: CLINIC | Age: 35
End: 2020-09-22

## 2020-10-06 ENCOUNTER — CLINICAL SUPPORT (OUTPATIENT)
Dept: GASTROENTEROLOGY | Facility: CLINIC | Age: 35
End: 2020-10-06
Payer: COMMERCIAL

## 2020-10-06 DIAGNOSIS — K52.9 CHRONIC DIARRHEA: Primary | ICD-10-CM

## 2020-10-06 DIAGNOSIS — R14.0 BLOATING: ICD-10-CM

## 2020-10-06 PROCEDURE — 99499 NO LOS: ICD-10-PCS | Mod: 95,,, | Performed by: DIETITIAN, REGISTERED

## 2020-10-06 PROCEDURE — 99499 UNLISTED E&M SERVICE: CPT | Mod: 95,,, | Performed by: DIETITIAN, REGISTERED

## 2020-10-06 NOTE — PROGRESS NOTES
Virtual Visit   The patient location is: home   The chief complaint leading to consultation is: bloating and interest in Low Fodmap diet s/p SIBO testing   Visit type: Virtual visit with synchronous audio and video  Total time spent with patient: 60 minutes   Each patient to whom he or she provides medical services by telemedicine is:  (1) informed of the relationship between the physician and patient and the respective role of any other health care provider with respect to management of the patient; and (2) notified that he or she may decline to receive medical services by telemedicine and may withdraw from such care at any time.    Notes: Has researched [facility] information but found it complicated to follow     GI NUTRITIONAL ASSESSMENT  Referring Provider: Sofia Da Silva NP    Syl Elise is a 35 y.o. female referred regarding the following problems:  Reason for Visit: Nutrition assessment and recommendations related to:   Chief Complaint   Patient presents with    Bloated-SIBO negative; Low Fodmap with Similase improves     Taking Digestive Enzymes [employer] -Similase     Irritable Bowel Syndrome/diarrhea     Notes anxiety exacerbates symptoms     ADHD     Adderall and Lexapro    Studies   · EGD 2/2020 normal  · Colonoscopy 2/2020 Normal. Bx neg for colitis    Symptoms:   Bloating exacerbated by high FODMAP foods ( sourdough bread, sugars)   Patient Nutrition Goals :   Improvement of abdominal pain with better food choices   Improvement in bloating with better food choices     Medical/Surgical History  Pertinent Social History:  Social History     Tobacco Use    Smoking status: Never Smoker    Smokeless tobacco: Never Used   Substance Use Topics    Alcohol use: Yes     Alcohol/week: 1.0 standard drinks     Types: 1 Glasses of wine per week     Frequency: 2-4 times a month     Drinks per session: 1 or 2     Binge frequency: Never     Comment: 1 drink a week sometimes -  socially    Drug use: Never     Types: Marijuana        Previous Medical History:  Past Medical History:   Diagnosis Date    Abnormal Pap smear of cervix 2004    LEEP done    ADHD (attention deficit hyperactivity disorder)     Hip pain     left Hip xray 2/1/2018 - minimal bilateral hip degeneration - mild osteophytosis within acetabula      Strabismus     Vitamin D deficiency        Previous Surgical History:  Past Surgical History:   Procedure Laterality Date    CERVICAL BIOPSY  W/ LOOP ELECTRODE EXCISION  2005    COLON SURGERY  2020    colonoscopy and endoscopy    COLONOSCOPY N/A 2/7/2020    Procedure: COLONOSCOPY;  Surgeon: Brandin Hines MD;  Location: [facility] ([address] [place] [address]);  Service: Endoscopy;  Laterality: N/A;    ESOPHAGOGASTRODUODENOSCOPY N/A 2/7/2020    Procedure: EGD (ESOPHAGOGASTRODUODENOSCOPY);  Surgeon: Brandin Hines MD;  Location: [facility] ([address] [place] [address]);  Service: Endoscopy;  Laterality: N/A;  okay for rice or smith    EYE SURGERY  1986, 1990    strabismus       Anthropometric Data Usual Weight:   has increased 4 pounds over last year  Estimated body mass index is 21.01 kg/m² as calculated from the following:    Height as of 12/21/20: 5' (1.524 m).    Weight as of 12/21/20: 48.8 kg (107 lb 9.4 oz).  Weight History:  Wt Readings from Last 12 Encounters:   12/21/20 48.8 kg (107 lb 9.4 oz)   10/28/20 48.5 kg (106 lb 14.8 oz)   10/18/20 50.8 kg (112 lb)   10/15/20 50.5 kg (111 lb 5.3 oz)   08/18/20 49.5 kg (109 lb 2 oz)   02/10/20 48.5 kg (107 lb 0.5 oz)   02/07/20 52.2 kg (115 lb)   01/19/20 49.9 kg (110 lb)   12/10/19 49.9 kg (110 lb 0.2 oz)   07/19/19 47.9 kg (105 lb 9.6 oz)   06/11/19 48.1 kg (106 lb)   12/21/18 47.1 kg (103 lb 13.4 oz)   ]  Calculated calorie needs : 1400 calories ( msj x 1.2)   Calculated Protein needs : 4050 grams  grams (.8 -1 gram/kg)   Nutrition Focused Physical Findings:   Unable to assess via video    Meds  and Biochemical Data    Labs  @BRIEFLAB(GLU,K,PHOS,MG,CHOL,HDL,LDL,TRIG,ALBUMIN,PREALBUMIN,AMMONIA,HGBA1C,CALCIUM)  Lab Results   Component Value Date    WBC 4.88 05/20/2020    HGB 13.3 05/20/2020    HCT 41.7 05/20/2020    MCV 92 05/20/2020     05/20/2020     No results found for: FERRITIN  Lab Results   Component Value Date     05/20/2020    K 3.9 05/20/2020     05/20/2020    CO2 24 05/20/2020    GLU 84 05/20/2020    BUN 13 05/20/2020    CREATININE 0.8 05/20/2020    CALCIUM 9.1 05/20/2020    PROT 7.0 05/20/2020    ALBUMIN 4.0 05/20/2020    BILITOT 0.7 05/20/2020    ALKPHOS 46 (L) 05/20/2020    AST 18 05/20/2020    ALT 16 05/20/2020     Lab Results   Component Value Date    TSH 2.291 05/20/2020     Lab Results   Component Value Date    CRP 0.3 12/10/2019     No results found for: PHOS  No results found for: PREALBUMIN  Cholesterol   Date Value Ref Range Status   07/13/2018 141 120 - 199 mg/dL Final     Comment:     The National Cholesterol Education Program (NCEP) has set the  following guidelines (reference ranges) for Cholesterol:  Optimal.....................<200 mg/dL  Borderline High.............200-239 mg/dL  High........................> or = 240 mg/dL       No results found for: IRON  No results found for: FOLATE  No components found for: WUHUGWSJ87  No components found for: PIGOBZUQ40  No components found for: VITAMIND2  No components found for: VITAMIND    No results found for: FERRITIN  Lab Results   Component Value Date    CRP 0.3 12/10/2019     No results found for: SEDRATE  Assessment of Lab Values:   Medication:  Current Outpatient Medications   Medication Sig    amoxicillin-clavulanate 875-125mg (AUGMENTIN) 875-125 mg per tablet Take 1 tablet by mouth every 12 (twelve) hours.    cholecalciferol, vitamin D3, (VITAMIN D3) 2,000 unit Cap Take 1 capsule (2,000 Units total) by mouth once daily.    dextroamphetamine-amphetamine (ADDERALL) 20 mg tablet 20 mg 3 (three) times daily.     escitalopram oxalate  (LEXAPRO) 20 MG tablet Take 20 mg by mouth once daily.    toniaBaljinderrylee.blue-sEnriquephos-phsal-hyo (URIBEL) 118-10-40.8-36 mg Cap Take 1 capsule by mouth 4 (four) times daily as needed. (Patient not taking: Reported on 10/28/2020)    multivitamin Liqd Take by mouth.    nitrofurantoin, macrocrystal-monohydrate, (MACROBID) 100 MG capsule     prenatal vit 87-iron-folic-dha (PRENATE MINI, FERR ASP GLYCIN,) 18-1-350 mg Cap Take 1 capsule by mouth once daily.     No current facility-administered medications for this visit.    Vitamin/Supplements/Herbs: [employer]- Similase ( digestive enzymes)   Potential Food drug Interaction:   Allergies:  Review of patient's allergies indicates:   Allergen Reactions    Bactrim [sulfamethoxazole-trimethoprim] Other (See Comments)     Skin tingling, muscle aches/weakness    Sulfa (sulfonamide antibiotics)      Other reaction(s): Other (See Comments)  Skin numbness      Dietary Data  Current Diet: skips meals often due to work ; notes when she consumes packaged snacks - crackers, chips) bloating is worse .   Diet Recall: Breakfast; Lactose free yogurt /rice ; Lunch: Tofu /occasional red meat /green beans / no raw vegetables   Meal patterns: 3 meals daily  Fluid Intake /quantity: Water  Vegetables: cooking ; blanching ; using sauteed oil that onions have been cooked in give flavor without fodmaps   Meal preparation/shopping: self   Dining out: rare  Foods poorly tolerated : processed starches, highly seasoned foods ( ie Tacos)   Milk tolerance: avoids  - even cheese triggers   Fiber tolerance : cooked always better   Nuts/seeds: tolerates popcorn without problem   Fat tolerance : ghee used in small amounts   Sugars and desserts : avoids due to noted bloating   Review of patient's allergies indicates:   Allergen Reactions    Bactrim [sulfamethoxazole-trimethoprim] Other (See Comments)     Skin tingling, muscle aches/weakness    Sulfa (sulfonamide antibiotics)      Other  reaction(s): Other (See Comments)  Skin numbness      Nutrition Diagnosis:   Altered GI Function related to negative work up for SIBO but longstanding diarrhea responsive to low Fodmap diet , digestive enzyme use.     Goals/Recommendations: Provided information from Patricia Mayers on cooking techniques, grocery lists, etc to improve digestibility of foods low in fodmaps .   Education Provided by email above   Patient and/or family comprehend instructions: yes  Outcome: Verbalizes understanding  Monitoring: diary of responses to foods ; meal prep techniques, effectiveness of digestive enzymes   Follow-up: 1 month   Counseling Time: 60 minutes

## 2020-10-13 ENCOUNTER — PATIENT OUTREACH (OUTPATIENT)
Dept: ADMINISTRATIVE | Facility: OTHER | Age: 35
End: 2020-10-13

## 2020-10-15 ENCOUNTER — OFFICE VISIT (OUTPATIENT)
Dept: UROGYNECOLOGY | Facility: CLINIC | Age: 35
End: 2020-10-15
Payer: COMMERCIAL

## 2020-10-15 VITALS
SYSTOLIC BLOOD PRESSURE: 117 MMHG | BODY MASS INDEX: 21.85 KG/M2 | DIASTOLIC BLOOD PRESSURE: 78 MMHG | WEIGHT: 111.31 LBS | HEIGHT: 60 IN

## 2020-10-15 DIAGNOSIS — R10.2 PELVIC PAIN IN FEMALE: ICD-10-CM

## 2020-10-15 DIAGNOSIS — Z87.440 HISTORY OF UTI: ICD-10-CM

## 2020-10-15 DIAGNOSIS — R39.89 BLADDER PAIN: Primary | ICD-10-CM

## 2020-10-15 DIAGNOSIS — R35.0 URINARY FREQUENCY: ICD-10-CM

## 2020-10-15 DIAGNOSIS — F32.A ANXIETY AND DEPRESSION: ICD-10-CM

## 2020-10-15 DIAGNOSIS — F41.9 ANXIETY AND DEPRESSION: ICD-10-CM

## 2020-10-15 DIAGNOSIS — M79.18 MYALGIA OF PELVIC FLOOR: ICD-10-CM

## 2020-10-15 DIAGNOSIS — R39.15 URINARY URGENCY: ICD-10-CM

## 2020-10-15 PROCEDURE — 87186 SC STD MICRODIL/AGAR DIL: CPT

## 2020-10-15 PROCEDURE — 87077 CULTURE AEROBIC IDENTIFY: CPT

## 2020-10-15 PROCEDURE — 99213 OFFICE O/P EST LOW 20 MIN: CPT | Mod: S$GLB,,, | Performed by: OBSTETRICS & GYNECOLOGY

## 2020-10-15 PROCEDURE — 87086 URINE CULTURE/COLONY COUNT: CPT

## 2020-10-15 PROCEDURE — 99213 PR OFFICE/OUTPT VISIT, EST, LEVL III, 20-29 MIN: ICD-10-PCS | Mod: S$GLB,,, | Performed by: OBSTETRICS & GYNECOLOGY

## 2020-10-15 PROCEDURE — 3008F PR BODY MASS INDEX (BMI) DOCUMENTED: ICD-10-PCS | Mod: CPTII,S$GLB,, | Performed by: OBSTETRICS & GYNECOLOGY

## 2020-10-15 PROCEDURE — 3008F BODY MASS INDEX DOCD: CPT | Mod: CPTII,S$GLB,, | Performed by: OBSTETRICS & GYNECOLOGY

## 2020-10-15 PROCEDURE — 99999 PR PBB SHADOW E&M-EST. PATIENT-LVL IV: CPT | Mod: PBBFAC,,, | Performed by: OBSTETRICS & GYNECOLOGY

## 2020-10-15 PROCEDURE — 99999 PR PBB SHADOW E&M-EST. PATIENT-LVL IV: ICD-10-PCS | Mod: PBBFAC,,, | Performed by: OBSTETRICS & GYNECOLOGY

## 2020-10-15 PROCEDURE — 87088 URINE BACTERIA CULTURE: CPT

## 2020-10-15 NOTE — PROGRESS NOTES
[facility] UROGYNECOLOGY-[id number]   Urogyn follow up  10/18/2020    [facility] UROGYNECOLOGY-[id number]   [address] [id number]    Syl Elise  [id number]  1985      Syl Elise is a 34 y.o.  here for a urogyn follow up.    1)  Reported frequent UTIs:  Per GYN note 5/14/18:  complaints of recurrent UTI's since November. She has had a UTI in Late November 2017 (UC in [place]) and treated with Macrobid--not clear if C&S sent, then had another one in mid April for which she was seen at an urgent care clinic--doesn't sound like C&S sent, and treated with Bactrim. After about 3 days, she stopped the Bactrim due to muscle aches and feeling weird. She was then switched to Keflex for the completion of the treatment.  Was then seen by UC for persistent UTI symptoms, but she was told she didn't have UTI per urine dip.  Was Rx'd pyridium--helped symptoms, except for fullness.  She was given a prescription for macrobid by her friend's mother.   5/14/18 urine C&S NEG. 5/14/18 chlam/caleb NEG.   --typical symptoms:  Burning at end of urination x 1 day, then increased U/F/bladder aching and fullness, urine malodor; no blood in urine  --no previous h/o UTIs   --no symptoms today  --inciting factors: intercourse; 2nd UTI was day before menses; happened after not sleeping well x 2 nights (has some chronic insomnia)  --has been trying to increase hydration, emptying bladder before/after intercourse  --anxiety/depression:  Has some baseline stress.  Feels that lexapro is helping with depression.  Thinks she needs to see a therapist.    --no baseline constipation/diarrhea; can have some stomach upset, laurel after ABX  --no UI; did have a habit of holding bladder for long periods--trying to void more frequently    Past Medical History  Past Medical History:   Diagnosis Date    Abnormal Pap smear of cervix 2004    LEEP done    ADHD (attention deficit hyperactivity disorder)     Anxiety      Depression     Hip pain     left Hip xray 2018 - minimal bilateral hip degeneration - mild osteophytosis within acetabula      CROW (obstructive sleep apnea) - mild 2016    sleep study from [facility] re-eval in clinic with further mgmt including ENT eval, conservative mgmt, or PAP therapy    Osteopenia     2016 on bone density - left femur T score -1.9    Strabismus     Vitamin D deficiency       Past Surgical History  Past Surgical History:   Procedure Laterality Date    CERVICAL BIOPSY  W/ LOOP ELECTRODE EXCISION      EYE SURGERY  ,     strabismus       Hysterectomy: No    Past Ob History      Gynecologic History  LMP: No LMP recorded.  Age of menarche: 10 yo  Age of menopause: NA  Menstrual history: monthly; has some dysmenorrhea x 1st 2 days, ibuprofen relieves; does have some MS  Pap test: 2020 normal/+BV; HPV neg.  History of abnormal paps: Yes - s/p LEEP ().  History of STIs:  No  Mammogram: none  Colonoscopy: Date of last:  (for stomach upset + EGD).  Result:  Normal per report.  Repeat due:  44 yo.    DEXA: none    Issues include:  Patient Active Problem List   Diagnosis    Dysuria    Myalgia of pelvic floor    Urinary urgency    Urinary frequency    History of UTI    Anxiety and depression    Wears contact lenses    Osteopenia of multiple sites    Vitamin D deficiency    ADHD (attention deficit hyperactivity disorder)    Insomnia    Diarrhea    Esotropia, intermittent, alternating    History of strabismus surgery    Bladder pain    Pelvic pain in female     Last visit:    1)  Frequent UTIs (bladder infections):  --no major symptoms today  --urine C&S ,  NEG; UA  neg  --thought she had UTI in --saw [facility] clinic but weren't able to send C&S; so, continued to take uribel and hydrate well, which resolved things after 3 weeks.   --ureaplasma/myoplasma weren't sent correctly; took azith empirically  --feels like things are  improved  --not avoiding dietary irritants  --notes increase in flares with stress; working on meditation; has not contacted therapist  --has had some mild, intermittent discomfort during intercourse; does not have to stop:  finished pelvic floor PT (Francisca), which helped--has not been practicing  --has been trying to do meditation; has not called therapist yet  --did not try elavil yet  --has new sex partner -- some mild irritation post-coitally  --did not try prelief  FOR FLARES:  Has tried uribel--does help. Has not had to use recently.    2) mild cramping mid cycle:  --no bleeding  --sounds like Mitteschmerz    3) STI screen 12/18: negative    TODAY:    1)  Frequent UTIs (bladder infections):  --urine C&S 5/18, 6/18 NEG; UA 9/18 neg  --urine C&S 1/2020 GBS (treated due to sx);  7/19/2020 (confirmed UTI--no C&S only UA with +LE/neg nit)--took macrobid  --has had increased urgency/hesitancy, straining, can't relax pelvic floor x 1 year but escalating--tried uribel--doesn't always help and not getting as much relief  --yesterday started having lower abd cramping and some blood on wiping and toilet; pain is better today but still present  --has started working on stress/anxiety again with therapy  --is having pain with intercourse (she had this initially but resolved with PT/Francisca)--same partner; worse with deep penetration   --has not been practicing PT exercises regularly; tries to do deep breathing not clench--sounds like she's having trouble with muscle control related to voiding    2) mild cramping mid cycle:  --no bleeding  --sounds like Mitteschmerz    3)  STI screen 12/18: negative.  Chlam/caleb 2/2020 NEG.     Medications:    Current Outpatient Medications:     cholecalciferol, vitamin D3, (VITAMIN D3) 2,000 unit Cap, Take 1 capsule (2,000 Units total) by mouth once daily., Disp: , Rfl:     dextroamphetamine-amphetamine (ADDERALL) 20 mg tablet, 20 mg 3 (three) times daily. , Disp: , Rfl:     dicyclomine  (BENTYL) 10 MG capsule, Take 1 capsule (10 mg total) by mouth before meals as needed., Disp: 90 capsule, Rfl: 1    escitalopram oxalate (LEXAPRO) 20 MG tablet, Take 20 mg by mouth once daily., Disp: , Rfl: 0    methen-m.blue-s.phos-phsal-hyo (URIBEL) 118-10-40.8-36 mg Cap, Take 1 capsule by mouth 4 (four) times daily as needed., Disp: 20 capsule, Rfl: 6    multivitamin Liqd, Take by mouth., Disp: , Rfl:     nitrofurantoin, macrocrystal-monohydrate, (MACROBID) 100 MG capsule, , Disp: , Rfl:     nitrofurantoin, macrocrystal-monohydrate, (MACROBID) 100 MG capsule, Take 1 capsule (100 mg total) by mouth 2 (two) times daily. for 5 days, Disp: 10 capsule, Rfl: 0    ROS:  As per HPI.      Exam  /78 (BP Location: Left arm, Patient Position: Sitting, BP Method: Medium (Automatic))   Ht 5' (1.524 m)   Wt 50.5 kg (111 lb 5.3 oz)   BMI 21.74 kg/m²   General: alert and oriented, no acute distress  Remainder of PE deferred    Impression  1. Bladder pain  Urine culture    Simple Urodynamics w/ Cysto    Cystourethroscopy-Future    US Retroperitoneal Complete   2. Pelvic pain in female  US Pelvis Comp with Transvag NON-OB (xpd   3. History of UTI  US Retroperitoneal Complete   4. Myalgia of pelvic floor     5. Urinary urgency     6. Urinary frequency     7. Anxiety and depression       We reviewed the above issues and discussed options for short-term versus long-term management of her problems.   Plan:   1)  Frequent UTIs (bladder infections):  --urine C&S  --s/p empirical treatment for ureaplasma/myoplasma  --If you feel like you have a UTI, please call our office so that we can place an order for you to drop off a urine specimen at the closest [facility] lab (we will arrange).     --We will call in antibiotics for you to start right after you drop off specimen.     --In this way, we can determine:     1)  Do you have a UTI?      2) If you have a UTI, is it sensitive to the antibiotics we prescribed?   --concern for  levator/pelvic floor muscle tension:   --start amitriptyline: 10 mg PO nightly x 7 days.  Can add 10 mg every week until symptoms controlled. Max dose 50 mg PO nightly.   FOR FLARES:  Can take uribel up to 4x/day x 5 days.  If persists past that, call us.    --continue talk therapy   --restart pelvic floor PT    [facility] (all take Medicaid):  1)  TAYLOR Monroy (Earlene Niño Nickie or Carisa Koch): (p) [phone].  Or [phone].   2)  TAYLOR Campbell. (p) [phone].      --follow UTI prevention tips (see attached)  --control bowel movements/fecal cross-contamination  --empty bladder before and after intercourse  --start taking 1 probiotic pill (any with lactobacillus and/or acidophilus) daily  --schedule renal + pelvic ultrasound and cystoscopy   --examine levators/pelvic floor at muscle    If Irritative voiding symptoms:  --Empty bladder every 3 hours.  Empty well: wait a minute, lean forward on toilet.    --Avoid dietary irritants (see sheet).  Keep diary x 3-5 days to determine your irritants.  --can try prelief with meals, neutralizes acid, if having bad day or consuming a trigger  --continue pelvic floor PT with Francisca  --URGE: start starting amitriptyline: 10 mg PO nightly x 7 days.  Can add 10 mg every week until symptoms controlled. Max dose 50 mg PO nightly. consider medication daily (anticholinergic).    --control stress/anxiety, cognitive behavioral therapy; let psych know that stress level has increased    3) STI screening:  --12/18 STI screen normal  --2/2020 chlam/caleb negative    4) cramping mid-cycle:  --seems likely Mittelschmerz (ovulatory pain)  --consider ibuprofen or Aleve as needed  --see handout  --let us know if worsens    5) RTC for cystoscopy.        **Addendum:  Urine C&S + e coli.  Patient seen at  10/18/2020 and Rx for macrobid given.  Patient messaged to get more information/re-explain situation.  Will add renal US.  Staff messaged to help do so.     30 minutes were spent  in face to face time with this patient  100 % of this time was spent in counseling and/or coordination of care     Romulo Neville MD  [facility]  Division of Female Pelvic Medicine and Reconstructive Surgery  Department of Obstetrics & Gynecology

## 2020-10-15 NOTE — PATIENT INSTRUCTIONS
1)  Frequent UTIs (bladder infections):  --urine C&S  --s/p empirical treatment for ureaplasma/myoplasma  --If you feel like you have a UTI, please call our office so that we can place an order for you to drop off a urine specimen at the closest [facility] lab (we will arrange).     --We will call in antibiotics for you to start right after you drop off specimen.     --In this way, we can determine:     1)  Do you have a UTI?      2) If you have a UTI, is it sensitive to the antibiotics we prescribed?   --concern for levator/pelvic floor muscle tension:   --start amitriptyline: 10 mg PO nightly x 7 days.  Can add 10 mg every week until symptoms controlled. Max dose 50 mg PO nightly.   FOR FLARES:  Can take uribel up to 4x/day x 5 days.  If persists past that, call us.    --continue talk therapy   --restart pelvic floor PT    [facility] (all take Medicaid):  1)  TAYLOR Monroy (Earlene Fu or Carisa Koch): (p) [phone].  Or [phone].   2)  TAYLOR Campbell. (p) [phone].      --follow UTI prevention tips (see attached)  --control bowel movements/fecal cross-contamination  --empty bladder before and after intercourse  --start taking 1 probiotic pill (any with lactobacillus and/or acidophilus) daily  --schedule pelvic ultrasound and cystoscopy   --examine levators/pelvic floor at muscle    If Irritative voiding symptoms:  --Empty bladder every 3 hours.  Empty well: wait a minute, lean forward on toilet.    --Avoid dietary irritants (see sheet).  Keep diary x 3-5 days to determine your irritants.  --can try prelief with meals, neutralizes acid, if having bad day or consuming a trigger  --continue pelvic floor PT with Francisca  --URGE: start starting amitriptyline: 10 mg PO nightly x 7 days.  Can add 10 mg every week until symptoms controlled. Max dose 50 mg PO nightly. consider medication daily (anticholinergic).    --control stress/anxiety, cognitive behavioral therapy; let psych know that stress level  has increased    3) STI screening:  --12/18 STI screen normal  --2/2020 chlam/caleb negative    4) cramping mid-cycle:  --seems likely Ainsley (ovulatory pain)  --consider ibuprofen or Aleve as needed  --see handout  --let us know if worsens    5) RTC for cystoscopy.

## 2020-10-16 ENCOUNTER — TELEPHONE (OUTPATIENT)
Dept: OBSTETRICS AND GYNECOLOGY | Facility: CLINIC | Age: 35
End: 2020-10-16

## 2020-10-18 ENCOUNTER — PATIENT MESSAGE (OUTPATIENT)
Dept: UROGYNECOLOGY | Facility: CLINIC | Age: 35
End: 2020-10-18

## 2020-10-18 ENCOUNTER — OFFICE VISIT (OUTPATIENT)
Dept: URGENT CARE | Facility: CLINIC | Age: 35
End: 2020-10-18
Payer: COMMERCIAL

## 2020-10-18 VITALS
SYSTOLIC BLOOD PRESSURE: 114 MMHG | BODY MASS INDEX: 21.99 KG/M2 | OXYGEN SATURATION: 99 % | HEIGHT: 60 IN | WEIGHT: 112 LBS | DIASTOLIC BLOOD PRESSURE: 77 MMHG | HEART RATE: 72 BPM | TEMPERATURE: 98 F

## 2020-10-18 DIAGNOSIS — N30.01 ACUTE CYSTITIS WITH HEMATURIA: Primary | ICD-10-CM

## 2020-10-18 DIAGNOSIS — N30.00 ACUTE CYSTITIS WITHOUT HEMATURIA: Primary | ICD-10-CM

## 2020-10-18 PROBLEM — R39.89 BLADDER PAIN: Status: ACTIVE | Noted: 2020-10-18

## 2020-10-18 PROBLEM — R10.2 PELVIC PAIN IN FEMALE: Status: ACTIVE | Noted: 2020-10-18

## 2020-10-18 LAB
B-HCG UR QL: NEGATIVE
BACTERIA UR CULT: ABNORMAL
BILIRUB UR QL STRIP: NEGATIVE
CTP QC/QA: YES
GLUCOSE UR QL STRIP: NEGATIVE
KETONES UR QL STRIP: NEGATIVE
LEUKOCYTE ESTERASE UR QL STRIP: POSITIVE
PH, POC UA: 7.5 (ref 5–8)
POC BLOOD, URINE: POSITIVE
POC NITRATES, URINE: NEGATIVE
PROT UR QL STRIP: NEGATIVE
SP GR UR STRIP: 1 (ref 1–1.03)
UROBILINOGEN UR STRIP-ACNC: NORMAL (ref 0.1–1.1)

## 2020-10-18 PROCEDURE — 81003 POCT URINALYSIS, DIPSTICK, AUTOMATED, W/O SCOPE: ICD-10-PCS | Mod: QW,S$GLB,, | Performed by: PHYSICIAN ASSISTANT

## 2020-10-18 PROCEDURE — 99214 PR OFFICE/OUTPT VISIT, EST, LEVL IV, 30-39 MIN: ICD-10-PCS | Mod: 25,S$GLB,, | Performed by: PHYSICIAN ASSISTANT

## 2020-10-18 PROCEDURE — 81003 URINALYSIS AUTO W/O SCOPE: CPT | Mod: QW,S$GLB,, | Performed by: PHYSICIAN ASSISTANT

## 2020-10-18 PROCEDURE — 99214 OFFICE O/P EST MOD 30 MIN: CPT | Mod: 25,S$GLB,, | Performed by: PHYSICIAN ASSISTANT

## 2020-10-18 RX ORDER — NITROFURANTOIN 25; 75 MG/1; MG/1
100 CAPSULE ORAL 2 TIMES DAILY
Qty: 10 CAPSULE | Refills: 0 | Status: SHIPPED | OUTPATIENT
Start: 2020-10-18 | End: 2020-10-23

## 2020-10-18 NOTE — PATIENT INSTRUCTIONS
- Rest.    - Drink plenty of fluids.    - Acetaminophen (tylenol) or Ibuprofen (advil,motrin) as directed as needed for fever/pain. Avoid tylenol if you have a history of liver disease. Do not take ibuprofen if you have a history of GI bleeding, kidney disease, or if you take blood thinners.     - You have been given an antibiotic (macrobid) to treat your condition today.    - Please complete the antibiotic as directed on the bottle.   - If you are female and on oral birth control pills, use additional methods to prevent pregnancy while on antibiotics and for one cycle after.   - you can take over-the-counter probiotics during and after antibiotic use to help preserve gut brandy and reduce gastrointestinal symptoms     - Follow up with your PCP or specialty clinic as directed in the next 1-2 weeks if not improved or as needed.  You can call [phone] to schedule an appointment with the appropriate provider.    - Go to the ER or seek medical attention immediately if you develop new or worsening symptoms.    - You must understand that you have received an Urgent Care treatment only and that you may be released before all of your medical problems are known or treated.   - You, the patient, will arrange for follow up care as instructed.   - If your condition worsens or fails to improve we recommend that you receive another evaluation at the ER immediately or contact your PCP to discuss your concerns or return here.           Urinary Tract Infections in Women    Urinary tract infections (UTIs) are most often caused by bacteria (germs). These bacteria enter the urinary tract. The bacteria may come from outside the body. Or they may travel from the skin outside the rectum or vagina into the urethra. Female anatomy makes it easy for bacteria from the bowel to enter a womans urinary tract, which is the most common source of UTI. This means women develop UTIs more often than men. Pain in or around the urinary tract is a  common UTI symptom. But the only way to know for sure if you have a UTI for the healthcare provider to test your urine. The two tests that may be done are the urinalysis and urine culture.  Types of UTIs  · Cystitis: A bladder infection (cystitis) is the most common UTI in women. You may have urgent or frequent urination. You may also have pain, burning when you urinate, and bloody urine.  · Urethritis: This is an inflamed urethra, which is the tube that carries urine from the bladder to outside the body. You may have lower stomach or back pain. You may also have urgent or frequent urination.  · Pyelonephritis: This is a kidney infection. If not treated, it can be serious and damage your kidneys. In severe cases, you may be hospitalized. You may have a fever and lower back pain.  Medicines to treat a UTI  Most UTIs are treated with antibiotics. These kill the bacteria. The length of time you need to take them depends on the type of infection. It may be as short as 3 days. If you have repeated UTIs, a low-dose antibiotic may be needed for several months. Take antibiotics exactly as directed. Dont stop taking them until all of the medicine is gone. If you stop taking the antibiotic too soon, the infection may not go away, and you may develop a resistance to the antibiotic. This can make it much harder to treat.  Lifestyle changes to treat and prevent UTIs  The lifestyle changes below will help get rid of your UTI. They may also help prevent future UTIs.  · Drink plenty of fluids. This includes water, juice, or other caffeine-free drinks. Fluids help flush bacteria out of your body.  · Empty your bladder. Always empty your bladder when you feel the urge to urinate. And always urinate before going to sleep. Urine that stays in your bladder can lead to infection. Try to urinate before and after sex as well.  · Practice good personal hygiene. Wipe yourself from front to back after using the toilet. This helps keep  bacteria from getting into the urethra.  · Use condoms during sex. These help prevent UTIs caused by sexually transmitted bacteria. Also, avoid using spermicides during sex. These can increase the risk of UTIs. Choose other forms of birth control instead. For women who tend to get UTIs after sex, a low-dose of a preventive antibiotic may be used. Be sure to discuss this option with your healthcare provider.  · Follow up with your healthcare provider as directed. He or she may test to make sure the infection has cleared. If needed, more treatment may be started.  Date Last Reviewed: 1/1/2017  © 9864-2387 [employer]. [address] [place] [address], [place] [address]. All rights reserved. This information is not intended as a substitute for professional medical care. Always follow your healthcare professional's instructions.

## 2020-10-18 NOTE — PROGRESS NOTES
Subjective:       Patient ID: Syl Elise is a 34 y.o. female.    Vitals:  height is 5' (1.524 m) and weight is 50.8 kg (112 lb). Her temperature is 98.3 °F (36.8 °C). Her blood pressure is 114/77 and her pulse is 72. Her oxygen saturation is 99%.     Chief Complaint: Urinary Tract Infection    Patient with a history of recurrent UTI presents today with chief complaint of dysuria, urgency, frequency, hematuria that began 4 days ago.  She was seen by urologist who did a urine culture, susceptibility still pending, preliminary read is g negative say.  She denies fever, flank pain, vomiting, abdominal pain.  She is currently not taking any antibiotics.  Last UTI was in June.    Urinary Tract Infection   This is a new problem. The current episode started in the past 7 days (Wednesday). The problem occurs every urination. The problem has been gradually worsening. The quality of the pain is described as burning. The pain is at a severity of 5/10. The pain is moderate. There has been no fever. There is a history of pyelonephritis. Associated symptoms include frequency, hematuria and urgency. Pertinent negatives include no behavior changes, chills, discharge, flank pain, hesitancy, nausea, possible pregnancy, sweats, vomiting, weight loss, bubble bath use, constipation, rash or withholding. Her past medical history is significant for recurrent UTIs. There is no history of catheterization, diabetes insipidus, diabetes mellitus, genitourinary reflux, hypertension, kidney stones, a single kidney, STD, urinary stasis or a urological procedure.       Constitution: Negative for chills, sweating, fatigue, fever and unexpected weight change.   HENT: Negative for ear pain.    Neck: Negative for neck pain, neck stiffness and painful lymph nodes.   Cardiovascular: Negative for chest pain, leg swelling and sob on exertion.   Respiratory: Negative for cough and sputum production.    Gastrointestinal: Negative for abdominal pain,  nausea, vomiting and constipation.   Genitourinary: Positive for dysuria, frequency, urgency and hematuria. Negative for urine decreased, flank pain, bladder incontinence, bed wetting, history of kidney stones, painful menstruation, irregular menstruation, missed menses, heavy menstrual bleeding, ovarian cysts, genital trauma, vaginal pain, vaginal discharge, vaginal bleeding, vaginal odor, painful intercourse, genital sore, painful ejaculation and pelvic pain.   Musculoskeletal: Negative for back pain.   Skin: Negative for rash and lesion.   Neurological: Negative for dizziness, light-headedness and headaches.   Hematologic/Lymphatic: Negative for swollen lymph nodes.       Objective:      Physical Exam   Constitutional: She is oriented to person, place, and time. She appears well-developed.  Non-toxic appearance. She does not appear ill.   HENT:   Head: Normocephalic and atraumatic.   Ears:   Right Ear: External ear normal.   Left Ear: External ear normal.   Nose: Nose normal.   Mouth/Throat: Mucous membranes are normal.   Eyes: Conjunctivae and lids are normal.   Neck: Trachea normal and full passive range of motion without pain. Neck supple.   Cardiovascular: Normal rate, regular rhythm and normal heart sounds.   Pulmonary/Chest: Effort normal. No accessory muscle usage. No tachypnea and no bradypnea. No respiratory distress.   Abdominal: Soft. Normal appearance and bowel sounds are normal. She exhibits no distension, no abdominal bruit, no pulsatile midline mass and no mass. There is no abdominal tenderness. There is no rebound, no guarding, no left CVA tenderness and no right CVA tenderness.      Comments: No CVA or suprapubic tenderness   Musculoskeletal: Normal range of motion.   Neurological: She is alert and oriented to person, place, and time. She has normal strength.   Skin: Skin is warm, dry, intact, not diaphoretic and not pale. Psychiatric: Her speech is normal and behavior is normal. Judgment and  thought content normal.   Nursing note and vitals reviewed.          Results for orders placed or performed in visit on 10/18/20   POCT Urinalysis, Dipstick, Automated, W/O Scope   Result Value Ref Range    POC Blood, Urine Positive (A) Negative    POC Bilirubin, Urine Negative Negative    POC Urobilinogen, Urine Normal 0.1 - 1.1    POC Ketones, Urine Negative Negative    POC Protein, Urine Negative Negative    POC Nitrates, Urine Negative Negative    POC Glucose, Urine Negative Negative    pH, UA 7.5 5 - 8    POC Specific Gravity, Urine 1.005 1.003 - 1.029    POC Leukocytes, Urine Positive (A) Negative   POCT urine pregnancy   Result Value Ref Range    POC Preg Test, Ur Negative Negative     Acceptable Yes        Assessment:       1. Acute cystitis with hematuria        Plan:         Acute cystitis with hematuria  -     POCT Urinalysis, Dipstick, Automated, W/O Scope  -     POCT urine pregnancy  -     nitrofurantoin, macrocrystal-monohydrate, (MACROBID) 100 MG capsule; Take 1 capsule (100 mg total) by mouth 2 (two) times daily. for 5 days  Dispense: 10 capsule; Refill: 0      Patient Instructions   - Rest.    - Drink plenty of fluids.    - Acetaminophen (tylenol) or Ibuprofen (advil,motrin) as directed as needed for fever/pain. Avoid tylenol if you have a history of liver disease. Do not take ibuprofen if you have a history of GI bleeding, kidney disease, or if you take blood thinners.     - You have been given an antibiotic (macrobid) to treat your condition today.    - Please complete the antibiotic as directed on the bottle.   - If you are female and on oral birth control pills, use additional methods to prevent pregnancy while on antibiotics and for one cycle after.   - you can take over-the-counter probiotics during and after antibiotic use to help preserve gut brandy and reduce gastrointestinal symptoms     - Follow up with your PCP or specialty clinic as directed in the next 1-2 weeks if not  improved or as needed.  You can call [phone] to schedule an appointment with the appropriate provider.    - Go to the ER or seek medical attention immediately if you develop new or worsening symptoms.    - You must understand that you have received an Urgent Care treatment only and that you may be released before all of your medical problems are known or treated.   - You, the patient, will arrange for follow up care as instructed.   - If your condition worsens or fails to improve we recommend that you receive another evaluation at the ER immediately or contact your PCP to discuss your concerns or return here.           Urinary Tract Infections in Women    Urinary tract infections (UTIs) are most often caused by bacteria (germs). These bacteria enter the urinary tract. The bacteria may come from outside the body. Or they may travel from the skin outside the rectum or vagina into the urethra. Female anatomy makes it easy for bacteria from the bowel to enter a womans urinary tract, which is the most common source of UTI. This means women develop UTIs more often than men. Pain in or around the urinary tract is a common UTI symptom. But the only way to know for sure if you have a UTI for the healthcare provider to test your urine. The two tests that may be done are the urinalysis and urine culture.  Types of UTIs  · Cystitis: A bladder infection (cystitis) is the most common UTI in women. You may have urgent or frequent urination. You may also have pain, burning when you urinate, and bloody urine.  · Urethritis: This is an inflamed urethra, which is the tube that carries urine from the bladder to outside the body. You may have lower stomach or back pain. You may also have urgent or frequent urination.  · Pyelonephritis: This is a kidney infection. If not treated, it can be serious and damage your kidneys. In severe cases, you may be hospitalized. You may have a fever and lower back pain.  Medicines to treat a  UTI  Most UTIs are treated with antibiotics. These kill the bacteria. The length of time you need to take them depends on the type of infection. It may be as short as 3 days. If you have repeated UTIs, a low-dose antibiotic may be needed for several months. Take antibiotics exactly as directed. Dont stop taking them until all of the medicine is gone. If you stop taking the antibiotic too soon, the infection may not go away, and you may develop a resistance to the antibiotic. This can make it much harder to treat.  Lifestyle changes to treat and prevent UTIs  The lifestyle changes below will help get rid of your UTI. They may also help prevent future UTIs.  · Drink plenty of fluids. This includes water, juice, or other caffeine-free drinks. Fluids help flush bacteria out of your body.  · Empty your bladder. Always empty your bladder when you feel the urge to urinate. And always urinate before going to sleep. Urine that stays in your bladder can lead to infection. Try to urinate before and after sex as well.  · Practice good personal hygiene. Wipe yourself from front to back after using the toilet. This helps keep bacteria from getting into the urethra.  · Use condoms during sex. These help prevent UTIs caused by sexually transmitted bacteria. Also, avoid using spermicides during sex. These can increase the risk of UTIs. Choose other forms of birth control instead. For women who tend to get UTIs after sex, a low-dose of a preventive antibiotic may be used. Be sure to discuss this option with your healthcare provider.  · Follow up with your healthcare provider as directed. He or she may test to make sure the infection has cleared. If needed, more treatment may be started.  Date Last Reviewed: 1/1/2017  © 1457-8709 [email or web address]. [address] [place] [address], [place] [address]. All rights reserved. This information is not intended as a substitute for professional medical care. Always follow your healthcare  professional's instructions.

## 2020-10-19 ENCOUNTER — TELEPHONE (OUTPATIENT)
Dept: UROGYNECOLOGY | Facility: CLINIC | Age: 35
End: 2020-10-19

## 2020-10-19 NOTE — TELEPHONE ENCOUNTER
Spoke to pt, she states she has frequent UTI's and is always RX'd macrobid. Pt states she spoke to an urgent care MD who suggested she contact our office for a different antibiotic because macrobid may not be an effective treatment. She states she can not take cipro because she's on a SSRI. Please advise    Pt is requesting that the new RX be called into the pharmacy in her chart and would to be notified once it's called in.

## 2020-10-19 NOTE — TELEPHONE ENCOUNTER
----- Message from Bassam Waters MA sent at 10/19/2020  4:52 PM CDT -----  Spoke to pt, she states she has frequent UTI's and is always RX'd macrobid. Pt states she spoke to an urgent care MD who suggested she contact our office for a different antibiotic because macrobid may not be an effective treatment. She states she can not take cipro because she's on a SSRI. Please advise     Pt is requesting that the new RX be called into the pharmacy in her chart and would to be notified once it's called in.

## 2020-10-19 NOTE — TELEPHONE ENCOUNTER
----- Message from Elaine Zelaya sent at 10/19/2020 12:52 PM CDT -----  Regarding: Callback  Name of Who is Calling: FENG STEIN What is the request in detail: Pt is requesting a change of  nitrofurantoin, macrocrystal-monohydrate, (MACROBID) 100 MG capsule due to she keep getting UTI she would like someone else due to she have taken this in the past and the UTI keep coming back  Can the clinic reply by [facility]: NO  [facility] [phone]

## 2020-10-19 NOTE — TELEPHONE ENCOUNTER
----- Message from Alfreda Anne sent at 10/19/2020  2:15 PM CDT -----  Type:  Patient Returning Call    Who Called: FENG STEIN [[id number]]    Who Left Message for Patient: Bassam     Does the patient know what this is regarding?:yes     Best Call Back Number: [phone] (home)      Additional Information:

## 2020-10-20 RX ORDER — AMOXICILLIN AND CLAVULANATE POTASSIUM 875; 125 MG/1; MG/1
1 TABLET, FILM COATED ORAL EVERY 12 HOURS
Qty: 14 TABLET | Refills: 0 | Status: SHIPPED | OUTPATIENT
Start: 2020-10-20 | End: 2021-04-01 | Stop reason: ALTCHOICE

## 2020-10-20 NOTE — TELEPHONE ENCOUNTER
Patient has only taken 2 days of macrobid.  Explained that it is sufficient to treat the uti.  She really wants to switch to augmentin.  I additionally offered her keflex for better GI toleration.  Explained that switching can lead to resistance  She will complete macrobid.  I have sent in augmentin for her to have as she is travelling in case UTI symptoms do not resolve.  She verbalizedunderstanding.  Hannah Mortensen, OSEIP-BC

## 2020-10-27 ENCOUNTER — TELEPHONE (OUTPATIENT)
Dept: UROGYNECOLOGY | Facility: CLINIC | Age: 35
End: 2020-10-27

## 2020-10-27 NOTE — TELEPHONE ENCOUNTER
----- Message from Lucie Tamez sent at 10/27/2020  1:47 PM CDT -----  Regarding: FW: call back  Hey! She has a question about her procedure with you guys tomorrow.  ----- Message -----  From: Nayan Vital  Sent: 10/27/2020   1:16 PM CDT  To: [facility] Schedul[facility]  Subject: call back                                         called in to speak with someone at the office regarding her upcoming appointment. She would like a call back from the office and can be reached at    [phone]

## 2020-10-27 NOTE — TELEPHONE ENCOUNTER
Spoke to pt, she states she is still taking her antibiotic, and wanted to know if that was OK for her procedure. Pt was informed that would be fine.

## 2020-10-28 ENCOUNTER — OFFICE VISIT (OUTPATIENT)
Dept: UROGYNECOLOGY | Facility: CLINIC | Age: 35
End: 2020-10-28
Payer: COMMERCIAL

## 2020-10-28 ENCOUNTER — HOSPITAL ENCOUNTER (OUTPATIENT)
Dept: RADIOLOGY | Facility: OTHER | Age: 35
Discharge: HOME | End: 2020-10-28
Attending: OBSTETRICS & GYNECOLOGY
Payer: COMMERCIAL

## 2020-10-28 VITALS
HEIGHT: 60 IN | SYSTOLIC BLOOD PRESSURE: 102 MMHG | DIASTOLIC BLOOD PRESSURE: 68 MMHG | BODY MASS INDEX: 21 KG/M2 | WEIGHT: 106.94 LBS

## 2020-10-28 DIAGNOSIS — R39.89 BLADDER PAIN: ICD-10-CM

## 2020-10-28 DIAGNOSIS — Z87.440 HISTORY OF UTI: Primary | ICD-10-CM

## 2020-10-28 DIAGNOSIS — R10.2 PELVIC PAIN IN FEMALE: ICD-10-CM

## 2020-10-28 DIAGNOSIS — Z87.440 HISTORY OF UTI: ICD-10-CM

## 2020-10-28 LAB
BILIRUB SERPL-MCNC: NORMAL MG/DL
BLOOD URINE, POC: NORMAL
CLARITY, POC UA: CLEAR
COLOR, POC UA: NORMAL
GLUCOSE UR QL STRIP: NORMAL
KETONES UR QL STRIP: NORMAL
LEUKOCYTE ESTERASE URINE, POC: NORMAL
NITRITE, POC UA: NORMAL
PH, POC UA: 7
PROTEIN, POC: NORMAL
SPECIFIC GRAVITY, POC UA: 1
UROBILINOGEN, POC UA: NORMAL

## 2020-10-28 PROCEDURE — 99499 NO LOS: ICD-10-PCS | Mod: S$GLB,,, | Performed by: OBSTETRICS & GYNECOLOGY

## 2020-10-28 PROCEDURE — 81002 URINALYSIS NONAUTO W/O SCOPE: CPT | Mod: S$GLB,,, | Performed by: OBSTETRICS & GYNECOLOGY

## 2020-10-28 PROCEDURE — 52000 PR CYSTOURETHROSCOPY: ICD-10-PCS | Mod: S$GLB,,, | Performed by: OBSTETRICS & GYNECOLOGY

## 2020-10-28 PROCEDURE — 76770 US RETROPERITONEAL COMPLETE: ICD-10-PCS | Mod: 26,,, | Performed by: RADIOLOGY

## 2020-10-28 PROCEDURE — 76830 TRANSVAGINAL US NON-OB: CPT | Mod: 26,,, | Performed by: RADIOLOGY

## 2020-10-28 PROCEDURE — 52000 CYSTOURETHROSCOPY: CPT | Mod: S$GLB,,, | Performed by: OBSTETRICS & GYNECOLOGY

## 2020-10-28 PROCEDURE — 87086 URINE CULTURE/COLONY COUNT: CPT

## 2020-10-28 PROCEDURE — 81002 POCT URINE DIPSTICK WITHOUT MICROSCOPE: ICD-10-PCS | Mod: S$GLB,,, | Performed by: OBSTETRICS & GYNECOLOGY

## 2020-10-28 PROCEDURE — 76856 US PELVIS COMP WITH TRANSVAG NON-OB (XPD): ICD-10-PCS | Mod: 26,,, | Performed by: RADIOLOGY

## 2020-10-28 PROCEDURE — 99499 UNLISTED E&M SERVICE: CPT | Mod: S$GLB,,, | Performed by: OBSTETRICS & GYNECOLOGY

## 2020-10-28 PROCEDURE — 76830 TRANSVAGINAL US NON-OB: CPT | Mod: TC

## 2020-10-28 PROCEDURE — 99999 PR PBB SHADOW E&M-EST. PATIENT-LVL III: CPT | Mod: PBBFAC,,, | Performed by: OBSTETRICS & GYNECOLOGY

## 2020-10-28 PROCEDURE — 76856 US EXAM PELVIC COMPLETE: CPT | Mod: 26,,, | Performed by: RADIOLOGY

## 2020-10-28 PROCEDURE — 99999 PR PBB SHADOW E&M-EST. PATIENT-LVL III: ICD-10-PCS | Mod: PBBFAC,,, | Performed by: OBSTETRICS & GYNECOLOGY

## 2020-10-28 PROCEDURE — 76830 US PELVIS COMP WITH TRANSVAG NON-OB (XPD): ICD-10-PCS | Mod: 26,,, | Performed by: RADIOLOGY

## 2020-10-28 PROCEDURE — 76770 US EXAM ABDO BACK WALL COMP: CPT | Mod: TC

## 2020-10-28 PROCEDURE — 76770 US EXAM ABDO BACK WALL COMP: CPT | Mod: 26,,, | Performed by: RADIOLOGY

## 2020-10-28 RX ORDER — LIDOCAINE HYDROCHLORIDE 20 MG/ML
JELLY TOPICAL ONCE
Status: COMPLETED | OUTPATIENT
Start: 2020-10-28 | End: 2020-10-28

## 2020-10-28 RX ADMIN — LIDOCAINE HYDROCHLORIDE 5 ML: 20 JELLY TOPICAL at 09:10

## 2020-10-28 NOTE — PROGRESS NOTES
Title of Operation:   Cystourethroscopy.     INDICATIONS:  1)  Frequent UTIs (bladder infections):  --urine C&S 5/18, 6/18 NEG; UA 9/18 neg  --urine C&S 1/2020 GBS (treated due to sx);  7/19/2020 (confirmed UTI--no C&S only UA with +LE/neg nit)--took macrobid  --has had increased urgency/hesitancy, straining, can't relax pelvic floor x 1 year but escalating--tried uribel--doesn't always help and not getting as much relief  --yesterday started having lower abd cramping and some blood on wiping and toilet; pain is better today but still present  --has started working on stress/anxiety again with therapy  --is having pain with intercourse (she had this initially but resolved with PT/Francisca)--same partner; worse with deep penetration   --has not been practicing PT exercises regularly; tries to do deep breathing not clench--sounds like she's having trouble with muscle control related to voiding     2) mild cramping mid cycle:  --no bleeding  --sounds like Mitteschmerz     3)  STI screen 12/18: negative.  Chlam/caleb 2/2020 NEG.     PREOPERATIVE DIAGNOSIS  1. Bladder pain    2. Pelvic pain in female    3. History of UTI    4. Myalgia of pelvic floor    5. Urinary urgency    6. Urinary frequency    7. Anxiety and depression      POSTOPERATIVE DIAGNOSIS:   1. Bladder pain    2. Pelvic pain in female    3. History of UTI    4. Myalgia of pelvic floor    5. Urinary urgency    6. Urinary frequency    7. Anxiety and depression    8.    Mild erythema and glomerulations on cystoscopy    Anesthesia:   2% Xylocaine gel.    Specimen (Bacteriological, Pathological or other):   None.     Prosthetic Device/Implant:   None.     Surgeons Narrative:     After informed consent was obtained, the patient was placed in the lithotomy position. The urethral meatus was prepped with Betadine and 10 cubic centimeters of 2% Xylocaine gel were introduced into the urethra. A flexible cystourethroscope was introduced into the bladder. The bladder was  distended with approximately 300 cubic centimeters of sterile water. A systematic survey was performed in which the bladder was surveyed using multiple sequential passes in a clockwise fashion from the bladder dome to the bladder base to the urethrovesical junction. The trigone and ureteral orifices were observed. The scope was then flipped back on itself, and the urethrovesical junction was viewed. A vaginal examining finger was then placed with pressure suburethrally at the urethrovesical junction as the telescope was withdrawn in order to perform positive pressure urethroscopy.  Standard maneuvers of cough, squeeze and Valsalva were performed. The telescope was then completely withdrawn.     Findings: Urethroscopy:  Normal.  Cystoscopy:  Normal bladder mucosa with mild erythema and glomerulations throughout, bilateral ureteral flow was noted.     Assessment: Essentially normal cystourethroscopy with mild erythema and glomerulations throughout.     Plan:     1)  Frequent UTIs (bladder infections):  --urine C&S for TREVOR today   --treated for e coli UTI 10/15/2020  --s/p empirical treatment for ureaplasma/myoplasma  --If you feel like you have a UTI, please call our office so that we can place an order for you to drop off a urine specimen at the closest [facility] lab (we will arrange).                --We will call in antibiotics for you to start right after you drop off specimen.                --In this way, we can determine:                           1)  Do you have a UTI?                            2) If you have a UTI, is it sensitive to the antibiotics we prescribed?   --concern for levator/pelvic floor muscle tension:              --start amitriptyline: 10 mg PO nightly x 7 days. Can break in half if 10 mg too much. Can add 10 mg every week until symptoms controlled. Max dose 50 mg PO nightly.              FOR FLARES:  Can take uribel up to 4x/day x 5 days.  If persists past that, call us.                --continue talk therapy              --restart pelvic floor PT                          [facility] (all take Medicaid):  1)  TAYLOR Monroy (Earlene Niño Nickie or Carisa Koch): (p) [phone].  Or [phone].   2)  TAYLOR Campbell. (p) [phone].       --follow UTI prevention tips (see attached)  --control bowel movements/fecal cross-contamination  --empty bladder before and after intercourse  --start taking 1 probiotic pill (any with lactobacillus and/or acidophilus) daily  --renal + pelvic ultrasound: normal               If Irritative voiding symptoms:  --Empty bladder every 3 hours.  Empty well: wait a minute, lean forward on toilet.    --Avoid dietary irritants (see sheet).  Keep diary x 3-5 days to determine your irritants.  --can try prelief with meals, neutralizes acid, if having bad day or consuming a trigger  --continue pelvic floor PT with Francisca  --URGE: start starting amitriptyline: 10 mg PO nightly x 7 days.  Can add 10 mg every week until symptoms controlled. Max dose 50 mg PO nightly. consider medication daily (anticholinergic).    --control stress/anxiety, cognitive behavioral therapy; let psych know that stress level has increased  --discussed cystoscopy findings; consider IC if symptoms refractory; discussed possible cysto/hydrodistension for more accurate Dx and possible Tx if symptoms refractory     3) STI screening:  --12/18 STI screen normal  --2/2020 chlam/caleb negative     4) cramping mid-cycle:  --seems likely Mittelschmerz (ovulatory pain)  --consider ibuprofen or Aleve as needed  --see handout  --let us know if worsens     5) Send us message in about 1 month about how your're doing.

## 2020-10-29 LAB — BACTERIA UR CULT: NO GROWTH

## 2020-10-30 ENCOUNTER — TELEPHONE (OUTPATIENT)
Dept: UROGYNECOLOGY | Facility: CLINIC | Age: 35
End: 2020-10-30

## 2020-10-30 NOTE — TELEPHONE ENCOUNTER
----- Message from Romulo Neville MD sent at 10/30/2020  7:07 AM CDT -----  Please let the patient know urine C&S was negative for infection.  Thanks!

## 2020-10-30 NOTE — TELEPHONE ENCOUNTER
Pt was informed on her voice mail that her urine culture was negative for infection and if she has any questions to call the office at [phone]

## 2020-11-02 ENCOUNTER — PATIENT MESSAGE (OUTPATIENT)
Dept: UROGYNECOLOGY | Facility: CLINIC | Age: 35
End: 2020-11-02

## 2020-11-02 ENCOUNTER — PATIENT MESSAGE (OUTPATIENT)
Dept: INTERNAL MEDICINE | Facility: CLINIC | Age: 35
End: 2020-11-02

## 2020-11-02 DIAGNOSIS — R39.89 BLADDER PAIN: Primary | ICD-10-CM

## 2020-11-02 DIAGNOSIS — R35.0 URINARY FREQUENCY: ICD-10-CM

## 2020-11-02 DIAGNOSIS — M79.18 MYALGIA OF PELVIC FLOOR: ICD-10-CM

## 2020-11-09 ENCOUNTER — TELEPHONE (OUTPATIENT)
Dept: ENDOSCOPY | Facility: HOSPITAL | Age: 35
End: 2020-11-09

## 2020-11-19 ENCOUNTER — PATIENT MESSAGE (OUTPATIENT)
Dept: ENDOSCOPY | Facility: HOSPITAL | Age: 35
End: 2020-11-19

## 2020-11-19 ENCOUNTER — PATIENT MESSAGE (OUTPATIENT)
Dept: INTERNAL MEDICINE | Facility: CLINIC | Age: 35
End: 2020-11-19

## 2020-11-19 DIAGNOSIS — Z20.822 EXPOSURE TO COVID-19 VIRUS: Primary | ICD-10-CM

## 2020-11-19 DIAGNOSIS — Z03.818 ENCOUNTER FOR OBSERVATION FOR SUSPECTED EXPOSURE TO OTHER BIOLOGICAL AGENTS RULED OUT: ICD-10-CM

## 2020-11-19 NOTE — TELEPHONE ENCOUNTER
I can order the OP swab - please arrange this     The rapid tests can only be obtained through and urgent care or ER

## 2020-11-23 ENCOUNTER — CLINICAL SUPPORT (OUTPATIENT)
Dept: GASTROENTEROLOGY | Facility: CLINIC | Age: 35
End: 2020-11-23
Payer: COMMERCIAL

## 2020-11-23 DIAGNOSIS — F32.A ANXIETY AND DEPRESSION: ICD-10-CM

## 2020-11-23 DIAGNOSIS — R19.7 DIARRHEA, UNSPECIFIED TYPE: ICD-10-CM

## 2020-11-23 DIAGNOSIS — R10.9 ABDOMINAL PAIN, UNSPECIFIED ABDOMINAL LOCATION: ICD-10-CM

## 2020-11-23 DIAGNOSIS — R14.0 BLOATING: Primary | ICD-10-CM

## 2020-11-23 DIAGNOSIS — F41.9 ANXIETY AND DEPRESSION: ICD-10-CM

## 2020-11-23 DIAGNOSIS — F90.0 ATTENTION DEFICIT HYPERACTIVITY DISORDER (ADHD), PREDOMINANTLY INATTENTIVE TYPE: ICD-10-CM

## 2020-11-23 PROCEDURE — 99499 UNLISTED E&M SERVICE: CPT | Mod: 95,,, | Performed by: DIETITIAN, REGISTERED

## 2020-11-23 PROCEDURE — 99499 NO LOS: ICD-10-PCS | Mod: 95,,, | Performed by: DIETITIAN, REGISTERED

## 2020-12-04 ENCOUNTER — CLINICAL SUPPORT (OUTPATIENT)
Dept: REHABILITATION | Facility: OTHER | Age: 35
End: 2020-12-04
Payer: COMMERCIAL

## 2020-12-04 DIAGNOSIS — M79.18 MYALGIA OF PELVIC FLOOR: ICD-10-CM

## 2020-12-04 DIAGNOSIS — R35.0 URINARY FREQUENCY: ICD-10-CM

## 2020-12-04 DIAGNOSIS — M62.89 PELVIC FLOOR DYSFUNCTION: ICD-10-CM

## 2020-12-04 DIAGNOSIS — R39.89 BLADDER PAIN: ICD-10-CM

## 2020-12-04 DIAGNOSIS — M62.838 MUSCLE SPASM: ICD-10-CM

## 2020-12-04 DIAGNOSIS — R10.2 PELVIC PAIN: ICD-10-CM

## 2020-12-04 PROCEDURE — 97140 MANUAL THERAPY 1/> REGIONS: CPT

## 2020-12-04 PROCEDURE — 97161 PT EVAL LOW COMPLEX 20 MIN: CPT

## 2020-12-04 PROCEDURE — 97112 NEUROMUSCULAR REEDUCATION: CPT

## 2020-12-04 NOTE — PATIENT INSTRUCTIONS
"Home Exercise Program: 12/4/2020      "CHECK IN" WITH YOUR PELVIC FLOOR     Every hour, "check in" with your pelvic floor.  o Is it tight and contracted?  o Is it relaxed and dropped?     Take 10 seconds and try to release any tension in the pelvic floor muscles and external anal sphincter. You can try one or more of the following techniques.   o Diaphragmatic Breathing  o One quick flick  o Mindfulness  o Body scan   o Gently pushing out your pelvic floor      Then return to your regular tasks.    Do this every hour throughout the day in any position.       Home Exercise Program: 12/04/2020     ABDOMINAL WALL MOBILIZATION  - Gently massage your abdominal wall muscles in all directions both superficially and deeply.   - Apply as much pressure as you are able to tolerate without more than a slight increase in discomfort.    - While applying pressure you can perform circular motions or you can "scoop and pull" your tissue up for a sustained stretch.      Do for 5-10 minutes every day.      "

## 2020-12-04 NOTE — PLAN OF CARE
[facility]  Pelvic Health Physical Therapy Initial Evaluation    Date: 12/4/2020   Name: Syl Elise  Clinic Number: [id number]  Therapy Diagnosis:   Encounter Diagnoses   Name Primary?    Bladder pain     Myalgia of pelvic floor     Urinary frequency     Pelvic pain     Muscle spasm     Pelvic floor dysfunction      Physician: Hannah Mortensen NP    Physician Orders: PT Eval and Treat   Medical Diagnosis from Referral: myalgia of the pelvic floor, bladder pain, urinary frequency  Evaluation Date: 12/4/2020  Authorization Period Expiration: 12/31/2020  Plan of Care Expiration: 2-  Visit # / Visits authorized: 1/ 20    Time In: 1335  Time Out: 1430  Total Appointment Time (timed & untimed codes): 55 minutes    Precautions: universal    Subjective     Date of onset: 2017    History of current condition - Syl reports: She has a history of chronic pelvic pain with urinary urgency. She reports issues with UTI-like symptoms with a history of chronic UTIs as well.  She feels like her symptoms have worsened over the past year.  She reports issues with urinary frequency and frequency. She has leaked urine on occasion with a strong urge.           She has had bilateral hip pain and has done PT in the past.  She reports the dry needling really helped her symptoms.      OB/GYN History:   Sexually active? Yes.  Has worsened in the past 3 months  Pain with vaginal exams, intercourse or tampon use? with intercourse and with vaginal exam    Bladder/Bowel History:    Frequency of urination:   Daytime: every hour           Nighttime: 0-2x   Difficulty initiating urine stream: Yes.  She has hesitancy on occasion.  Sometimes has to push.     Urine stream: weak, strong and splayed   Complete emptying: Yes   Bladder leakage: Yes   Activities that cause leakage: with a strong urge   Frequency of incidents: several times a week.    Amount leaked (urine): few drops   Urinary Urgency:  Yes; able to delay the urge for at least 1 minute(s) with an intense urge. Otherwise she is able to delay at least 15 minutes.       Frequency of bowel movements: 1-2 times a day   Difficulty initiating BM: Yes   Quality/Shape of BM: [place] Stool Chart 3 or 4 but has been a 6 or 7 in the recent past   Does Patient Feel Empty after BM? No   Bowel Urgency: No; able to delay the urge for at least 15 minute(s).   Fiber Supplements or Laxative Use? No    Colon leakage: None over the past few years.      Form of protection: none    Pain:  Location: pelvic pain, suprapubic pain  Current 2/10, worst 6/10, best 0/10   Pelvic Pain Duration Occurs during and after provocation  and lasts 1-2 days after provocation. Often limits sexual activity due to pain.     Location of Pelvic Pain: Introitus and Deep pelvic floor muscles   Description: Aching, Dull and spasm   Aggravating Factors/Activities that cause symptoms: Vaginal exam/provocation   Easing Factors: termination of vaginal exam/intercourse and urabel was working but is not currently      Medical History: Syl  has a past medical history of Abnormal Pap smear of cervix (2004), ADHD (attention deficit hyperactivity disorder), Hip pain, Strabismus, and Vitamin D deficiency.     Surgical History: Syl Elise  has a past surgical history that includes Cervical biopsy w/ loop electrode excision (2005); Eye surgery (1986, 1990); Esophagogastroduodenoscopy (N/A, 2/7/2020); Colonoscopy (N/A, 2/7/2020); and Colon surgery (2020).    Medications: Syl has a current medication list which includes the following prescription(s): amoxicillin-clavulanate 875-125mg, cholecalciferol (vitamin d3), dextroamphetamine-amphetamine, escitalopram oxalate, methen-m.blue-s.phos-phsal-hyo, multivitamin, and nitrofurantoin (macrocrystal-monohydrate).    Allergies:   Review of patient's allergies indicates:   Allergen Reactions    Bactrim [sulfamethoxazole-trimethoprim] Other  (See Comments)     Skin tingling, muscle aches/weakness    Sulfa (sulfonamide antibiotics)      Other reaction(s): Other (See Comments)  Skin numbness          Prior Therapy/Previous treatment included: Pelvic PT for 3 visits.  Was not able to continue due to conflicting PT appointments for her hip pain.   Social History: lives with 2 roommates   Current exercise: walking, pilates.  Pt reports she is fearful of running because of her history of hip pain.    Occupation: was an  but is not currently working.  In the process of applying for jobs  Prior Level of Function: History of pelvic pain and urinary issues.   Current Level of Function: pain with ADLs, urinary urgency and frequency affecting ADLs    Types of fluid intake: water  Diet: Low FODMAP    Habitus: well developed, well nourished  Abuse/Neglect: No None reported by patient at this visit.     Constitutional Symptoms Review: The patient denies having any constitutional symptoms.     Flags Screening  Red Flags:family history of cancer: breast cancer     Pts goals: decrease pelvic pain and urinary frequency/urgency     OBJECTIVE     See EMR under MEDIA for written consent provided 12/4/2020  Chaperone: declined    ORTHO SCREEN  Posture in sitting: slouched   Posture in standing: pain posturing evident  and forward head    ABDOMINAL WALL ASSESSMENT  Palpation: increased tension  and hypertonic in suprapubic region especially in the RLQ  Abdominal strength: Rectus abdominus: 4-/5     Transverse abdominus: 4-/5  Scarring: none noted  Pelvic Floor Muscle and Transverse Abdominus Synergy: absent      VAGINAL PELVIC FLOOR EXAM    EXTERNAL ASSESSMENT  Introitus: WNL  Skin condition: redness noted  Scarring: none   Sensation: WNL   Pain: none  Voluntary contraction: visible lift  Voluntary relaxation: visible drop  Bearing down: visible drop  Perineal descent: present        INTERNAL ASSESSMENT  Pain: tender areas noted as follows: negro levator  ani and OI   Sensation: able to sense generalized pressure, unable to identify location   Vaginal vault: WNL   Muscle Bulk: hypertonus   Muscle Power: 2/5    Quality of contraction: slow rise, slow relaxation and incomplete relaxation   Specificity: WNL   Coordination: tends to hold breath during PFM contration   Comments: pt able to relax and drop her pelvic floor with diaphragmatic breathing         TREATMENT     Treatment Time In: 1405  Treatment Time Out: 1430  Total Treatment time (time-based codes) separate from Evaluation: 25 minutes      Neuromuscular Re-education to develop Coordination, Control and Down training for 10 minutes including:   pelvic floor relaxation/bulging training      Manual Therapy to develop flexibility and extensibility for 10 minutes including:   soft tissue mobilization of suprapubic abdominal wall muscles      Therapeutic Activity Patient participated in dynamic functional therapeutic activities to improve functional performance for 5 minutes. Including: Education as described below.         Patient Education provided:   general anatomy/physiology of urinary/ bowel  system and benefits of treatment was discussed with the pt. Additionally, anatomy/physiology of pelvic floor was reviewed.     Home Exercises provided:  Written Home Exercises provided: yes.  Exercises were reviewed and Syl was able to demonstrate them prior to the end of the session.    Syl demonstrated good  understanding of the education provided.     See EMR under Patient Instructions for exercises provided 12/4/2020.    Assessment     Syl is a 35 y.o. female referred to outpatient Physical Therapy with a medical diagnosis of myalgia of the pelvic floor, bladder pain, urinary frequency.  Pt presents with pelvic floor tenderness, increased tension of the pelvic muscles and poor quality of pelvic muscle contraction. Pt  reports that the pain is chronic and continues to worsen which is impacting ADL  participation.  Examination reveals pelvic floor coordination deficits and increased muscular tone with soft tissue restrictions. The patient is expected to benefit from skilled intervention to work towards improving lumbopelvic dysfunction, pelvic floor relaxation and coordination as well as improving overall strength and ROM in order to return towards prior level of function and ADL participation.           Pt prognosis is Excellent.   Pt will benefit from skilled outpatient Physical Therapy to address the deficits stated above and in the chart below, provide pt/family education, and to maximize pt's level of independence.     Plan of care discussed with patient: Yes  Pt's spiritual, cultural and educational needs considered and patient is agreeable to the plan of care and goals as stated below:     Anticipated Barriers for therapy: none    Medical Necessity is demonstrated by the following:    History  Co-morbidities and personal factors that may impact the plan of care Co-morbidities   ADHD, strabismus, colon surgery    Personal Factors  no deficits     low   Examination  Body structures and functions, activity limitations and participation restrictions that may impact the plan of care Body Regions/Systems/Functions:  pelvic floor tenderness, increased tension of the pelvic muscles and poor quality of pelvic muscle contraction     Activity Limitations:  delaying urge to urinate, Initiating urine stream and intercourse/vaginal exam/tampon use without pain    Participation Restrictions:  all ADLs/iADLs uninterrupted by urinary incontinence/urgency/frequency and relationship with spouse/partner    Activity limitations:   Learning and applying knowledge  no deficits    General Tasks and Commands  no deficits    Communication  no deficits    Mobility  no deficits    Self care  no deficits    Domestic Life  no deficits    Interactions/Relationships  no deficits    Life Areas  no deficits    Community and Social  Life  no deficits       low   Clinical Presentation stable and uncomplicated low   Decision Making/ Complexity Score: low       Goals:  Short Term Goals: 4 weeks   Pt to be able to delay the urge to urinate at least 5 minutes with a strong urge to urinate in order to make it to the bathroom without leaking.  Pt to voice understanding of the role that diet plays on urinary urgency.    Pt to demonstrate proper diaphragmatic breathing to help with calming the nervous system in order to decrease pain and to improve abdominal wall musculature extensibility.   Pt to report a decrease in pain to no more than 4 at it's worst with pelvic floor muscle provocation.    Pt to demonstrate proper positioning on commode with breathing techniques to decrease strain with BM to enable pt to feel empty after BM.       Long Term Goals: 12 weeks   Pt to be discharged with home plan for carry over after discharge.    Pt to be able to delay the urge to urinate at least 10 minutes with a strong urge to urinate in order to make it to the bathroom without leaking.  Pt to report a decrease in pain to no more than 2 at it's worst with direct pelvic floor muscle provocation.            Plan     Plan of care Certification: 12/4/2020 to 2-26-21.    Outpatient Physical Therapy 1 times weekly for 12 weeks to include the following interventions: therapeutic exercises, therapeutic activity, neuromuscular re-education, gait training, manual therapy, modalities PRN, patient/family education, dry needling and self care/home management    Earlene Fu, PT

## 2020-12-08 ENCOUNTER — CLINICAL SUPPORT (OUTPATIENT)
Dept: REHABILITATION | Facility: HOSPITAL | Age: 35
End: 2020-12-08
Payer: COMMERCIAL

## 2020-12-08 DIAGNOSIS — M62.838 MUSCLE SPASM: Primary | ICD-10-CM

## 2020-12-08 DIAGNOSIS — M62.89 PELVIC FLOOR DYSFUNCTION: ICD-10-CM

## 2020-12-08 PROCEDURE — 97112 NEUROMUSCULAR REEDUCATION: CPT | Mod: PO

## 2020-12-08 PROCEDURE — 97140 MANUAL THERAPY 1/> REGIONS: CPT | Mod: PO

## 2020-12-08 NOTE — PROGRESS NOTES
Pelvic Health Physical Therapy   Treatment Note     Name: Syl Elise  Clinic Number: [id number]    Therapy Diagnosis:   Encounter Diagnoses   Name Primary?    Muscle spasm Yes    Pelvic floor dysfunction      Physician: Hannah Mortensen NP    Visit Date: 12/8/2020    Physician Orders: PT Eval and Treat   Medical Diagnosis from Referral: myalgia of the pelvic floor, bladder pain, urinary frequency  Evaluation Date: 12/4/2020  Authorization Period Expiration: 12/31/2020  Plan of Care Expiration: 2-  Visit # / Visits authorized: 2/ 20  Cancelled Visits: 0  No Show Visits: 0    Time In: 1238  Time Out: 1330  Total Billable Time: 52 minutes    Precautions: Standard    Subjective     Pt reports: She has become more aware of pelvic floor clenching.  She was compliant with home exercise program.  Response to previous treatment: no changes reported  Functional change: no changes reported    Pain: 0/10  Location: suprapubic region and vaginal vault     Objective   Pt verbally consents to intravaginal treatment today.  Signed consent form already on file.       Syl received the following manual therapy techniques: to develop flexibility and extensibility for 38 minutes including: trigger point/myofascial release of intravaginal pelvic floor muscles in layer 3 bilaterally and visceral mobilization of bladder neck and fascial around urethra  STM to abdominal wall musculature focusing on suprapubic region to decrease strain to pelvic structures.     Syl participated in neuromuscular re-education activities to develop Coordination, Control and Down training for 14 minutes including: pelvic floor relaxation/bulging training      Home Exercises Provided and Patient Education Provided     Education provided:   - anatomy/physiology of pelvic floor  Discussed progression of plan of care with patient; educated pt in activity modification; reviewed HEP with pt. Pt demonstrated and verbalized understanding  of all instruction and was not provided with a handout of HEP (see Patient Instructions).      Written Home Exercises Provided: Patient instructed to cont prior HEP.  Exercises were reviewed and Syl was able to demonstrate them prior to the end of the session.  Syl demonstrated good  understanding of the education provided.     See EMR under Patient Instructions for exercises provided 12/8/2020.    Assessment     Increased time spend on addressing soft tissue restrictions within the pelvic floor musculature intravaginally as well as the suprapubic abdominal wall.  Also worked on pelvic floor muscle relaxation training.        Syl is progressing well towards her goals.   Pt prognosis is Excellent.     Pt will continue to benefit from skilled outpatient physical therapy to address the deficits listed in the problem list box on initial evaluation, provide pt/family education and to maximize pt's level of independence in the home and community environment.     Pt's spiritual, cultural and educational needs considered and pt agreeable to plan of care and goals.     Anticipated barriers to physical therapy: none    Goals:   Short Term Goals: 4 weeks   Pt to be able to delay the urge to urinate at least 5 minutes with a strong urge to urinate in order to make it to the bathroom without leaking.  Pt to voice understanding of the role that diet plays on urinary urgency.    Pt to demonstrate proper diaphragmatic breathing to help with calming the nervous system in order to decrease pain and to improve abdominal wall musculature extensibility.   Pt to report a decrease in pain to no more than 4 at it's worst with pelvic floor muscle provocation.    Pt to demonstrate proper positioning on commode with breathing techniques to decrease strain with BM to enable pt to feel empty after BM.         Long Term Goals: 12 weeks   Pt to be discharged with home plan for carry over after discharge.    Pt to be able to  delay the urge to urinate at least 10 minutes with a strong urge to urinate in order to make it to the bathroom without leaking.  Pt to report a decrease in pain to no more than 2 at it's worst with direct pelvic floor muscle provocation.      Plan     Plan of care Certification: 12/4/2020 to 2-26-21.     Outpatient Physical Therapy 1 times weekly for 12 weeks to include the following interventions: therapeutic exercises, therapeutic activity, neuromuscular re-education, gait training, manual therapy, modalities PRN, patient/family education, dry needling and self care/home management    Earlene Fu, PT

## 2020-12-17 ENCOUNTER — PATIENT OUTREACH (OUTPATIENT)
Dept: ADMINISTRATIVE | Facility: OTHER | Age: 35
End: 2020-12-17

## 2020-12-21 ENCOUNTER — OFFICE VISIT (OUTPATIENT)
Dept: OBSTETRICS AND GYNECOLOGY | Facility: CLINIC | Age: 35
End: 2020-12-21
Payer: COMMERCIAL

## 2020-12-21 VITALS
SYSTOLIC BLOOD PRESSURE: 124 MMHG | HEIGHT: 60 IN | BODY MASS INDEX: 21.12 KG/M2 | WEIGHT: 107.56 LBS | DIASTOLIC BLOOD PRESSURE: 62 MMHG

## 2020-12-21 DIAGNOSIS — N93.9 ABNORMAL UTERINE BLEEDING: ICD-10-CM

## 2020-12-21 DIAGNOSIS — Z31.69 ENCOUNTER FOR PRECONCEPTION CONSULTATION: Primary | ICD-10-CM

## 2020-12-21 PROCEDURE — 99213 PR OFFICE/OUTPT VISIT, EST, LEVL III, 20-29 MIN: ICD-10-PCS | Mod: S$GLB,,, | Performed by: OBSTETRICS & GYNECOLOGY

## 2020-12-21 PROCEDURE — 99999 PR PBB SHADOW E&M-EST. PATIENT-LVL III: CPT | Mod: PBBFAC,,, | Performed by: OBSTETRICS & GYNECOLOGY

## 2020-12-21 PROCEDURE — 99999 PR PBB SHADOW E&M-EST. PATIENT-LVL III: ICD-10-PCS | Mod: PBBFAC,,, | Performed by: OBSTETRICS & GYNECOLOGY

## 2020-12-21 PROCEDURE — 99213 OFFICE O/P EST LOW 20 MIN: CPT | Mod: S$GLB,,, | Performed by: OBSTETRICS & GYNECOLOGY

## 2020-12-21 PROCEDURE — 3008F BODY MASS INDEX DOCD: CPT | Mod: CPTII,S$GLB,, | Performed by: OBSTETRICS & GYNECOLOGY

## 2020-12-21 PROCEDURE — 1126F PR PAIN SEVERITY QUANTIFIED, NO PAIN PRESENT: ICD-10-PCS | Mod: S$GLB,,, | Performed by: OBSTETRICS & GYNECOLOGY

## 2020-12-21 PROCEDURE — 1126F AMNT PAIN NOTED NONE PRSNT: CPT | Mod: S$GLB,,, | Performed by: OBSTETRICS & GYNECOLOGY

## 2020-12-21 PROCEDURE — 3008F PR BODY MASS INDEX (BMI) DOCUMENTED: ICD-10-PCS | Mod: CPTII,S$GLB,, | Performed by: OBSTETRICS & GYNECOLOGY

## 2020-12-21 RX ORDER — ASCORBIC ACID, CHOLECALCIFEROL, .ALPHA.-TOCOPHEROL ACETATE, DL-, PYRIDOXINE HYDROCHLORIDE, FOLIC ACID, CYANOCOBALAMIN, BIOTIN, CALCIUM CARBONATE, FERROUS ASPARTO GLYCINATE, IRON, POTASSIUM IODIDE, MAGNESIUM OXIDE, DOCONEXENT AND LOWBUSH BLUEBERRY 60; 1000; 10; 26; 400; 13; 280; 80; 9; 9; 150; 25; 350; 25; 600 MG/1; [IU]/1; [IU]/1; MG/1; UG/1; UG/1; UG/1; MG/1; MG/1; MG/1; UG/1; MG/1; MG/1; MG/1; UG/1
1 CAPSULE, GELATIN COATED ORAL DAILY
Qty: 30 CAPSULE | Refills: 11 | Status: SHIPPED | OUTPATIENT
Start: 2020-12-21 | End: 2021-01-28 | Stop reason: SDUPTHER

## 2020-12-21 NOTE — PATIENT INSTRUCTIONS
1. Are you ovulating?  - PreMom wild - can take pictures of   - The first day of your cycle is Day #1  - Buy ovulation predictor kits: Day 10-Day 16 (Ovulate on average Day 14). If you get a positive, have intercourse that day and the next  - Come to the lab on Day#21 (in a 28 day cycle) and have bloodwork (progesterone level). Or can have drawn 7 days after your peak. Message me with your peak.  - Consider AMH level - discussed limitations  - FSH, LH, estradiol, TSH and prolactin (AM)     2. Tubal Factor Infertility  1. When the fallopian tubes are blocked  2. Causes: endometriosis or history of chlamydia  3. Hysterosalpingogram - [facility] - ~$550. Dye/Xray test.      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 Unexplained Infertility     5. Structural causes  - Fibroid, Uterine Septum  - Normal ultrasound in Oct

## 2020-12-21 NOTE — PROGRESS NOTES
Subjective:       Patient ID: Syl Elise is a 35 y.o. female.    Chief Complaint:  Advice Only (fertility )    History of Present Illness:  HPI  36 y/o  presents for preconception counseling.   Reports cycles are regular, occurring every 28-30 days, lasting for 3-4 days with light flow. She has noticed some irregularity 3-4 days with stress recently. Reports less dysmenorrhea. She has not taken OPKs.    She is not actively attempting pregnancy. She has been having unprotected intercourse for the past 1-2 years (not having frequent intercourse). Her partner does not have children. He works outside ([employer] industry), stopped smoking 6 years ago, rides bikes recreationally.     GYN & OB History  Patient's last menstrual period was 2020 (exact date).   Date of Last Pap: 3/9/2020    OB History    Para Term  AB Living   0 0 0 0 0 0   SAB TAB Ectopic Multiple Live Births   0 0 0 0 0       Review of Systems  Review of Systems   Constitutional: Negative for chills, diaphoresis, fatigue and fever.   Respiratory: Negative for cough and shortness of breath.    Cardiovascular: Negative for chest pain and palpitations.   Gastrointestinal: Negative for abdominal pain, constipation, diarrhea, nausea and vomiting.   Genitourinary: Negative for dysmenorrhea, dyspareunia, menorrhagia, menstrual problem, pelvic pain, vaginal bleeding, vaginal discharge and vaginal pain.   Musculoskeletal: Negative for back pain and myalgias.   Integumentary:  Negative for rash, acne, breast mass, nipple discharge and breast skin changes.   Neurological: Negative for headaches.   Psychiatric/Behavioral: Negative for depression. The patient is not nervous/anxious.    Breast: Negative for mass, mastodynia, nipple discharge and skin changes          Objective:    Physical Exam:   Constitutional: She is oriented to person, place, and time. She appears well-developed and well-nourished. No distress.    HENT:   Head:  Normocephalic and atraumatic.    Eyes: EOM are normal.    Neck: Normal range of motion.     Pulmonary/Chest: Effort normal.                  Musculoskeletal: Normal range of motion and moves all extremeties.       Neurological: She is alert and oriented to person, place, and time.    Skin: No rash noted.    Psychiatric: She has a normal mood and affect. Her behavior is normal. Judgment and thought content normal.          Assessment:        1. Encounter for preconception consultation    2. Abnormal uterine bleeding              Plan:      Syl was seen today for advice only.    Diagnoses and all orders for this visit:    Encounter for preconception consultation  PNV daily  May have to go to outside lab due to insurance changes  Declined CF mutation testing at this time.   She will notify me with her next cycle for lab scheduling.   Discussed egg cryopreservation, she will consider options.    1. Are you ovulating?  - PreMom wild - can take pictures of   - The first day of your cycle is Day #1  - Buy ovulation predictor kits: Day 10-Day 16 (Ovulate on average Day 14). If you get a positive, have intercourse that day and the next  - Come to the lab on Day#21 (in a 28 day cycle) and have bloodwork (progesterone level). Or can have drawn 7 days after your peak. Message me with your peak.  - Consider AMH level - discussed limitations  - FSH, LH, estradiol, TSH and prolactin (AM)     2. Tubal Factor Infertility  1. When the fallopian tubes are blocked  2. Causes: endometriosis or history of chlamydia  3. Hysterosalpingogram - [facility] - ~$550. Dye/Xray test.      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 Unexplained Infertility     5. Structural causes  - Fibroid, Uterine Septum  - Normal ultrasound in Oct    Abnormal uterine bleeding    Orders Placed This Encounter   Procedures    Prolactin    TSH    Follicle  Stimulating Hormone    Luteinizing Hormone    Estradiol    HIV 1/2 Ag/Ab (4th Gen)    RPR    Rubella Antibody, IgG    Hepatitis Panel, Acute    Varicella zoster antibody, IgG       Follow up if symptoms worsen or fail to improve.

## 2020-12-22 ENCOUNTER — CLINICAL SUPPORT (OUTPATIENT)
Dept: REHABILITATION | Facility: HOSPITAL | Age: 35
End: 2020-12-22
Attending: INTERNAL MEDICINE
Payer: COMMERCIAL

## 2020-12-22 DIAGNOSIS — M62.838 MUSCLE SPASM: Primary | ICD-10-CM

## 2020-12-22 DIAGNOSIS — M62.89 PELVIC FLOOR DYSFUNCTION: ICD-10-CM

## 2020-12-22 PROCEDURE — 97140 MANUAL THERAPY 1/> REGIONS: CPT | Mod: PO

## 2020-12-22 PROCEDURE — 97112 NEUROMUSCULAR REEDUCATION: CPT | Mod: PO

## 2020-12-22 NOTE — PROGRESS NOTES
Pelvic Health Physical Therapy   Treatment Note     Name: Syl Elise  Clinic Number: [id number]    Therapy Diagnosis:   Encounter Diagnoses   Name Primary?    Muscle spasm Yes    Pelvic floor dysfunction      Physician: Hannah Mortensen NP    Visit Date: 12/22/2020    Physician Orders: PT Eval and Treat   Medical Diagnosis from Referral: myalgia of the pelvic floor, bladder pain, urinary frequency  Evaluation Date: 12/4/2020  Authorization Period Expiration: 12/31/2020  Plan of Care Expiration: 2-  Visit # / Visits authorized: 3/ 20  Cancelled Visits: 0  No Show Visits: 0    Time In: 1237   Time Out: 1330   Total Billable Time: 53  minutes    Precautions: Standard    Subjective     Pt reports: She reports she was sore the day after for about a day. She continues to be aware of pelvic floor clenching especially with increased stress.  She is trying to be more aware of how often she urinates.  She is trying to increased the time between voids.  Continues to have some urine leakage with strong urge.      She was compliant with home exercise program.  Response to previous treatment: no changes reported  Functional change: no changes reported    Pain: 0/10    Location: suprapubic region and vaginal vault     Objective   Pt verbally consents to intravaginal treatment today.  Signed consent form already on file.       Syl received the following manual therapy techniques: to develop flexibility and extensibility for 27 minutes including: trigger point/myofascial release of intravaginal pelvic floor muscles in layer 3 bilaterally and visceral mobilization of bladder neck and fascial around urethra     Not performed today:  STM to abdominal wall musculature focusing on suprapubic region to decrease strain to pelvic structures.     Syl participated in neuromuscular re-education activities to develop Coordination, Control and Down training for 25 minutes including: pelvic floor relaxation/bulging  training  Pt struggles to isolate TA initially but improves with practice. Tactile and verbal cues required for correct performance.     TrA 5 sec x 10 reps  TrA+BKFO/Marching x10 reps.         Home Exercises Provided and Patient Education Provided     Education provided:   - anatomy/physiology of pelvic floor  Discussed progression of plan of care with patient; educated pt in activity modification; reviewed HEP with pt. Pt demonstrated and verbalized understanding of all instruction and was not provided with a handout of HEP (see Patient Instructions).      Written Home Exercises Provided: Patient instructed to cont prior HEP.  Exercises were reviewed and Syl was able to demonstrate them prior to the end of the session.  Syl demonstrated good  understanding of the education provided.     See EMR under Patient Instructions for exercises provided 12/8/2020.    Assessment     Increased time spend on addressing soft tissue restrictions within the pelvic floor musculature intravaginally.  Also worked on pelvic floor muscle relaxation training. Initiated TrA activation and coordination to help decrease strain to pelvic structures.        Syl is progressing well towards her goals.   Pt prognosis is Excellent.     Pt will continue to benefit from skilled outpatient physical therapy to address the deficits listed in the problem list box on initial evaluation, provide pt/family education and to maximize pt's level of independence in the home and community environment.     Pt's spiritual, cultural and educational needs considered and pt agreeable to plan of care and goals.     Anticipated barriers to physical therapy: none    Goals:   Short Term Goals: 4 weeks   Pt to be able to delay the urge to urinate at least 5 minutes with a strong urge to urinate in order to make it to the bathroom without leaking.  Pt to voice understanding of the role that diet plays on urinary urgency.    Pt to demonstrate proper  diaphragmatic breathing to help with calming the nervous system in order to decrease pain and to improve abdominal wall musculature extensibility.   Pt to report a decrease in pain to no more than 4 at it's worst with pelvic floor muscle provocation.    Pt to demonstrate proper positioning on commode with breathing techniques to decrease strain with BM to enable pt to feel empty after BM.         Long Term Goals: 12 weeks   Pt to be discharged with home plan for carry over after discharge.    Pt to be able to delay the urge to urinate at least 10 minutes with a strong urge to urinate in order to make it to the bathroom without leaking.  Pt to report a decrease in pain to no more than 2 at it's worst with direct pelvic floor muscle provocation.      Plan     Plan of care Certification: 12/4/2020 to 2-26-21.     Outpatient Physical Therapy 1 times weekly for 12 weeks to include the following interventions: therapeutic exercises, therapeutic activity, neuromuscular re-education, gait training, manual therapy, modalities PRN, patient/family education, dry needling and self care/home management    Earlene Fu, PT

## 2020-12-22 NOTE — PATIENT INSTRUCTIONS
"Home Exercise Program: 12/22/2020    Abdominal Muscle Training:  Tighten your abdominal muscles without sucking in our pooching your belly.  Tighten and draw in your muscles.  Don't hold your breath!  It actually helps to exhale while your tighten.  "Blow out birthday candles."     Hold 5 seconds.  Repeat 10 times.  2 sets per day.                     "

## 2020-12-23 NOTE — PROGRESS NOTES
"  Pelvic Health Physical Therapy   Treatment Note     Name: Syl Elise  Clinic Number: [id number]    Therapy Diagnosis:   Encounter Diagnoses   Name Primary?    Muscle spasm Yes    Pelvic floor dysfunction      Physician: Hannah Mortensen NP    Visit Date: 12/29/2020    Physician Orders: PT Eval and Treat   Medical Diagnosis from Referral: myalgia of the pelvic floor, bladder pain, urinary frequency  Evaluation Date: 12/4/2020  Authorization Period Expiration: 12/31/2020  Plan of Care Expiration: 2-  Visit # / Visits authorized: 4/ 20  Cancelled Visits: 0  No Show Visits: 0    Time In: 1237   Time Out: 1330  Total Billable Time: 53  minutes    Precautions: Standard    Subjective     Pt reports: She was a little sore after her last visit but reports it was minimal.  She continues to be aware of pelvic floor clenching especially with increased stress.  She is trying to be more aware of how often she urinates. She is trying to delay the urge to urinate if she has been recently.  "I don't have that constant sense of urgency anymore."      She was compliant with home exercise program.  Response to previous treatment: no changes reported  Functional change: no changes reported    Pain: 0/10    Location: suprapubic region and vaginal vault     Objective   Pt verbally consents to intravaginal treatment today.  Signed consent form already on file.       Syl received the following manual therapy techniques: to develop flexibility and extensibility for 30 minutes including: trigger point/myofascial release of intravaginal pelvic floor muscles in layer 3 bilaterally and visceral mobilization of bladder neck and fascial around urethra     Not performed today:  STM to abdominal wall musculature focusing on suprapubic region to decrease strain to pelvic structures.     Therapeutic Exercise to develop  strength and endurance for 15 minutes including:   Posterior pelvic tilt 5 sec x 10 reps x2 sets "   Posterior pelvic tilt with bridge 5 sec x 10 reps   Clamshells 5 sec x 10 reps negro       Syl participated in neuromuscular re-education activities to develop Coordination, Control and Down training for 8 minutes including: pelvic floor relaxation/bulging training    Not performed today:  Pt struggles to isolate TA initially but improves with practice. Tactile and verbal cues required for correct performance.     TrA 5 sec x 10 reps  TrA+BKFO/Marching x10 reps.         Home Exercises Provided and Patient Education Provided     Education provided:   - anatomy/physiology of pelvic floor  Discussed progression of plan of care with patient; educated pt in activity modification; reviewed HEP with pt. Pt demonstrated and verbalized understanding of all instruction and was not provided with a handout of HEP (see Patient Instructions).      Written Home Exercises Provided: Patient instructed to cont prior HEP.  Exercises were reviewed and Syl was able to demonstrate them prior to the end of the session.  Syl demonstrated good  understanding of the education provided.     See EMR under Patient Instructions for exercises provided 12/8/2020.    Assessment     Increased time spend on addressing soft tissue restrictions within the pelvic floor musculature intravaginally.  Also worked on pelvic floor muscle relaxation training. Initiated hip strengthening exercises to help decrease stain to pelvic structures.        Syl is progressing well towards her goals.   Pt prognosis is Excellent.     Pt will continue to benefit from skilled outpatient physical therapy to address the deficits listed in the problem list box on initial evaluation, provide pt/family education and to maximize pt's level of independence in the home and community environment.     Pt's spiritual, cultural and educational needs considered and pt agreeable to plan of care and goals.     Anticipated barriers to physical therapy:  none    Goals:   Short Term Goals: 4 weeks   Pt to be able to delay the urge to urinate at least 5 minutes with a strong urge to urinate in order to make it to the bathroom without leaking.  Pt to voice understanding of the role that diet plays on urinary urgency.    Pt to demonstrate proper diaphragmatic breathing to help with calming the nervous system in order to decrease pain and to improve abdominal wall musculature extensibility.   Pt to report a decrease in pain to no more than 4 at it's worst with pelvic floor muscle provocation.    Pt to demonstrate proper positioning on commode with breathing techniques to decrease strain with BM to enable pt to feel empty after BM.         Long Term Goals: 12 weeks   Pt to be discharged with home plan for carry over after discharge.    Pt to be able to delay the urge to urinate at least 10 minutes with a strong urge to urinate in order to make it to the bathroom without leaking.  Pt to report a decrease in pain to no more than 2 at it's worst with direct pelvic floor muscle provocation.      Plan     Plan of care Certification: 12/4/2020 to 2-26-21.     Outpatient Physical Therapy 1 times weekly for 12 weeks to include the following interventions: therapeutic exercises, therapeutic activity, neuromuscular re-education, gait training, manual therapy, modalities PRN, patient/family education, dry needling and self care/home management    Earlene Fu, PT

## 2020-12-29 ENCOUNTER — CLINICAL SUPPORT (OUTPATIENT)
Dept: REHABILITATION | Facility: HOSPITAL | Age: 35
End: 2020-12-29
Payer: COMMERCIAL

## 2020-12-29 DIAGNOSIS — M62.89 PELVIC FLOOR DYSFUNCTION: ICD-10-CM

## 2020-12-29 DIAGNOSIS — M62.838 MUSCLE SPASM: Primary | ICD-10-CM

## 2020-12-29 PROCEDURE — 97112 NEUROMUSCULAR REEDUCATION: CPT | Mod: PO

## 2020-12-29 PROCEDURE — 97110 THERAPEUTIC EXERCISES: CPT | Mod: PO

## 2020-12-29 PROCEDURE — 97140 MANUAL THERAPY 1/> REGIONS: CPT | Mod: PO

## 2021-01-04 ENCOUNTER — CLINICAL SUPPORT (OUTPATIENT)
Dept: REHABILITATION | Facility: OTHER | Age: 36
End: 2021-01-04
Attending: NURSE PRACTITIONER
Payer: MEDICAID

## 2021-01-04 DIAGNOSIS — M62.89 PELVIC FLOOR DYSFUNCTION: ICD-10-CM

## 2021-01-04 DIAGNOSIS — M62.838 MUSCLE SPASM: Primary | ICD-10-CM

## 2021-01-04 PROCEDURE — 97140 MANUAL THERAPY 1/> REGIONS: CPT

## 2021-01-05 ENCOUNTER — PATIENT MESSAGE (OUTPATIENT)
Dept: INTERNAL MEDICINE | Facility: CLINIC | Age: 36
End: 2021-01-05

## 2021-01-13 ENCOUNTER — TELEPHONE (OUTPATIENT)
Dept: REHABILITATION | Facility: HOSPITAL | Age: 36
End: 2021-01-13

## 2021-01-20 ENCOUNTER — CLINICAL SUPPORT (OUTPATIENT)
Dept: REHABILITATION | Facility: OTHER | Age: 36
End: 2021-01-20
Attending: NURSE PRACTITIONER
Payer: MEDICAID

## 2021-01-20 DIAGNOSIS — M62.89 PELVIC FLOOR DYSFUNCTION: ICD-10-CM

## 2021-01-20 DIAGNOSIS — M62.838 MUSCLE SPASM: Primary | ICD-10-CM

## 2021-01-20 PROCEDURE — 97140 MANUAL THERAPY 1/> REGIONS: CPT

## 2021-01-20 PROCEDURE — 97110 THERAPEUTIC EXERCISES: CPT

## 2021-01-22 ENCOUNTER — OFFICE VISIT (OUTPATIENT)
Dept: GASTROENTEROLOGY | Facility: CLINIC | Age: 36
End: 2021-01-22
Attending: SURGERY
Payer: MEDICAID

## 2021-01-22 ENCOUNTER — PATIENT MESSAGE (OUTPATIENT)
Dept: INTERNAL MEDICINE | Facility: CLINIC | Age: 36
End: 2021-01-22

## 2021-01-22 VITALS
BODY MASS INDEX: 21.77 KG/M2 | HEART RATE: 76 BPM | HEIGHT: 60 IN | DIASTOLIC BLOOD PRESSURE: 72 MMHG | WEIGHT: 110.88 LBS | SYSTOLIC BLOOD PRESSURE: 110 MMHG

## 2021-01-22 DIAGNOSIS — K58.0 IRRITABLE BOWEL SYNDROME WITH DIARRHEA: Primary | ICD-10-CM

## 2021-01-22 PROCEDURE — 99999 PR PBB SHADOW E&M-EST. PATIENT-LVL III: CPT | Mod: PBBFAC,,, | Performed by: INTERNAL MEDICINE

## 2021-01-22 PROCEDURE — 99214 PR OFFICE/OUTPT VISIT, EST, LEVL IV, 30-39 MIN: ICD-10-PCS | Mod: S$PBB,,, | Performed by: INTERNAL MEDICINE

## 2021-01-22 PROCEDURE — 99999 PR PBB SHADOW E&M-EST. PATIENT-LVL III: ICD-10-PCS | Mod: PBBFAC,,, | Performed by: INTERNAL MEDICINE

## 2021-01-22 PROCEDURE — 99214 OFFICE O/P EST MOD 30 MIN: CPT | Mod: S$PBB,,, | Performed by: INTERNAL MEDICINE

## 2021-01-22 PROCEDURE — 99213 OFFICE O/P EST LOW 20 MIN: CPT | Mod: PBBFAC,PN | Performed by: INTERNAL MEDICINE

## 2021-01-28 ENCOUNTER — PATIENT MESSAGE (OUTPATIENT)
Dept: GASTROENTEROLOGY | Facility: CLINIC | Age: 36
End: 2021-01-28

## 2021-02-05 ENCOUNTER — CLINICAL SUPPORT (OUTPATIENT)
Dept: REHABILITATION | Facility: OTHER | Age: 36
End: 2021-02-05
Payer: MEDICAID

## 2021-02-05 DIAGNOSIS — M62.89 PELVIC FLOOR DYSFUNCTION: ICD-10-CM

## 2021-02-05 DIAGNOSIS — M62.838 MUSCLE SPASM: Primary | ICD-10-CM

## 2021-02-05 PROCEDURE — 97140 MANUAL THERAPY 1/> REGIONS: CPT

## 2021-02-05 PROCEDURE — 97110 THERAPEUTIC EXERCISES: CPT

## 2021-02-05 PROCEDURE — 97112 NEUROMUSCULAR REEDUCATION: CPT

## 2021-02-10 ENCOUNTER — PATIENT MESSAGE (OUTPATIENT)
Dept: OBSTETRICS AND GYNECOLOGY | Facility: CLINIC | Age: 36
End: 2021-02-10

## 2021-02-12 ENCOUNTER — CLINICAL SUPPORT (OUTPATIENT)
Dept: REHABILITATION | Facility: OTHER | Age: 36
End: 2021-02-12
Attending: NURSE PRACTITIONER
Payer: MEDICAID

## 2021-02-12 DIAGNOSIS — M62.89 PELVIC FLOOR DYSFUNCTION: ICD-10-CM

## 2021-02-12 DIAGNOSIS — M62.838 MUSCLE SPASM: Primary | ICD-10-CM

## 2021-02-12 PROCEDURE — 97140 MANUAL THERAPY 1/> REGIONS: CPT

## 2021-02-12 PROCEDURE — 97110 THERAPEUTIC EXERCISES: CPT

## 2021-02-15 ENCOUNTER — LAB VISIT (OUTPATIENT)
Dept: LAB | Facility: OTHER | Age: 36
End: 2021-02-15
Attending: OBSTETRICS & GYNECOLOGY
Payer: MEDICAID

## 2021-02-15 DIAGNOSIS — Z31.69 ENCOUNTER FOR PRECONCEPTION CONSULTATION: ICD-10-CM

## 2021-02-15 DIAGNOSIS — N93.9 ABNORMAL UTERINE BLEEDING: ICD-10-CM

## 2021-02-15 LAB
ESTRADIOL SERPL-MCNC: 104 PG/ML
FSH SERPL-ACNC: 5.8 MIU/ML
LH SERPL-ACNC: 3.2 MIU/ML
PROLACTIN SERPL IA-MCNC: 8.5 NG/ML (ref 5.2–26.5)
TSH SERPL DL<=0.005 MIU/L-ACNC: 1.88 UIU/ML (ref 0.4–4)

## 2021-02-15 PROCEDURE — 36415 COLL VENOUS BLD VENIPUNCTURE: CPT

## 2021-02-15 PROCEDURE — 86762 RUBELLA ANTIBODY: CPT

## 2021-02-15 PROCEDURE — 80074 ACUTE HEPATITIS PANEL: CPT

## 2021-02-15 PROCEDURE — 84146 ASSAY OF PROLACTIN: CPT

## 2021-02-15 PROCEDURE — 83001 ASSAY OF GONADOTROPIN (FSH): CPT

## 2021-02-15 PROCEDURE — 86592 SYPHILIS TEST NON-TREP QUAL: CPT

## 2021-02-15 PROCEDURE — 82670 ASSAY OF TOTAL ESTRADIOL: CPT

## 2021-02-15 PROCEDURE — 83002 ASSAY OF GONADOTROPIN (LH): CPT

## 2021-02-15 PROCEDURE — 86787 VARICELLA-ZOSTER ANTIBODY: CPT

## 2021-02-15 PROCEDURE — 84443 ASSAY THYROID STIM HORMONE: CPT

## 2021-02-17 LAB
HAV IGM SERPL QL IA: NEGATIVE
HBV CORE IGM SERPL QL IA: NEGATIVE
HBV SURFACE AG SERPL QL IA: NEGATIVE
HCV AB SERPL QL IA: NEGATIVE
RPR SER QL: NORMAL
RUBV IGG SER-ACNC: 15.8 IU/ML
RUBV IGG SER-IMP: REACTIVE

## 2021-02-18 LAB
VARICELLA INTERPRETATION: POSITIVE
VARICELLA ZOSTER IGG: 3 ISR (ref 0–0.9)

## 2021-02-19 ENCOUNTER — CLINICAL SUPPORT (OUTPATIENT)
Dept: REHABILITATION | Facility: OTHER | Age: 36
End: 2021-02-19
Attending: NURSE PRACTITIONER
Payer: MEDICAID

## 2021-02-19 DIAGNOSIS — M62.89 PELVIC FLOOR DYSFUNCTION: ICD-10-CM

## 2021-02-19 DIAGNOSIS — M62.838 MUSCLE SPASM: Primary | ICD-10-CM

## 2021-02-19 PROCEDURE — 97535 SELF CARE MNGMENT TRAINING: CPT

## 2021-02-19 PROCEDURE — 97140 MANUAL THERAPY 1/> REGIONS: CPT

## 2021-02-20 ENCOUNTER — PATIENT MESSAGE (OUTPATIENT)
Dept: OBSTETRICS AND GYNECOLOGY | Facility: CLINIC | Age: 36
End: 2021-02-20

## 2021-02-23 ENCOUNTER — PATIENT MESSAGE (OUTPATIENT)
Dept: OBSTETRICS AND GYNECOLOGY | Facility: CLINIC | Age: 36
End: 2021-02-23

## 2021-02-23 ENCOUNTER — CLINICAL SUPPORT (OUTPATIENT)
Dept: REHABILITATION | Facility: HOSPITAL | Age: 36
End: 2021-02-23
Payer: MEDICAID

## 2021-02-23 DIAGNOSIS — M62.838 MUSCLE SPASM: Primary | ICD-10-CM

## 2021-02-23 DIAGNOSIS — M62.89 PELVIC FLOOR DYSFUNCTION: ICD-10-CM

## 2021-02-23 PROCEDURE — 97140 MANUAL THERAPY 1/> REGIONS: CPT | Mod: PO

## 2021-02-23 PROCEDURE — 97535 SELF CARE MNGMENT TRAINING: CPT | Mod: PO

## 2021-02-23 PROCEDURE — 97110 THERAPEUTIC EXERCISES: CPT | Mod: PO

## 2021-02-24 ENCOUNTER — PATIENT MESSAGE (OUTPATIENT)
Dept: OBSTETRICS AND GYNECOLOGY | Facility: CLINIC | Age: 36
End: 2021-02-24

## 2021-02-24 DIAGNOSIS — N97.0 ANOVULATION: Primary | ICD-10-CM

## 2021-03-01 ENCOUNTER — LAB VISIT (OUTPATIENT)
Dept: LAB | Facility: OTHER | Age: 36
End: 2021-03-01
Attending: OBSTETRICS & GYNECOLOGY
Payer: MEDICAID

## 2021-03-01 DIAGNOSIS — N97.0 ANOVULATION: ICD-10-CM

## 2021-03-01 PROCEDURE — 36415 COLL VENOUS BLD VENIPUNCTURE: CPT

## 2021-03-01 PROCEDURE — 84144 ASSAY OF PROGESTERONE: CPT

## 2021-03-02 ENCOUNTER — PATIENT MESSAGE (OUTPATIENT)
Dept: REHABILITATION | Facility: HOSPITAL | Age: 36
End: 2021-03-02

## 2021-03-02 LAB — PROGEST SERPL-MCNC: 14.5 NG/ML

## 2021-03-09 ENCOUNTER — PATIENT MESSAGE (OUTPATIENT)
Dept: OBSTETRICS AND GYNECOLOGY | Facility: CLINIC | Age: 36
End: 2021-03-09

## 2021-03-09 ENCOUNTER — CLINICAL SUPPORT (OUTPATIENT)
Dept: REHABILITATION | Facility: HOSPITAL | Age: 36
End: 2021-03-09
Payer: MEDICAID

## 2021-03-09 ENCOUNTER — TELEPHONE (OUTPATIENT)
Dept: OBSTETRICS AND GYNECOLOGY | Facility: CLINIC | Age: 36
End: 2021-03-09

## 2021-03-09 DIAGNOSIS — N97.0 ANOVULATION: Primary | ICD-10-CM

## 2021-03-09 DIAGNOSIS — M62.838 MUSCLE SPASM: Primary | ICD-10-CM

## 2021-03-09 DIAGNOSIS — M62.89 PELVIC FLOOR DYSFUNCTION: ICD-10-CM

## 2021-03-09 PROCEDURE — 97140 MANUAL THERAPY 1/> REGIONS: CPT | Mod: PO

## 2021-03-09 PROCEDURE — 97112 NEUROMUSCULAR REEDUCATION: CPT | Mod: PO

## 2021-03-09 PROCEDURE — 97110 THERAPEUTIC EXERCISES: CPT | Mod: PO

## 2021-03-10 ENCOUNTER — PATIENT MESSAGE (OUTPATIENT)
Dept: INTERNAL MEDICINE | Facility: CLINIC | Age: 36
End: 2021-03-10

## 2021-03-11 ENCOUNTER — LAB VISIT (OUTPATIENT)
Dept: LAB | Facility: OTHER | Age: 36
End: 2021-03-11
Attending: OBSTETRICS & GYNECOLOGY
Payer: MEDICAID

## 2021-03-11 DIAGNOSIS — N97.0 ANOVULATION: ICD-10-CM

## 2021-03-11 LAB
ESTRADIOL SERPL-MCNC: 30 PG/ML
ESTRADIOL SERPL-MCNC: 30 PG/ML
FSH SERPL-ACNC: 8.2 MIU/ML
FSH SERPL-ACNC: 8.2 MIU/ML

## 2021-03-11 PROCEDURE — 82670 ASSAY OF TOTAL ESTRADIOL: CPT | Performed by: OBSTETRICS & GYNECOLOGY

## 2021-03-11 PROCEDURE — 83001 ASSAY OF GONADOTROPIN (FSH): CPT | Performed by: OBSTETRICS & GYNECOLOGY

## 2021-03-11 PROCEDURE — 36415 COLL VENOUS BLD VENIPUNCTURE: CPT | Performed by: OBSTETRICS & GYNECOLOGY

## 2021-03-15 ENCOUNTER — TELEPHONE (OUTPATIENT)
Dept: SLEEP MEDICINE | Facility: CLINIC | Age: 36
End: 2021-03-15

## 2021-03-15 ENCOUNTER — PATIENT MESSAGE (OUTPATIENT)
Dept: INTERNAL MEDICINE | Facility: CLINIC | Age: 36
End: 2021-03-15

## 2021-03-15 ENCOUNTER — PATIENT MESSAGE (OUTPATIENT)
Dept: SLEEP MEDICINE | Facility: CLINIC | Age: 36
End: 2021-03-15

## 2021-03-26 ENCOUNTER — CLINICAL SUPPORT (OUTPATIENT)
Dept: REHABILITATION | Facility: OTHER | Age: 36
End: 2021-03-26
Attending: NURSE PRACTITIONER
Payer: MEDICAID

## 2021-03-26 DIAGNOSIS — M62.89 PELVIC FLOOR DYSFUNCTION: ICD-10-CM

## 2021-03-26 DIAGNOSIS — M62.838 MUSCLE SPASM: Primary | ICD-10-CM

## 2021-03-26 PROCEDURE — 97110 THERAPEUTIC EXERCISES: CPT

## 2021-03-26 PROCEDURE — 97140 MANUAL THERAPY 1/> REGIONS: CPT

## 2021-04-01 ENCOUNTER — OFFICE VISIT (OUTPATIENT)
Dept: SLEEP MEDICINE | Facility: CLINIC | Age: 36
End: 2021-04-01
Payer: MEDICAID

## 2021-04-01 DIAGNOSIS — G47.33 OSA (OBSTRUCTIVE SLEEP APNEA): Primary | ICD-10-CM

## 2021-04-01 PROCEDURE — 99213 PR OFFICE/OUTPT VISIT, EST, LEVL III, 20-29 MIN: ICD-10-PCS | Mod: 95,,, | Performed by: PSYCHIATRY & NEUROLOGY

## 2021-04-01 PROCEDURE — 99213 OFFICE O/P EST LOW 20 MIN: CPT | Mod: 95,,, | Performed by: PSYCHIATRY & NEUROLOGY

## 2021-04-04 ENCOUNTER — PATIENT MESSAGE (OUTPATIENT)
Dept: SLEEP MEDICINE | Facility: CLINIC | Age: 36
End: 2021-04-04

## 2021-04-04 ENCOUNTER — PATIENT MESSAGE (OUTPATIENT)
Dept: INTERNAL MEDICINE | Facility: CLINIC | Age: 36
End: 2021-04-04

## 2021-04-07 ENCOUNTER — OFFICE VISIT (OUTPATIENT)
Dept: INTERNAL MEDICINE | Facility: CLINIC | Age: 36
End: 2021-04-07
Attending: INTERNAL MEDICINE
Payer: MEDICAID

## 2021-04-07 DIAGNOSIS — R76.0 ELEVATED EBV ANTIBODY TITER: ICD-10-CM

## 2021-04-07 DIAGNOSIS — R53.83 FATIGUE, UNSPECIFIED TYPE: Primary | ICD-10-CM

## 2021-04-07 PROCEDURE — 99214 OFFICE O/P EST MOD 30 MIN: CPT | Mod: 95,,, | Performed by: INTERNAL MEDICINE

## 2021-04-07 PROCEDURE — 99214 PR OFFICE/OUTPT VISIT, EST, LEVL IV, 30-39 MIN: ICD-10-PCS | Mod: 95,,, | Performed by: INTERNAL MEDICINE

## 2021-04-08 ENCOUNTER — TELEPHONE (OUTPATIENT)
Dept: SLEEP MEDICINE | Facility: CLINIC | Age: 36
End: 2021-04-08

## 2021-04-08 DIAGNOSIS — G47.30 SLEEP APNEA, UNSPECIFIED TYPE: Primary | ICD-10-CM

## 2021-04-09 ENCOUNTER — TELEPHONE (OUTPATIENT)
Dept: SLEEP MEDICINE | Facility: OTHER | Age: 36
End: 2021-04-09

## 2021-04-09 ENCOUNTER — LAB VISIT (OUTPATIENT)
Dept: LAB | Facility: HOSPITAL | Age: 36
End: 2021-04-09
Attending: INTERNAL MEDICINE
Payer: MEDICAID

## 2021-04-09 ENCOUNTER — TELEPHONE (OUTPATIENT)
Dept: SLEEP MEDICINE | Facility: CLINIC | Age: 36
End: 2021-04-09

## 2021-04-09 ENCOUNTER — PATIENT MESSAGE (OUTPATIENT)
Dept: SLEEP MEDICINE | Facility: CLINIC | Age: 36
End: 2021-04-09

## 2021-04-09 DIAGNOSIS — R53.83 FATIGUE, UNSPECIFIED TYPE: ICD-10-CM

## 2021-04-09 PROCEDURE — 86645 CMV ANTIBODY IGM: CPT | Performed by: INTERNAL MEDICINE

## 2021-04-09 PROCEDURE — 86644 CMV ANTIBODY: CPT | Performed by: INTERNAL MEDICINE

## 2021-04-09 PROCEDURE — 87799 DETECT AGENT NOS DNA QUANT: CPT | Performed by: INTERNAL MEDICINE

## 2021-04-12 ENCOUNTER — PATIENT MESSAGE (OUTPATIENT)
Dept: INTERNAL MEDICINE | Facility: CLINIC | Age: 36
End: 2021-04-12

## 2021-04-12 ENCOUNTER — CLINICAL SUPPORT (OUTPATIENT)
Dept: REHABILITATION | Facility: OTHER | Age: 36
End: 2021-04-12
Attending: NURSE PRACTITIONER
Payer: MEDICAID

## 2021-04-12 ENCOUNTER — TELEPHONE (OUTPATIENT)
Dept: SLEEP MEDICINE | Facility: OTHER | Age: 36
End: 2021-04-12

## 2021-04-12 DIAGNOSIS — M62.89 PELVIC FLOOR DYSFUNCTION: ICD-10-CM

## 2021-04-12 DIAGNOSIS — M62.838 MUSCLE SPASM: Primary | ICD-10-CM

## 2021-04-12 LAB — CMV IGM SERPL IA-ACNC: <8 AU/ML

## 2021-04-12 PROCEDURE — 97140 MANUAL THERAPY 1/> REGIONS: CPT

## 2021-04-12 PROCEDURE — 97110 THERAPEUTIC EXERCISES: CPT

## 2021-04-13 ENCOUNTER — TELEPHONE (OUTPATIENT)
Dept: SLEEP MEDICINE | Facility: CLINIC | Age: 36
End: 2021-04-13

## 2021-04-13 ENCOUNTER — PATIENT MESSAGE (OUTPATIENT)
Dept: SLEEP MEDICINE | Facility: CLINIC | Age: 36
End: 2021-04-13

## 2021-04-13 DIAGNOSIS — G47.33 OSA (OBSTRUCTIVE SLEEP APNEA): Primary | ICD-10-CM

## 2021-04-13 LAB
CMV DNA SERPL NAA+PROBE-ACNC: NORMAL IU/ML
CMV IGG SERPL QL IA: NORMAL
EBV DNA SERPL NAA+PROBE-ACNC: NORMAL IU/ML

## 2021-04-16 ENCOUNTER — TELEPHONE (OUTPATIENT)
Dept: SLEEP MEDICINE | Facility: CLINIC | Age: 36
End: 2021-04-16

## 2021-04-21 ENCOUNTER — PATIENT MESSAGE (OUTPATIENT)
Dept: SLEEP MEDICINE | Facility: CLINIC | Age: 36
End: 2021-04-21

## 2021-04-21 ENCOUNTER — TELEPHONE (OUTPATIENT)
Dept: SLEEP MEDICINE | Facility: CLINIC | Age: 36
End: 2021-04-21

## 2021-04-21 DIAGNOSIS — G47.33 OSA (OBSTRUCTIVE SLEEP APNEA): Primary | ICD-10-CM

## 2021-04-26 ENCOUNTER — PATIENT MESSAGE (OUTPATIENT)
Dept: RESEARCH | Facility: HOSPITAL | Age: 36
End: 2021-04-26

## 2021-04-27 ENCOUNTER — CLINICAL SUPPORT (OUTPATIENT)
Dept: REHABILITATION | Facility: HOSPITAL | Age: 36
End: 2021-04-27
Payer: MEDICAID

## 2021-04-27 ENCOUNTER — PATIENT MESSAGE (OUTPATIENT)
Dept: SLEEP MEDICINE | Facility: CLINIC | Age: 36
End: 2021-04-27

## 2021-04-27 DIAGNOSIS — M62.838 MUSCLE SPASM: Primary | ICD-10-CM

## 2021-04-27 DIAGNOSIS — M62.89 PELVIC FLOOR DYSFUNCTION: ICD-10-CM

## 2021-04-27 PROCEDURE — 97110 THERAPEUTIC EXERCISES: CPT | Mod: PO

## 2021-04-27 PROCEDURE — 97140 MANUAL THERAPY 1/> REGIONS: CPT | Mod: PO

## 2021-04-29 ENCOUNTER — OFFICE VISIT (OUTPATIENT)
Dept: GASTROENTEROLOGY | Facility: CLINIC | Age: 36
End: 2021-04-29
Payer: MEDICAID

## 2021-04-29 VITALS
HEART RATE: 86 BPM | WEIGHT: 110.25 LBS | HEIGHT: 60 IN | BODY MASS INDEX: 21.65 KG/M2 | SYSTOLIC BLOOD PRESSURE: 106 MMHG | DIASTOLIC BLOOD PRESSURE: 71 MMHG

## 2021-04-29 DIAGNOSIS — K58.0 IRRITABLE BOWEL SYNDROME WITH DIARRHEA: Primary | ICD-10-CM

## 2021-04-29 PROCEDURE — 99214 OFFICE O/P EST MOD 30 MIN: CPT | Mod: S$PBB,,, | Performed by: INTERNAL MEDICINE

## 2021-04-29 PROCEDURE — 99213 OFFICE O/P EST LOW 20 MIN: CPT | Mod: PBBFAC | Performed by: INTERNAL MEDICINE

## 2021-04-29 PROCEDURE — 99214 PR OFFICE/OUTPT VISIT, EST, LEVL IV, 30-39 MIN: ICD-10-PCS | Mod: S$PBB,,, | Performed by: INTERNAL MEDICINE

## 2021-04-29 PROCEDURE — 99999 PR PBB SHADOW E&M-EST. PATIENT-LVL III: ICD-10-PCS | Mod: PBBFAC,,, | Performed by: INTERNAL MEDICINE

## 2021-04-29 PROCEDURE — 99999 PR PBB SHADOW E&M-EST. PATIENT-LVL III: CPT | Mod: PBBFAC,,, | Performed by: INTERNAL MEDICINE

## 2021-04-30 ENCOUNTER — TELEPHONE (OUTPATIENT)
Dept: SLEEP MEDICINE | Facility: CLINIC | Age: 36
End: 2021-04-30

## 2021-04-30 ENCOUNTER — TELEPHONE (OUTPATIENT)
Dept: SLEEP MEDICINE | Facility: OTHER | Age: 36
End: 2021-04-30

## 2021-05-04 ENCOUNTER — PATIENT MESSAGE (OUTPATIENT)
Dept: SLEEP MEDICINE | Facility: CLINIC | Age: 36
End: 2021-05-04

## 2021-05-06 ENCOUNTER — TELEPHONE (OUTPATIENT)
Dept: SLEEP MEDICINE | Facility: CLINIC | Age: 36
End: 2021-05-06

## 2021-05-06 DIAGNOSIS — G47.33 OSA (OBSTRUCTIVE SLEEP APNEA): Primary | ICD-10-CM

## 2021-05-10 ENCOUNTER — TELEPHONE (OUTPATIENT)
Dept: SLEEP MEDICINE | Facility: OTHER | Age: 36
End: 2021-05-10

## 2021-05-12 ENCOUNTER — TELEPHONE (OUTPATIENT)
Dept: SLEEP MEDICINE | Facility: OTHER | Age: 36
End: 2021-05-12

## 2021-05-19 ENCOUNTER — PATIENT MESSAGE (OUTPATIENT)
Dept: GASTROENTEROLOGY | Facility: CLINIC | Age: 36
End: 2021-05-19

## 2021-05-20 ENCOUNTER — TELEPHONE (OUTPATIENT)
Dept: SLEEP MEDICINE | Facility: OTHER | Age: 36
End: 2021-05-20

## 2021-05-24 ENCOUNTER — HOSPITAL ENCOUNTER (OUTPATIENT)
Dept: SLEEP MEDICINE | Facility: OTHER | Age: 36
Discharge: HOME | End: 2021-05-24
Attending: PSYCHIATRY & NEUROLOGY
Payer: MEDICAID

## 2021-05-24 ENCOUNTER — TELEPHONE (OUTPATIENT)
Dept: SLEEP MEDICINE | Facility: OTHER | Age: 36
End: 2021-05-24

## 2021-05-24 ENCOUNTER — PATIENT MESSAGE (OUTPATIENT)
Dept: SLEEP MEDICINE | Facility: OTHER | Age: 36
End: 2021-05-24

## 2021-05-24 ENCOUNTER — PATIENT MESSAGE (OUTPATIENT)
Dept: SLEEP MEDICINE | Facility: CLINIC | Age: 36
End: 2021-05-24

## 2021-05-24 DIAGNOSIS — G47.31 COMPLEX SLEEP APNEA SYNDROME: ICD-10-CM

## 2021-05-24 DIAGNOSIS — G47.33 OSA (OBSTRUCTIVE SLEEP APNEA): ICD-10-CM

## 2021-05-24 PROCEDURE — 95811 POLYSOM 6/>YRS CPAP 4/> PARM: CPT | Mod: 26,,, | Performed by: PSYCHIATRY & NEUROLOGY

## 2021-05-24 PROCEDURE — 95811 POLYSOM 6/>YRS CPAP 4/> PARM: CPT

## 2021-05-24 PROCEDURE — 95811 PR POLYSOMNOGRAPHY W/CPAP: ICD-10-PCS | Mod: 26,,, | Performed by: PSYCHIATRY & NEUROLOGY

## 2021-05-27 ENCOUNTER — PATIENT MESSAGE (OUTPATIENT)
Dept: SLEEP MEDICINE | Facility: CLINIC | Age: 36
End: 2021-05-27

## 2021-06-09 ENCOUNTER — PATIENT MESSAGE (OUTPATIENT)
Dept: SLEEP MEDICINE | Facility: CLINIC | Age: 36
End: 2021-06-09

## 2021-06-10 ENCOUNTER — PATIENT MESSAGE (OUTPATIENT)
Dept: SLEEP MEDICINE | Facility: CLINIC | Age: 36
End: 2021-06-10

## 2021-06-10 ENCOUNTER — OFFICE VISIT (OUTPATIENT)
Dept: SLEEP MEDICINE | Facility: CLINIC | Age: 36
End: 2021-06-10
Payer: MEDICAID

## 2021-06-10 DIAGNOSIS — G47.31 COMPLEX SLEEP APNEA SYNDROME: Primary | ICD-10-CM

## 2021-06-10 DIAGNOSIS — G47.33 OSA (OBSTRUCTIVE SLEEP APNEA): ICD-10-CM

## 2021-06-10 PROCEDURE — 99214 OFFICE O/P EST MOD 30 MIN: CPT | Mod: 95,,, | Performed by: PSYCHIATRY & NEUROLOGY

## 2021-06-10 PROCEDURE — 99214 PR OFFICE/OUTPT VISIT, EST, LEVL IV, 30-39 MIN: ICD-10-PCS | Mod: 95,,, | Performed by: PSYCHIATRY & NEUROLOGY

## 2021-06-11 ENCOUNTER — PATIENT MESSAGE (OUTPATIENT)
Dept: SLEEP MEDICINE | Facility: OTHER | Age: 36
End: 2021-06-11

## 2021-06-11 ENCOUNTER — PATIENT MESSAGE (OUTPATIENT)
Dept: SLEEP MEDICINE | Facility: CLINIC | Age: 36
End: 2021-06-11

## 2021-06-11 ENCOUNTER — TELEPHONE (OUTPATIENT)
Dept: SLEEP MEDICINE | Facility: CLINIC | Age: 36
End: 2021-06-11

## 2021-06-11 ENCOUNTER — TELEPHONE (OUTPATIENT)
Dept: SLEEP MEDICINE | Facility: OTHER | Age: 36
End: 2021-06-11

## 2021-06-11 ENCOUNTER — TELEPHONE (OUTPATIENT)
Dept: OTOLARYNGOLOGY | Facility: CLINIC | Age: 36
End: 2021-06-11

## 2021-06-11 DIAGNOSIS — G47.33 OSA (OBSTRUCTIVE SLEEP APNEA): Primary | ICD-10-CM

## 2021-06-14 ENCOUNTER — TELEPHONE (OUTPATIENT)
Dept: SLEEP MEDICINE | Facility: CLINIC | Age: 36
End: 2021-06-14

## 2021-06-14 DIAGNOSIS — G47.33 OSA (OBSTRUCTIVE SLEEP APNEA): Primary | ICD-10-CM

## 2021-06-15 ENCOUNTER — TELEPHONE (OUTPATIENT)
Dept: SLEEP MEDICINE | Facility: OTHER | Age: 36
End: 2021-06-15

## 2021-06-16 ENCOUNTER — HOSPITAL ENCOUNTER (OUTPATIENT)
Dept: SLEEP MEDICINE | Facility: OTHER | Age: 36
Discharge: HOME | End: 2021-06-16
Attending: PSYCHIATRY & NEUROLOGY
Payer: MEDICAID

## 2021-06-16 DIAGNOSIS — G47.31 COMPLEX SLEEP APNEA SYNDROME: ICD-10-CM

## 2021-06-16 PROCEDURE — 95811 POLYSOM 6/>YRS CPAP 4/> PARM: CPT

## 2021-06-16 PROCEDURE — 95811 POLYSOM 6/>YRS CPAP 4/> PARM: CPT | Mod: 26,,, | Performed by: PSYCHIATRY & NEUROLOGY

## 2021-06-16 PROCEDURE — 95811 PR POLYSOMNOGRAPHY W/CPAP: ICD-10-PCS | Mod: 26,,, | Performed by: PSYCHIATRY & NEUROLOGY

## 2021-06-21 ENCOUNTER — OFFICE VISIT (OUTPATIENT)
Dept: OTOLARYNGOLOGY | Facility: CLINIC | Age: 36
End: 2021-06-21
Payer: MEDICAID

## 2021-06-21 VITALS
HEIGHT: 60 IN | WEIGHT: 109 LBS | SYSTOLIC BLOOD PRESSURE: 124 MMHG | BODY MASS INDEX: 21.4 KG/M2 | HEART RATE: 89 BPM | DIASTOLIC BLOOD PRESSURE: 82 MMHG | TEMPERATURE: 98 F

## 2021-06-21 DIAGNOSIS — J30.9 ALLERGIC RHINITIS, UNSPECIFIED SEASONALITY, UNSPECIFIED TRIGGER: Primary | ICD-10-CM

## 2021-06-21 DIAGNOSIS — G47.33 OSA (OBSTRUCTIVE SLEEP APNEA): ICD-10-CM

## 2021-06-21 PROCEDURE — 99204 PR OFFICE/OUTPT VISIT, NEW, LEVL IV, 45-59 MIN: ICD-10-PCS | Mod: S$GLB,,, | Performed by: OTOLARYNGOLOGY

## 2021-06-21 PROCEDURE — 99204 OFFICE O/P NEW MOD 45 MIN: CPT | Mod: S$GLB,,, | Performed by: OTOLARYNGOLOGY

## 2021-06-21 RX ORDER — IPRATROPIUM BROMIDE 21 UG/1
2 SPRAY, METERED NASAL 2 TIMES DAILY
Qty: 30 ML | Refills: 2 | Status: SHIPPED | OUTPATIENT
Start: 2021-06-21

## 2021-06-24 ENCOUNTER — PATIENT MESSAGE (OUTPATIENT)
Dept: SLEEP MEDICINE | Facility: CLINIC | Age: 36
End: 2021-06-24

## 2021-06-25 ENCOUNTER — PATIENT MESSAGE (OUTPATIENT)
Dept: SLEEP MEDICINE | Facility: CLINIC | Age: 36
End: 2021-06-25

## 2021-06-30 ENCOUNTER — PATIENT MESSAGE (OUTPATIENT)
Dept: SLEEP MEDICINE | Facility: CLINIC | Age: 36
End: 2021-06-30

## 2021-07-14 ENCOUNTER — PATIENT MESSAGE (OUTPATIENT)
Dept: REHABILITATION | Facility: HOSPITAL | Age: 36
End: 2021-07-14

## 2021-07-21 ENCOUNTER — PATIENT MESSAGE (OUTPATIENT)
Dept: INTERNAL MEDICINE | Facility: CLINIC | Age: 36
End: 2021-07-21

## 2021-07-21 DIAGNOSIS — R53.83 FATIGUE, UNSPECIFIED TYPE: Primary | ICD-10-CM

## 2021-07-23 ENCOUNTER — PATIENT MESSAGE (OUTPATIENT)
Dept: INTERNAL MEDICINE | Facility: CLINIC | Age: 36
End: 2021-07-23

## 2021-07-23 DIAGNOSIS — Z87.39 HISTORY OF OSTEOPENIA: ICD-10-CM

## 2021-07-23 DIAGNOSIS — Z00.00 ANNUAL PHYSICAL EXAM: Primary | ICD-10-CM

## 2021-07-23 DIAGNOSIS — R30.0 DYSURIA: ICD-10-CM

## 2021-07-26 ENCOUNTER — PATIENT MESSAGE (OUTPATIENT)
Dept: OBSTETRICS AND GYNECOLOGY | Facility: CLINIC | Age: 36
End: 2021-07-26

## 2021-07-27 ENCOUNTER — HOSPITAL ENCOUNTER (OUTPATIENT)
Dept: RADIOLOGY | Facility: OTHER | Age: 36
Discharge: HOME | End: 2021-07-27
Attending: INTERNAL MEDICINE
Payer: MEDICAID

## 2021-07-27 DIAGNOSIS — Z00.00 ANNUAL PHYSICAL EXAM: ICD-10-CM

## 2021-07-27 DIAGNOSIS — Z87.39 HISTORY OF OSTEOPENIA: ICD-10-CM

## 2021-07-27 PROCEDURE — 77080 DEXA BONE DENSITY SPINE HIP: ICD-10-PCS | Mod: 26,,, | Performed by: RADIOLOGY

## 2021-07-27 PROCEDURE — 77080 DXA BONE DENSITY AXIAL: CPT | Mod: 26,,, | Performed by: RADIOLOGY

## 2021-07-27 PROCEDURE — 77080 DXA BONE DENSITY AXIAL: CPT | Mod: TC

## 2021-07-29 ENCOUNTER — LAB VISIT (OUTPATIENT)
Dept: LAB | Facility: OTHER | Age: 36
End: 2021-07-29
Attending: INTERNAL MEDICINE
Payer: MEDICAID

## 2021-07-29 DIAGNOSIS — R53.83 FATIGUE, UNSPECIFIED TYPE: ICD-10-CM

## 2021-07-29 DIAGNOSIS — Z00.00 ANNUAL PHYSICAL EXAM: ICD-10-CM

## 2021-07-29 LAB
25(OH)D3+25(OH)D2 SERPL-MCNC: 36 NG/ML (ref 30–96)
ALBUMIN SERPL BCP-MCNC: 3.9 G/DL (ref 3.5–5.2)
ALP SERPL-CCNC: 43 U/L (ref 55–135)
ALT SERPL W/O P-5'-P-CCNC: 16 U/L (ref 10–44)
ANION GAP SERPL CALC-SCNC: 7 MMOL/L (ref 8–16)
AST SERPL-CCNC: 18 U/L (ref 10–40)
BASOPHILS # BLD AUTO: 0.05 K/UL (ref 0–0.2)
BASOPHILS NFR BLD: 0.8 % (ref 0–1.9)
BILIRUB SERPL-MCNC: 0.4 MG/DL (ref 0.1–1)
BUN SERPL-MCNC: 17 MG/DL (ref 6–20)
CALCIUM SERPL-MCNC: 8.6 MG/DL (ref 8.7–10.5)
CHLORIDE SERPL-SCNC: 106 MMOL/L (ref 95–110)
CO2 SERPL-SCNC: 22 MMOL/L (ref 23–29)
CREAT SERPL-MCNC: 0.7 MG/DL (ref 0.5–1.4)
DIFFERENTIAL METHOD: NORMAL
EOSINOPHIL # BLD AUTO: 0.1 K/UL (ref 0–0.5)
EOSINOPHIL NFR BLD: 1.8 % (ref 0–8)
ERYTHROCYTE [DISTWIDTH] IN BLOOD BY AUTOMATED COUNT: 12 % (ref 11.5–14.5)
EST. GFR  (AFRICAN AMERICAN): >60 ML/MIN/1.73 M^2
EST. GFR  (NON AFRICAN AMERICAN): >60 ML/MIN/1.73 M^2
GLUCOSE SERPL-MCNC: 87 MG/DL (ref 70–110)
HCT VFR BLD AUTO: 40 % (ref 37–48.5)
HGB BLD-MCNC: 13.4 G/DL (ref 12–16)
IMM GRANULOCYTES # BLD AUTO: 0.01 K/UL (ref 0–0.04)
IMM GRANULOCYTES NFR BLD AUTO: 0.2 % (ref 0–0.5)
LYMPHOCYTES # BLD AUTO: 2 K/UL (ref 1–4.8)
LYMPHOCYTES NFR BLD: 32.4 % (ref 18–48)
MCH RBC QN AUTO: 30.4 PG (ref 27–31)
MCHC RBC AUTO-ENTMCNC: 33.5 G/DL (ref 32–36)
MCV RBC AUTO: 91 FL (ref 82–98)
MONOCYTES # BLD AUTO: 0.5 K/UL (ref 0.3–1)
MONOCYTES NFR BLD: 7.6 % (ref 4–15)
NEUTROPHILS # BLD AUTO: 3.5 K/UL (ref 1.8–7.7)
NEUTROPHILS NFR BLD: 57.2 % (ref 38–73)
NRBC BLD-RTO: 0 /100 WBC
PLATELET # BLD AUTO: 290 K/UL (ref 150–450)
PMV BLD AUTO: 10 FL (ref 9.2–12.9)
POTASSIUM SERPL-SCNC: 3.6 MMOL/L (ref 3.5–5.1)
PROT SERPL-MCNC: 6.9 G/DL (ref 6–8.4)
RBC # BLD AUTO: 4.41 M/UL (ref 4–5.4)
SODIUM SERPL-SCNC: 135 MMOL/L (ref 136–145)
WBC # BLD AUTO: 6.15 K/UL (ref 3.9–12.7)

## 2021-07-29 PROCEDURE — 82306 VITAMIN D 25 HYDROXY: CPT | Performed by: INTERNAL MEDICINE

## 2021-07-29 PROCEDURE — 86663 EPSTEIN-BARR ANTIBODY: CPT | Performed by: INTERNAL MEDICINE

## 2021-07-29 PROCEDURE — 36415 COLL VENOUS BLD VENIPUNCTURE: CPT | Performed by: INTERNAL MEDICINE

## 2021-07-29 PROCEDURE — 85025 COMPLETE CBC W/AUTO DIFF WBC: CPT | Performed by: INTERNAL MEDICINE

## 2021-07-29 PROCEDURE — 80053 COMPREHEN METABOLIC PANEL: CPT | Performed by: INTERNAL MEDICINE

## 2021-08-02 LAB
EBV EA IGG SER-ACNC: 19.6 U/ML
EBV NA IGG SER-ACNC: >600 U/ML
EBV VCA IGG SER-ACNC: 273 U/ML
EBV VCA IGM SER-ACNC: <10 U/ML

## 2021-08-05 ENCOUNTER — TELEPHONE (OUTPATIENT)
Dept: OPTOMETRY | Facility: CLINIC | Age: 36
End: 2021-08-05

## 2021-08-05 ENCOUNTER — PATIENT MESSAGE (OUTPATIENT)
Dept: SLEEP MEDICINE | Facility: CLINIC | Age: 36
End: 2021-08-05

## 2021-08-05 ENCOUNTER — OFFICE VISIT (OUTPATIENT)
Dept: INTERNAL MEDICINE | Facility: CLINIC | Age: 36
End: 2021-08-05
Attending: INTERNAL MEDICINE
Payer: MEDICAID

## 2021-08-05 ENCOUNTER — PATIENT MESSAGE (OUTPATIENT)
Dept: INTERNAL MEDICINE | Facility: CLINIC | Age: 36
End: 2021-08-05

## 2021-08-05 ENCOUNTER — OFFICE VISIT (OUTPATIENT)
Dept: SLEEP MEDICINE | Facility: CLINIC | Age: 36
End: 2021-08-05
Payer: MEDICAID

## 2021-08-05 VITALS
HEART RATE: 88 BPM | HEIGHT: 61 IN | DIASTOLIC BLOOD PRESSURE: 70 MMHG | OXYGEN SATURATION: 99 % | SYSTOLIC BLOOD PRESSURE: 120 MMHG | WEIGHT: 109.38 LBS | BODY MASS INDEX: 20.65 KG/M2

## 2021-08-05 DIAGNOSIS — Z97.3 WEARS GLASSES: ICD-10-CM

## 2021-08-05 DIAGNOSIS — Z86.69 HISTORY OF STRABISMUS: ICD-10-CM

## 2021-08-05 DIAGNOSIS — E55.9 VITAMIN D DEFICIENCY: ICD-10-CM

## 2021-08-05 DIAGNOSIS — Z87.312 HISTORY OF STRESS FRACTURE: ICD-10-CM

## 2021-08-05 DIAGNOSIS — M85.80 LOW BONE MASS: ICD-10-CM

## 2021-08-05 DIAGNOSIS — G47.33 OSA (OBSTRUCTIVE SLEEP APNEA): ICD-10-CM

## 2021-08-05 DIAGNOSIS — Z11.4 SCREENING FOR HIV (HUMAN IMMUNODEFICIENCY VIRUS): ICD-10-CM

## 2021-08-05 DIAGNOSIS — G47.31 COMPLEX SLEEP APNEA SYNDROME: ICD-10-CM

## 2021-08-05 DIAGNOSIS — G47.33 OSA (OBSTRUCTIVE SLEEP APNEA): Primary | ICD-10-CM

## 2021-08-05 DIAGNOSIS — Z00.00 ANNUAL PHYSICAL EXAM: Primary | ICD-10-CM

## 2021-08-05 PROCEDURE — 99214 OFFICE O/P EST MOD 30 MIN: CPT | Mod: PBBFAC | Performed by: INTERNAL MEDICINE

## 2021-08-05 PROCEDURE — 99499 UNLISTED E&M SERVICE: CPT | Mod: 95,,, | Performed by: PSYCHIATRY & NEUROLOGY

## 2021-08-05 PROCEDURE — 99395 PREV VISIT EST AGE 18-39: CPT | Mod: S$PBB,,, | Performed by: INTERNAL MEDICINE

## 2021-08-05 PROCEDURE — 99999 PR PBB SHADOW E&M-EST. PATIENT-LVL IV: CPT | Mod: PBBFAC,,, | Performed by: INTERNAL MEDICINE

## 2021-08-05 PROCEDURE — 99499 NO LOS: ICD-10-PCS | Mod: 95,,, | Performed by: PSYCHIATRY & NEUROLOGY

## 2021-08-05 PROCEDURE — 99395 PR PREVENTIVE VISIT,EST,18-39: ICD-10-PCS | Mod: S$PBB,,, | Performed by: INTERNAL MEDICINE

## 2021-08-05 PROCEDURE — 99999 PR PBB SHADOW E&M-EST. PATIENT-LVL IV: ICD-10-PCS | Mod: PBBFAC,,, | Performed by: INTERNAL MEDICINE

## 2021-08-05 RX ORDER — FERROUS SULFATE, DRIED 160(50) MG
1 TABLET, EXTENDED RELEASE ORAL 2 TIMES DAILY WITH MEALS
COMMUNITY

## 2021-08-18 ENCOUNTER — OFFICE VISIT (OUTPATIENT)
Dept: OBSTETRICS AND GYNECOLOGY | Facility: CLINIC | Age: 36
End: 2021-08-18
Payer: MEDICAID

## 2021-08-18 VITALS
HEIGHT: 61 IN | DIASTOLIC BLOOD PRESSURE: 70 MMHG | SYSTOLIC BLOOD PRESSURE: 114 MMHG | WEIGHT: 106.5 LBS | BODY MASS INDEX: 20.11 KG/M2

## 2021-08-18 DIAGNOSIS — N39.0 RECURRENT UTI: ICD-10-CM

## 2021-08-18 DIAGNOSIS — Z71.85 HPV VACCINE COUNSELING: ICD-10-CM

## 2021-08-18 DIAGNOSIS — Z01.411 ENCOUNTER FOR WELL WOMAN EXAM WITH ABNORMAL FINDINGS: ICD-10-CM

## 2021-08-18 DIAGNOSIS — Z01.419 WELL WOMAN EXAM WITH ROUTINE GYNECOLOGICAL EXAM: Primary | ICD-10-CM

## 2021-08-18 DIAGNOSIS — Z30.012 EMERGENCY CONTRACEPTION: ICD-10-CM

## 2021-08-18 DIAGNOSIS — Z30.09 ENCOUNTER FOR OTHER GENERAL COUNSELING OR ADVICE ON CONTRACEPTION: ICD-10-CM

## 2021-08-18 PROCEDURE — 87591 N.GONORRHOEAE DNA AMP PROB: CPT | Performed by: OBSTETRICS & GYNECOLOGY

## 2021-08-18 PROCEDURE — 99213 OFFICE O/P EST LOW 20 MIN: CPT | Mod: PBBFAC | Performed by: OBSTETRICS & GYNECOLOGY

## 2021-08-18 PROCEDURE — 87086 URINE CULTURE/COLONY COUNT: CPT | Performed by: OBSTETRICS & GYNECOLOGY

## 2021-08-18 PROCEDURE — 99395 PREV VISIT EST AGE 18-39: CPT | Mod: S$PBB,,, | Performed by: OBSTETRICS & GYNECOLOGY

## 2021-08-18 PROCEDURE — 87491 CHLMYD TRACH DNA AMP PROBE: CPT | Performed by: OBSTETRICS & GYNECOLOGY

## 2021-08-18 PROCEDURE — 99395 PR PREVENTIVE VISIT,EST,18-39: ICD-10-PCS | Mod: S$PBB,,, | Performed by: OBSTETRICS & GYNECOLOGY

## 2021-08-18 PROCEDURE — 99999 PR PBB SHADOW E&M-EST. PATIENT-LVL III: CPT | Mod: PBBFAC,,, | Performed by: OBSTETRICS & GYNECOLOGY

## 2021-08-18 PROCEDURE — 87088 URINE BACTERIA CULTURE: CPT | Performed by: OBSTETRICS & GYNECOLOGY

## 2021-08-18 PROCEDURE — 99999 PR PBB SHADOW E&M-EST. PATIENT-LVL III: ICD-10-PCS | Mod: PBBFAC,,, | Performed by: OBSTETRICS & GYNECOLOGY

## 2021-08-18 RX ORDER — ULIPRISTAL ACETATE 30 MG/1
30 TABLET ORAL ONCE
Qty: 1 TABLET | Refills: 3 | Status: SHIPPED | OUTPATIENT
Start: 2021-08-18 | End: 2021-08-18

## 2021-08-19 ENCOUNTER — OFFICE VISIT (OUTPATIENT)
Dept: SLEEP MEDICINE | Facility: CLINIC | Age: 36
End: 2021-08-19
Payer: MEDICAID

## 2021-08-19 ENCOUNTER — PATIENT MESSAGE (OUTPATIENT)
Dept: INTERNAL MEDICINE | Facility: CLINIC | Age: 36
End: 2021-08-19

## 2021-08-19 DIAGNOSIS — G47.30 SLEEP APNEA, UNSPECIFIED TYPE: ICD-10-CM

## 2021-08-19 DIAGNOSIS — G47.33 OSA (OBSTRUCTIVE SLEEP APNEA): Primary | ICD-10-CM

## 2021-08-19 DIAGNOSIS — G47.31 COMPLEX SLEEP APNEA SYNDROME: ICD-10-CM

## 2021-08-19 PROCEDURE — 99214 OFFICE O/P EST MOD 30 MIN: CPT | Mod: 95,,, | Performed by: PSYCHIATRY & NEUROLOGY

## 2021-08-19 PROCEDURE — 99214 PR OFFICE/OUTPT VISIT, EST, LEVL IV, 30-39 MIN: ICD-10-PCS | Mod: 95,,, | Performed by: PSYCHIATRY & NEUROLOGY

## 2021-08-20 ENCOUNTER — OFFICE VISIT (OUTPATIENT)
Dept: ENDOCRINOLOGY | Facility: CLINIC | Age: 36
End: 2021-08-20
Payer: MEDICAID

## 2021-08-20 ENCOUNTER — PATIENT MESSAGE (OUTPATIENT)
Dept: ENDOCRINOLOGY | Facility: CLINIC | Age: 36
End: 2021-08-20

## 2021-08-20 VITALS
SYSTOLIC BLOOD PRESSURE: 115 MMHG | DIASTOLIC BLOOD PRESSURE: 70 MMHG | BODY MASS INDEX: 20.01 KG/M2 | HEIGHT: 61 IN | HEART RATE: 79 BPM | WEIGHT: 106 LBS

## 2021-08-20 DIAGNOSIS — E55.9 VITAMIN D INSUFFICIENCY: ICD-10-CM

## 2021-08-20 DIAGNOSIS — M85.80 LOW BONE MASS: ICD-10-CM

## 2021-08-20 DIAGNOSIS — Z87.312 HISTORY OF STRESS FRACTURE: ICD-10-CM

## 2021-08-20 LAB
BACTERIA UR CULT: ABNORMAL
C TRACH DNA SPEC QL NAA+PROBE: NOT DETECTED
N GONORRHOEA DNA SPEC QL NAA+PROBE: NOT DETECTED

## 2021-08-20 PROCEDURE — 99204 OFFICE O/P NEW MOD 45 MIN: CPT | Mod: S$GLB,,, | Performed by: INTERNAL MEDICINE

## 2021-08-20 PROCEDURE — 99204 PR OFFICE/OUTPT VISIT, NEW, LEVL IV, 45-59 MIN: ICD-10-PCS | Mod: S$GLB,,, | Performed by: INTERNAL MEDICINE

## 2021-08-23 ENCOUNTER — PATIENT MESSAGE (OUTPATIENT)
Dept: INTERNAL MEDICINE | Facility: CLINIC | Age: 36
End: 2021-08-23

## 2021-08-27 ENCOUNTER — OFFICE VISIT (OUTPATIENT)
Dept: OPTOMETRY | Facility: CLINIC | Age: 36
End: 2021-08-27
Payer: MEDICAID

## 2021-08-27 DIAGNOSIS — H52.13 MYOPIA WITH ASTIGMATISM, BILATERAL: ICD-10-CM

## 2021-08-27 DIAGNOSIS — Z46.0 FITTING AND ADJUSTMENT OF SPECTACLES AND CONTACT LENSES: ICD-10-CM

## 2021-08-27 DIAGNOSIS — Z97.3 WEARS GLASSES: ICD-10-CM

## 2021-08-27 DIAGNOSIS — Z98.890 HISTORY OF STRABISMUS SURGERY: ICD-10-CM

## 2021-08-27 DIAGNOSIS — Z97.3 WEARS CONTACT LENSES: Primary | ICD-10-CM

## 2021-08-27 DIAGNOSIS — H52.203 MYOPIA WITH ASTIGMATISM, BILATERAL: ICD-10-CM

## 2021-08-27 DIAGNOSIS — Z86.69 HISTORY OF STRABISMUS: ICD-10-CM

## 2021-08-27 DIAGNOSIS — H53.9 VISUAL DISTURBANCES: Primary | ICD-10-CM

## 2021-08-27 PROCEDURE — 99999 PR PBB SHADOW E&M-EST. PATIENT-LVL II: ICD-10-PCS | Mod: PBBFAC,,, | Performed by: OPTOMETRIST

## 2021-08-27 PROCEDURE — 99999 PR PBB SHADOW E&M-EST. PATIENT-LVL II: CPT | Mod: PBBFAC,,, | Performed by: OPTOMETRIST

## 2021-08-27 PROCEDURE — 92014 PR EYE EXAM, EST PATIENT,COMPREHESV: ICD-10-PCS | Mod: S$PBB,,, | Performed by: OPTOMETRIST

## 2021-08-27 PROCEDURE — 92015 PR REFRACTION: ICD-10-PCS | Mod: ,,, | Performed by: OPTOMETRIST

## 2021-08-27 PROCEDURE — 92015 DETERMINE REFRACTIVE STATE: CPT | Mod: ,,, | Performed by: OPTOMETRIST

## 2021-08-27 PROCEDURE — 99212 OFFICE O/P EST SF 10 MIN: CPT | Mod: PBBFAC,PO | Performed by: OPTOMETRIST

## 2021-08-27 PROCEDURE — 92310 CONTACT LENS FITTING OU: CPT | Mod: CSM,,, | Performed by: OPTOMETRIST

## 2021-08-27 PROCEDURE — 92310 PR CONTACT LENS FITTING (NO CHANGE): ICD-10-PCS | Mod: CSM,,, | Performed by: OPTOMETRIST

## 2021-08-27 PROCEDURE — 99212 OFFICE O/P EST SF 10 MIN: CPT | Mod: PBBFAC,27,PO | Performed by: OPTOMETRIST

## 2021-08-27 PROCEDURE — 92014 COMPRE OPH EXAM EST PT 1/>: CPT | Mod: S$PBB,,, | Performed by: OPTOMETRIST

## 2021-09-07 ENCOUNTER — PATIENT MESSAGE (OUTPATIENT)
Dept: OBSTETRICS AND GYNECOLOGY | Facility: CLINIC | Age: 36
End: 2021-09-07

## 2021-09-13 ENCOUNTER — PATIENT MESSAGE (OUTPATIENT)
Dept: SLEEP MEDICINE | Facility: CLINIC | Age: 36
End: 2021-09-13

## 2021-09-13 ENCOUNTER — PATIENT MESSAGE (OUTPATIENT)
Dept: ENDOCRINOLOGY | Facility: CLINIC | Age: 36
End: 2021-09-13

## 2021-09-13 ENCOUNTER — TELEPHONE (OUTPATIENT)
Dept: SLEEP MEDICINE | Facility: CLINIC | Age: 36
End: 2021-09-13

## 2021-09-14 ENCOUNTER — TELEPHONE (OUTPATIENT)
Dept: PODIATRY | Facility: CLINIC | Age: 36
End: 2021-09-14

## 2021-09-15 ENCOUNTER — PATIENT MESSAGE (OUTPATIENT)
Dept: INTERNAL MEDICINE | Facility: CLINIC | Age: 36
End: 2021-09-15

## 2021-09-15 ENCOUNTER — PATIENT MESSAGE (OUTPATIENT)
Dept: SLEEP MEDICINE | Facility: CLINIC | Age: 36
End: 2021-09-15

## 2021-09-20 ENCOUNTER — PATIENT MESSAGE (OUTPATIENT)
Dept: OBSTETRICS AND GYNECOLOGY | Facility: CLINIC | Age: 36
End: 2021-09-20

## 2021-09-27 ENCOUNTER — PATIENT MESSAGE (OUTPATIENT)
Dept: INTERNAL MEDICINE | Facility: CLINIC | Age: 36
End: 2021-09-27

## 2021-09-30 ENCOUNTER — OFFICE VISIT (OUTPATIENT)
Dept: SLEEP MEDICINE | Facility: CLINIC | Age: 36
End: 2021-09-30
Payer: MEDICAID

## 2021-09-30 ENCOUNTER — PATIENT MESSAGE (OUTPATIENT)
Dept: SLEEP MEDICINE | Facility: CLINIC | Age: 36
End: 2021-09-30

## 2021-09-30 DIAGNOSIS — G47.31 COMPLEX SLEEP APNEA SYNDROME: ICD-10-CM

## 2021-09-30 DIAGNOSIS — G47.33 OSA (OBSTRUCTIVE SLEEP APNEA): Primary | ICD-10-CM

## 2021-09-30 PROCEDURE — 99214 OFFICE O/P EST MOD 30 MIN: CPT | Mod: 95,,, | Performed by: PSYCHIATRY & NEUROLOGY

## 2021-09-30 PROCEDURE — 99214 PR OFFICE/OUTPT VISIT, EST, LEVL IV, 30-39 MIN: ICD-10-PCS | Mod: 95,,, | Performed by: PSYCHIATRY & NEUROLOGY

## 2021-10-01 ENCOUNTER — PATIENT MESSAGE (OUTPATIENT)
Dept: SLEEP MEDICINE | Facility: CLINIC | Age: 36
End: 2021-10-01

## 2021-10-01 ENCOUNTER — TELEPHONE (OUTPATIENT)
Dept: SLEEP MEDICINE | Facility: CLINIC | Age: 36
End: 2021-10-01

## 2021-10-12 ENCOUNTER — LAB VISIT (OUTPATIENT)
Dept: LAB | Facility: OTHER | Age: 36
End: 2021-10-12
Attending: INTERNAL MEDICINE
Payer: MEDICAID

## 2021-10-12 DIAGNOSIS — M85.80 LOW BONE MASS: ICD-10-CM

## 2021-10-12 LAB
ALBUMIN SERPL BCP-MCNC: 4 G/DL (ref 3.5–5.2)
ALP SERPL-CCNC: 48 U/L (ref 55–135)
ALT SERPL W/O P-5'-P-CCNC: 23 U/L (ref 10–44)
ANION GAP SERPL CALC-SCNC: 8 MMOL/L (ref 8–16)
AST SERPL-CCNC: 24 U/L (ref 10–40)
BILIRUB SERPL-MCNC: 0.4 MG/DL (ref 0.1–1)
BUN SERPL-MCNC: 15 MG/DL (ref 6–20)
CALCIUM SERPL-MCNC: 9.2 MG/DL (ref 8.7–10.5)
CHLORIDE SERPL-SCNC: 105 MMOL/L (ref 95–110)
CO2 SERPL-SCNC: 25 MMOL/L (ref 23–29)
CREAT SERPL-MCNC: 0.8 MG/DL (ref 0.5–1.4)
EST. GFR  (AFRICAN AMERICAN): >60 ML/MIN/1.73 M^2
EST. GFR  (NON AFRICAN AMERICAN): >60 ML/MIN/1.73 M^2
ESTRADIOL SERPL-MCNC: 91 PG/ML
FSH SERPL-ACNC: 7.23 MIU/ML
GLUCOSE SERPL-MCNC: 90 MG/DL (ref 70–110)
LH SERPL-ACNC: 7.7 MIU/ML
POTASSIUM SERPL-SCNC: 4.2 MMOL/L (ref 3.5–5.1)
PROT SERPL-MCNC: 6.9 G/DL (ref 6–8.4)
PTH-INTACT SERPL-MCNC: 98 PG/ML (ref 9–77)
SODIUM SERPL-SCNC: 138 MMOL/L (ref 136–145)
T4 FREE SERPL-MCNC: 0.88 NG/DL (ref 0.71–1.51)
TSH SERPL DL<=0.005 MIU/L-ACNC: 2.26 UIU/ML (ref 0.4–4)

## 2021-10-12 PROCEDURE — 82523 COLLAGEN CROSSLINKS: CPT | Mod: 91 | Performed by: INTERNAL MEDICINE

## 2021-10-12 PROCEDURE — 84075 ASSAY ALKALINE PHOSPHATASE: CPT | Mod: 59 | Performed by: INTERNAL MEDICINE

## 2021-10-12 PROCEDURE — 82523 COLLAGEN CROSSLINKS: CPT | Performed by: INTERNAL MEDICINE

## 2021-10-12 PROCEDURE — 84439 ASSAY OF FREE THYROXINE: CPT | Performed by: INTERNAL MEDICINE

## 2021-10-12 PROCEDURE — 80053 COMPREHEN METABOLIC PANEL: CPT | Performed by: INTERNAL MEDICINE

## 2021-10-12 PROCEDURE — 83001 ASSAY OF GONADOTROPIN (FSH): CPT | Performed by: INTERNAL MEDICINE

## 2021-10-12 PROCEDURE — 83970 ASSAY OF PARATHORMONE: CPT | Performed by: INTERNAL MEDICINE

## 2021-10-12 PROCEDURE — 36415 COLL VENOUS BLD VENIPUNCTURE: CPT | Performed by: INTERNAL MEDICINE

## 2021-10-12 PROCEDURE — 84443 ASSAY THYROID STIM HORMONE: CPT | Performed by: INTERNAL MEDICINE

## 2021-10-12 PROCEDURE — 82670 ASSAY OF TOTAL ESTRADIOL: CPT | Performed by: INTERNAL MEDICINE

## 2021-10-12 PROCEDURE — 83002 ASSAY OF GONADOTROPIN (LH): CPT | Performed by: INTERNAL MEDICINE

## 2021-10-12 PROCEDURE — 83516 IMMUNOASSAY NONANTIBODY: CPT | Performed by: INTERNAL MEDICINE

## 2021-10-15 LAB
ALP BONE SERPL-MCNC: 7.7 UG/L (ref 5.3–19.5)
TTG IGA SER-ACNC: 2 UNITS

## 2021-10-18 ENCOUNTER — OFFICE VISIT (OUTPATIENT)
Dept: ENDOCRINOLOGY | Facility: CLINIC | Age: 36
End: 2021-10-18
Payer: MEDICAID

## 2021-10-18 VITALS
BODY MASS INDEX: 21.64 KG/M2 | HEART RATE: 73 BPM | WEIGHT: 114.63 LBS | HEIGHT: 61 IN | SYSTOLIC BLOOD PRESSURE: 115 MMHG | DIASTOLIC BLOOD PRESSURE: 66 MMHG

## 2021-10-18 DIAGNOSIS — M85.80 LOW BONE MASS: ICD-10-CM

## 2021-10-18 DIAGNOSIS — E21.3 HYPERPARATHYROIDISM: Primary | ICD-10-CM

## 2021-10-18 DIAGNOSIS — E55.9 VITAMIN D INSUFFICIENCY: ICD-10-CM

## 2021-10-18 LAB
COLLAGEN CTX SERPL-MCNC: 325 PG/ML
NTX TELOPEPTIDE: 9.6 NM BCE

## 2021-10-18 PROCEDURE — 99215 OFFICE O/P EST HI 40 MIN: CPT | Mod: S$GLB,,, | Performed by: INTERNAL MEDICINE

## 2021-10-18 PROCEDURE — 99215 PR OFFICE/OUTPT VISIT, EST, LEVL V, 40-54 MIN: ICD-10-PCS | Mod: S$GLB,,, | Performed by: INTERNAL MEDICINE

## 2021-10-18 RX ORDER — ERGOCALCIFEROL 1.25 MG/1
50000 CAPSULE ORAL
Qty: 8 CAPSULE | Refills: 0 | Status: SHIPPED | OUTPATIENT
Start: 2021-10-18 | End: 2021-12-29

## 2021-10-19 ENCOUNTER — PATIENT MESSAGE (OUTPATIENT)
Dept: SLEEP MEDICINE | Facility: CLINIC | Age: 36
End: 2021-10-19
Payer: MEDICAID

## 2021-10-20 ENCOUNTER — PATIENT MESSAGE (OUTPATIENT)
Dept: SLEEP MEDICINE | Facility: CLINIC | Age: 36
End: 2021-10-20
Payer: MEDICAID

## 2021-10-25 ENCOUNTER — PATIENT MESSAGE (OUTPATIENT)
Dept: SLEEP MEDICINE | Facility: CLINIC | Age: 36
End: 2021-10-25
Payer: MEDICAID

## 2021-10-29 ENCOUNTER — PATIENT MESSAGE (OUTPATIENT)
Dept: SLEEP MEDICINE | Facility: CLINIC | Age: 36
End: 2021-10-29
Payer: MEDICAID

## 2022-01-19 ENCOUNTER — LAB VISIT (OUTPATIENT)
Dept: LAB | Facility: OTHER | Age: 37
End: 2022-01-19
Attending: INTERNAL MEDICINE
Payer: MEDICAID

## 2022-01-19 DIAGNOSIS — E21.3 HYPERPARATHYROIDISM: ICD-10-CM

## 2022-01-19 LAB
25(OH)D3+25(OH)D2 SERPL-MCNC: 30 NG/ML (ref 30–96)
ALBUMIN SERPL BCP-MCNC: 4 G/DL (ref 3.5–5.2)
ALP SERPL-CCNC: 47 U/L (ref 55–135)
ALT SERPL W/O P-5'-P-CCNC: 25 U/L (ref 10–44)
ANION GAP SERPL CALC-SCNC: 9 MMOL/L (ref 8–16)
AST SERPL-CCNC: 23 U/L (ref 10–40)
BILIRUB SERPL-MCNC: 0.4 MG/DL (ref 0.1–1)
BUN SERPL-MCNC: 13 MG/DL (ref 6–20)
CALCIUM SERPL-MCNC: 9.4 MG/DL (ref 8.7–10.5)
CHLORIDE SERPL-SCNC: 102 MMOL/L (ref 95–110)
CO2 SERPL-SCNC: 25 MMOL/L (ref 23–29)
CREAT SERPL-MCNC: 0.7 MG/DL (ref 0.5–1.4)
EST. GFR  (AFRICAN AMERICAN): >60 ML/MIN/1.73 M^2
EST. GFR  (NON AFRICAN AMERICAN): >60 ML/MIN/1.73 M^2
GLUCOSE SERPL-MCNC: 88 MG/DL (ref 70–110)
PHOSPHATE SERPL-MCNC: 2.9 MG/DL (ref 2.7–4.5)
POTASSIUM SERPL-SCNC: 3.8 MMOL/L (ref 3.5–5.1)
PROT SERPL-MCNC: 6.6 G/DL (ref 6–8.4)
PTH-INTACT SERPL-MCNC: 86 PG/ML (ref 9–77)
SODIUM SERPL-SCNC: 136 MMOL/L (ref 136–145)

## 2022-01-19 PROCEDURE — 82306 VITAMIN D 25 HYDROXY: CPT | Performed by: INTERNAL MEDICINE

## 2022-01-19 PROCEDURE — 84100 ASSAY OF PHOSPHORUS: CPT | Performed by: INTERNAL MEDICINE

## 2022-01-19 PROCEDURE — 80053 COMPREHEN METABOLIC PANEL: CPT | Performed by: INTERNAL MEDICINE

## 2022-01-19 PROCEDURE — 82652 VIT D 1 25-DIHYDROXY: CPT | Performed by: INTERNAL MEDICINE

## 2022-01-19 PROCEDURE — 83970 ASSAY OF PARATHORMONE: CPT | Performed by: INTERNAL MEDICINE

## 2022-01-21 LAB — 1,25(OH)2D3 SERPL-MCNC: 60 PG/ML (ref 20–79)

## 2022-01-24 ENCOUNTER — CLINICAL SUPPORT (OUTPATIENT)
Dept: REHABILITATION | Facility: OTHER | Age: 37
End: 2022-01-24
Payer: MEDICAID

## 2022-01-24 DIAGNOSIS — E21.3 HYPERPARATHYROIDISM: Primary | ICD-10-CM

## 2022-01-24 DIAGNOSIS — M79.10 MYALGIA: ICD-10-CM

## 2022-01-24 DIAGNOSIS — R27.9 LACK OF COORDINATION: ICD-10-CM

## 2022-01-24 PROCEDURE — 97161 PT EVAL LOW COMPLEX 20 MIN: CPT

## 2022-01-24 PROCEDURE — 97112 NEUROMUSCULAR REEDUCATION: CPT

## 2022-01-25 ENCOUNTER — PATIENT MESSAGE (OUTPATIENT)
Dept: ENDOCRINOLOGY | Facility: CLINIC | Age: 37
End: 2022-01-25
Payer: MEDICAID

## 2022-01-25 ENCOUNTER — PATIENT MESSAGE (OUTPATIENT)
Dept: REHABILITATION | Facility: OTHER | Age: 37
End: 2022-01-25
Payer: MEDICAID

## 2022-01-26 ENCOUNTER — PATIENT MESSAGE (OUTPATIENT)
Dept: REHABILITATION | Facility: OTHER | Age: 37
End: 2022-01-26
Payer: MEDICAID

## 2022-01-26 NOTE — PATIENT INSTRUCTIONS
Home Program 1/24/22:    1) Breathing      360 Breath - Inhale long, slow and deep. You should feel as if your lower ribs are expanding in all directions like the way an umbrella opens. You should feel the belly, back and sides gently expand and you may notice a relaxation in the pelvic floor.     Continue to breath like this for 3 minutes. Repeat 1 times/day.     2) Breathing and pelvic floor contractions:  Do 10 breaths as described above. Then follow with 5 pelvic floor muscle contractions, focusing on relaxing completely each time. Then do 5 more breaths. Do this sequence 3x/day.

## 2022-01-26 NOTE — PLAN OF CARE
[facility]  Pelvic Health Physical Therapy Initial Evaluation    Date: 2022   Name: Syl Elise  Clinic Number: [id number]  Therapy Diagnosis: No diagnosis found.  Physician: Olivia Schofield MD    Physician Orders: PT Eval and Treat  Medical Diagnosis from Referral: myalgia  Evaluation Date: 2022  Authorization Period Expiration: 1 visit  Plan of Care Expiration: 22  Visit # / Visits authorized:     Time In: 1007  Time Out: 1100  Total Appointment Time (timed & untimed codes): 53 minutes    Precautions: universal    Subjective     Pronouns: she/her    Date of onset:  - got UTI, cycle of UTIs/symptoms/antibx treatments    History of current condition - Eboni reports: Was diagnosed with IC. Had pelvic PT in , MD thought good to go back. Things are much better than it used to be but still has urinary urgency. More aware of tension now and sometimes feels hard to let go. History of hip issues, decreased stability on L side. Can feel uneven. This was better when she did PT but its gotten worse again. hx of stomaches and diarrhea    OB/GYN History:   Surgical History: egg retrieval   Birth Control: none  History of chronic yeast, BV or UTIs? Yes - UTIs  Sexually active? Yes  Pain with vaginal exams, intercourse or tampon use? Yes, penetrative  Pleasure with sex? yes  Ability to achieve orgasm? Not usually - on SSRIs  Are you comfortable with the appearance of your genitals? Yes    Bladder/Bowel History:    Frequency of urination:   Daytime: variable, trying to find normalized pattern/habits           Nighttime: 0-1   Difficulty initiating urine stream: Yes, but improving - puts feet on foot stool , hesitancy   Urine stream: strong   Complete emptying: Yes   Bladder leakage: No - but has fear of leakage   Frequency of incidents/Type of incontinence: none   Amount leaked (urine): na   Urinary Urgency: Yes   Frequency of bowel movements: 2 times a  day   Difficulty initiating BM: No- hx of stomaches and diarrhea   Quality/Shape of BM: Barrett Stool Chart 3-4   Does Patient Feel Empty after BM? Yes   Fiber Supplements or Laxative Use? No   Colon leakage: No   Frequency of incidents: na    Fecal Urgency: No   Form of protection: none   Number of pads required in 24 hours: none   History of coccyx injury: No    Prolapse Screening:  Feeling of vaginal bulge: No    Pain:  Location: left hip(s), sometimes both  Aggravating Factors/Activities that cause symptoms: Prolonged sitting   Easing Factors: strengthening exercises     Medical History: Eboni  has a past medical history of Abnormal Pap smear of cervix (2004), ADHD (attention deficit hyperactivity disorder), Hip pain, History of amenorrhea bt [age] yoa, Strabismus, and Vitamin D deficiency.     Surgical History: Syl Elise  has a past surgical history that includes Cervical biopsy w/ loop electrode excision (2005); Eye surgery (1986, 1990); Esophagogastroduodenoscopy (N/A, 2/7/2020); Colonoscopy (N/A, 2/7/2020); and Colon surgery (2020).    Medications: Syl has a current medication list which includes the following prescription(s): calcium-vitamin d3, cholecalciferol (vitamin d3), dextroamphetamine-amphetamine, ergocalciferol, escitalopram oxalate, ipratropium, prenate mini (ferr asp glycin), and amy.    Allergies:   Review of patient's allergies indicates:   Allergen Reactions    Bactrim [sulfamethoxazole-trimethoprim] Other (See Comments)     Skin tingling, muscle aches/weakness    Sulfa (sulfonamide antibiotics)      Other reaction(s): Other (See Comments)  Skin numbness      Prior Therapy/Previous treatment included: pelvic PT early 2021, ortho PT for hips 2017  Social History: lives with 2 friends  Current exercise: pilates class once/week, cardio every other week, walking  Occupation: looking for work    Types of fluid intake: coffee 1 cup, water 8-32 ounces  Diet: TBA   Habitus:  thin  Abuse/Neglect: No   History of disordered eating: Yes anorexic in high school   History of anxiety/depression: Yes on SSRIs     Pts goals: to develop a sustainable management and preventative program, to create a good routine for management    OBJECTIVE     See EMR under MEDIA for written consent provided 1/24/2022  Chaperone: Declined    ORTHO SCREEN  Posture in sitting: slouched   Posture in standing: posterior pelvic tilt   Pelvic alignment: no sign of deviations noted in supine   SLS: NT  Lumbar ROM: NT  Pubic symphysis: NT    ABDOMINAL WALL ASSESSMENT  Palpation: WNL  Abdominal strength: fair   Scarring:none  Pelvic Floor Muscle and Transverse Abdominus Synergy :no   Diastasis: no    BREATHING MECHANICS ASSESSMENT   Thorax Assessment During Quiet Respiration: WNL excursion of abdominal wall  Thorax Assessment During Deep Respiration: WNL excursion of ribcage    VAGINAL PELVIC FLOOR EXAM    EXTERNAL ASSESSMENT  Introitus: WNL  Skin condition: WNL  Scarring: none   Sensation: WNL   Pain: none  Voluntary contraction: visible lift  Voluntary relaxation: visible drop  Involuntary contraction: bulge  Bearing down: visible drop  Perineal descent: absent  Comments: na      INTERNAL ASSESSMENT  Pain: tenderness throughout with muscle tension  Sensation: able to localized pressure appropriately   Vaginal vault: WNL   Muscle Bulk: hypertonus   Muscle Power: 2/5, active insufficiency  Muscle Endurance: 10 sec  # Reps To Fatigue: 2    Fast Contractions in 10 seconds: 3     Quality of contraction: slow relaxation and incomplete relaxation   Specificity: WNL   Coordination: WNL   Prolapse check: none  Comments: na    Limitation/Restriction for FOTO Urinary Survey    Therapist reviewed FOTO scores for Syl Elise on 1/24/2022.   FOTO documents entered into [facility] - see Media section.    Limitation Score: 28%       TREATMENT     Treatment Time In: 1040  Treatment Time Out: 1100  Total Treatment time (time-based codes)  separate from Evaluation: 20 minutes    Neuromuscular Re-education to develop Coordination, Control and Proprioception for 15 minutes including:   diaphragmatic breathing, diaphragmatic breathing with Kegel and pelvic floor mm contractions focus on activation at 3 layers sequentially in isolation and then sequentially    Therapeutic Activity Patient participated in dynamic functional therapeutic activities to improve functional performance for 5 minutes. Including: Education as described below.   Explanation of POC, determining appropriate exercise/program    Patient Education provided:   general anatomy/physiology of urinary/ bowel  system and benefits of treatment were discussed with the pt. Additionally, anatomy/physiology of pelvic floor, diaphragmatic breathing and kegels were reviewed.     Home Exercises provided:  Written Home Exercises provided: Yes  Exercises were reviewed and Eboni was able to demonstrate them prior to the end of the session.    Eboni demonstrated good  understanding of the education provided.     See EMR under Patient Instructions for exercises provided 1/24/2022.    Assessment     Syl is a 36 y.o. female referred to outpatient Physical Therapy with a medical diagnosis of myalgia. Pt presents with altered posture, poor knowledge of body mechanics and posture, poor trunk stability, pelvic floor tenderness, increased tension of the pelvic muscles, poor fluid intake, dysfunctional voiding and unable to co-contract or co-relax abdominal wall and pelvic floor muscles. Mild overactivity and TTP. Active insufficiency with incomplete relaxation pelvic diaphragm. Initiated downtraining exercises. She will also benefit from core and hip mm strength evaluation as reports hx of hip dysfunction which may be contributing. She has a pelvic wand, which she has not used. Will bring to next session for training.    Pt prognosis is Excellent  Pt will benefit from skilled outpatient Physical Therapy to  address the deficits stated above and in the chart below, provide pt/family education, and to maximize pt's level of independence.     Plan of care discussed with patient: Yes  Pt's spiritual, cultural and educational needs considered and patient is agreeable to the plan of care and goals as stated below:     Anticipated Barriers for therapy: None    Medical Necessity is demonstrated by the following:    History  Co-morbidities and personal factors that may impact the plan of care Co-morbidities   hx of UTIs, hip pain    Personal Factors  no deficits     moderate   Examination  Body structures and functions, activity limitations and participation restrictions that may impact the plan of care Body Regions/Systems/Functions:  altered posture, poor knowledge of body mechanics and posture, poor trunk stability, pelvic floor tenderness, increased tension of the pelvic muscles, poor fluid intake, dysfunctional voiding and unable to co-contract or co-relax abdominal wall and pelvic floor muscles    Activity Limitations:  urgency , delaying urge to urinate and intercourse/vaginal exam/tampon use without pain    Participation Restrictions:  all ADLs/iADLs uninterrupted by urinary incontinence/urgency/frequency and relationship with spouse/partner    Activity limitations:   Learning and applying knowledge  No deficits    General Tasks and Commands  No deficits    Communication  No deficits    Mobility  No deficits    Self care  No deficits    Domestic Life  No deficits    Interactions/Relationships  No deficits    Life Areas  No deficits    Community and Social Life  No deficits       moderate   Clinical Presentation stable and uncomplicated low   Decision Making/ Complexity Score: low       Goals:  Short Term Goals: 4 weeks   Pt indep in HEP  Pt indep in functional brace technique for decreased strain of pelvic structures with activities which increase IAP  Pt able to wait at least 2 hours between trips to the bathroom for  improved participation in ADLs  Pt indep with urge suppression techniques to reduce urinary urgency and increase time between voids for improved participation in ADLs  PT demo pelvic floor mm strength 3/5 for improved urinary function  Pt demo complete contraction and deactivation of pelvic floor muscles x 10 reps      Long Term Goals: 8 weeks   Pt indep in progressive HEP  Pt able to wait at least 2.5-4 hours between trips to the bathroom   Nocturia no more than 1x/night for improved quality of sleep  Pt reports able to have painfree intercourse for improved participation in ADLs and improved intimacy with partner  PT demo pelvic floor mm strength 3+/5 for improved urinary function    Plan     Plan of care Certification: 1/24/2022 to 4/22/22.    Outpatient Physical Therapy 1 times weekly for 12 weeks to include the following interventions: therapeutic exercises, therapeutic activity, neuromuscular re-education, manual therapy, patient/family education and self care/home management    Next visit: wanaguilar training, hip screen    Carisa Koch, PT

## 2022-02-02 ENCOUNTER — CLINICAL SUPPORT (OUTPATIENT)
Dept: REHABILITATION | Facility: OTHER | Age: 37
End: 2022-02-02
Attending: INTERNAL MEDICINE
Payer: MEDICAID

## 2022-02-02 DIAGNOSIS — R27.9 LACK OF COORDINATION: ICD-10-CM

## 2022-02-02 DIAGNOSIS — M79.10 MYALGIA: Primary | ICD-10-CM

## 2022-02-02 PROCEDURE — 97110 THERAPEUTIC EXERCISES: CPT

## 2022-02-02 NOTE — PATIENT INSTRUCTIONS
Home Program 2/2/22    Clamshells - lay on side with knees bent and top of pelvic rolled slightly forward. Keeping ankles together lift top knee without pelvis rolling backwards. Do 3 sets of 10.       Sidelying leg lifts - lay on side, bottom leg bent, top leg straight. Top of pelvis rolled forward and leg back in line with trunk. Lift and lower top leg. 3 sets to fatigue.

## 2022-02-02 NOTE — PROGRESS NOTES
Pelvic Health Physical Therapy   Treatment Note     Name: Syl Elise  Clinic Number: [id number]    Therapy Diagnosis: No diagnosis found.  Physician: Olivia Schofield MD    Visit Date: 2/2/2022    Physician Orders: PT Eval and Treat  Medical Diagnosis from Referral: myalgia  Evaluation Date: 1/24/2022  Authorization Period Expiration: 20 visits 2/2/22 to 12/31/22  Plan of Care Expiration: 4/22/22  Visit # / Visits authorized: 1/ 20    Cancelled Visits: 0  No Show Visits: 0  FOTO: 1    Time In: 112 (pt arrived late)  Time Out: 200  Total Billable Time: 48 minutes    Precautions: Standard    Subjective     Pronouns: she/her    Pt reports: Felt fine after evaluation but has noticed more urgency this week. Also difficult to initiate urination and then small volumes. Deep breathing does help.   She was compliant with home exercise program.  Response to previous treatment: felt fine  Functional change: no change - aware of more urgency    Pain: 0/10  Location:     Objective     Eboni received therapeutic exercises to develop strength and endurance for 48 minutes including: SL hip abd  Clamshells  PFME - downtraining  Passive stretching pelvic floor mm with verbal instructions how to use wand    Home Exercises Provided and Patient Education Provided     Education provided:   - anatomy/physiology of pelvic floor, pelvic wand use and diaphragmatic breathing  Discussed progression of plan of care with patient; educated pt in activity modification; reviewed HEP with pt. Pt demonstrated and verbalized understanding of all instruction and was provided with a handout of HEP (see Patient Instructions).    Written Home Exercises Provided: yes.  Exercises were reviewed and Eboni was able to demonstrate them prior to the end of the session.  Eboni demonstrated good  understanding of the education provided.     See EMR under Patient Instructions for exercises provided 2/2/2022.    Assessment     Continues increased tension pelvic  floor mm. Initiated wand training with good return demo. Added hip strengthening as hips with excessive ROM and poor stability.  Eboni Is progressing well towards her goals.   Pt prognosis is Excellent.     Pt will continue to benefit from skilled outpatient physical therapy to address the deficits listed in the problem list box on initial evaluation, provide pt/family education and to maximize pt's level of independence in the home and community environment.     Pt's spiritual, cultural and educational needs considered and pt agreeable to plan of care and goals.     Anticipated barriers to physical therapy: none    Goals: Short Term Goals: 4 weeks   Pt indep in HEP MET  Pt indep in functional brace technique for decreased strain of pelvic structures with activities which increase IAP PROGRESSING  Pt able to wait at least 2 hours between trips to the bathroom for improved participation in ADLs PROGRESSING  Pt indep with urge suppression techniques to reduce urinary urgency and increase time between voids for improved participation in ADLs PROGRESSING  PT demo pelvic floor mm strength 3/5 for improved urinary function PROGRESSING  Pt demo complete contraction and deactivation of pelvic floor muscles x 10 reps PROGRESSING        Long Term Goals: 8 weeks   Pt indep in progressive HEP PROGRESSING  Pt able to wait at least 2.5-4 hours between trips to the bathroom PROGRESSING  Nocturia no more than 1x/night for improved quality of sleep PROGRESSING  Pt reports able to have painfree intercourse for improved participation in ADLs and improved intimacy with partner PROGRESSING  PT demo pelvic floor mm strength 3+/5 for improved urinary function PROGRESSING    Plan      Plan of care Certification: 1/24/2022 to 4/22/22.     Outpatient Physical Therapy 1 times weekly for 12 weeks to include the following interventions: therapeutic exercises, therapeutic activity, neuromuscular re-education, manual therapy, patient/family  education and self care/home management    Next visit: manual, review samuel ashley, TrA    Carisa Koch, PT

## 2022-02-09 ENCOUNTER — PATIENT MESSAGE (OUTPATIENT)
Dept: ENDOCRINOLOGY | Facility: CLINIC | Age: 37
End: 2022-02-09
Payer: MEDICAID

## 2022-02-09 ENCOUNTER — PATIENT MESSAGE (OUTPATIENT)
Dept: REHABILITATION | Facility: OTHER | Age: 37
End: 2022-02-09
Payer: MEDICAID

## 2022-02-17 ENCOUNTER — CLINICAL SUPPORT (OUTPATIENT)
Dept: REHABILITATION | Facility: OTHER | Age: 37
End: 2022-02-17
Attending: OBSTETRICS & GYNECOLOGY
Payer: MEDICAID

## 2022-02-17 DIAGNOSIS — M79.10 MYALGIA: Primary | ICD-10-CM

## 2022-02-17 DIAGNOSIS — R27.9 LACK OF COORDINATION: ICD-10-CM

## 2022-02-17 PROCEDURE — 97110 THERAPEUTIC EXERCISES: CPT

## 2022-02-17 NOTE — PROGRESS NOTES
Pelvic Health Physical Therapy   Treatment Note     Name: Syl Elise  Clinic Number: [id number]    Therapy Diagnosis:   No diagnosis found.  Physician: Olivia Schofield MD    Visit Date: 2/17/2022    Physician Orders: PT Eval and Treat  Medical Diagnosis from Referral: myalgia  Evaluation Date: 1/24/2022  Authorization Period Expiration: 20 visits 2/2/22 to 12/31/22  Plan of Care Expiration: 4/22/22  Visit # / Visits authorized: 2/ 20    Cancelled Visits: 0  No Show Visits: 0  FOTO: 1    Time In: 907  Time Out: 1000  Total Billable Time: 53 minutes    Precautions: Standard    Subjective     Pronouns: she/her    Pt reports: Had COVID. Feeling ok now, just some fatigue. Wasn't able to do exercises until recently. Working on relaxing the pelvic floor mm. Most bothersome feeling is more constant, low level urge. Urge suppression techniques work for acute urges. The more constant feeling does go away if bladder is empty and if pelvic floor more relaxed.   She was compliant with home exercise program.  Response to previous treatment: felt fine  Functional change: no change - aware of more urgency    Pain: 0/10  Location:     Objective     Eboni received therapeutic exercises to develop strength and endurance for 53 minutes including:  SL hip abd and Clamshells review  TrA isolation > TrA with marching  Passive stretching pelvic floor mm   Discussion of causes of pelvic floor tension and how to treat  Physiological relaxation - 1 minute mindfulness breathing exercise, 3 minute guided body scan     Home Exercises Provided and Patient Education Provided     Education provided:   - anatomy/physiology of pelvic floor, pelvic wand use and diaphragmatic breathing  Discussed progression of plan of care with patient; educated pt in activity modification; reviewed HEP with pt. Pt demonstrated and verbalized understanding of all instruction and was provided with a handout of HEP (see Patient Instructions).    Written Home  Exercises Provided: yes.  Exercises were reviewed and Eboni was able to demonstrate them prior to the end of the session.  Eboni demonstrated good  understanding of the education provided.     See EMR under Patient Instructions for exercises provided 2/2/2022.    Assessment     Discussed strategies for anxiety and stress as likely contributing to mm tension and how brain perceives and responds to urgency. She was not able to be compliant with HEP so having her continue same exercises to be able to get back in regular routine of performance and progressed only for core strengthening. Recommending daily performance of mindfulness or guided meditation for physiological calming.  Eboni Is progressing well towards her goals.   Pt prognosis is Excellent.     Pt will continue to benefit from skilled outpatient physical therapy to address the deficits listed in the problem list box on initial evaluation, provide pt/family education and to maximize pt's level of independence in the home and community environment.     Pt's spiritual, cultural and educational needs considered and pt agreeable to plan of care and goals.     Anticipated barriers to physical therapy: none    Goals: Short Term Goals: 4 weeks   Pt indep in HEP MET  Pt indep in functional brace technique for decreased strain of pelvic structures with activities which increase IAP PROGRESSING  Pt able to wait at least 2 hours between trips to the bathroom for improved participation in ADLs PROGRESSING  Pt indep with urge suppression techniques to reduce urinary urgency and increase time between voids for improved participation in ADLs PROGRESSING  PT demo pelvic floor mm strength 3/5 for improved urinary function PROGRESSING  Pt demo complete contraction and deactivation of pelvic floor muscles x 10 reps PROGRESSING        Long Term Goals: 8 weeks   Pt indep in progressive HEP PROGRESSING  Pt able to wait at least 2.5-4 hours between trips to the bathroom  PROGRESSING  Nocturia no more than 1x/night for improved quality of sleep PROGRESSING  Pt reports able to have painfree intercourse for improved participation in ADLs and improved intimacy with partner PROGRESSING  PT demo pelvic floor mm strength 3+/5 for improved urinary function PROGRESSING    Plan      Plan of care Certification: 1/24/2022 to 4/22/22.     Outpatient Physical Therapy 1 times weekly for 12 weeks to include the following interventions: therapeutic exercises, therapeutic activity, neuromuscular re-education, manual therapy, patient/family education and self care/home management    Next visit: progress core and hips, review meditation, manual as needed    Carisa Koch, PT

## 2022-02-17 NOTE — PATIENT INSTRUCTIONS
"Home Program 2/17/22:    1) Deep core/lower abdominals - tighten lower abdominals like firming a rubber band across front of hips. Can feel just inside hip bones for correct contraction. Can make "shhh" sound on exhale to feel for right muscles.    Keep this muscle tight as you lift and lower legs, alternating in to march     Perform 3 sets of 10 reps (5 per leg).    2) Clamshells - lay on side with knees bent and top of pelvic rolled slightly forward. Keeping ankles together lift top knee without pelvis rolling backwards. Do 3 sets of 10.       3) Sidelying leg lifts - lay on side, bottom leg bent, top leg straight. Top of pelvis rolled forward and leg back in line with trunk. Lift and lower top leg. 3 sets to fatigue.       4) Daily Meditation - Incorporate a 3-5 minute meditation each day.   - [employer] wild for timed breathing or body scan   - Calm subscription      "

## 2022-02-22 NOTE — PROGRESS NOTES
Pelvic Health Physical Therapy   Treatment Note     Name: Syl Elise  Clinic Number: [id number]    Therapy Diagnosis:   No diagnosis found.  Physician: Olivia Schofield MD    Visit Date: 2/23/2022    Physician Orders: PT Eval and Treat  Medical Diagnosis from Referral: myalgia  Evaluation Date: 1/24/2022  Authorization Period Expiration: 20 visits 2/2/22 to 12/31/22  Plan of Care Expiration: 4/22/22  Visit # / Visits authorized: 3/20    Cancelled Visits: 0  No Show Visits: 0  FOTO: 1    Time In: 115 (pt arrived late)  Time Out: 155  Total Billable Time: 40 minutes    Precautions: Standard    Subjective     Pronouns: she/her    Pt reports: Still feeling fatigue from COVID and having family over this past week limited her ability to do exercises. Wasn't able to do exercises until yesterday. Feels she is able to relax PF more and intiate urine stream better.   She was compliant with home exercise program.  Response to previous treatment: felt fine  Functional change: no change - aware of more urgency    Pain: 0/10  Location:     Objective     Eboni received therapeutic exercises to develop strength and endurance for 40 minutes including:  SL hip abd and Clamshells review  SL Glut bridges   TrA with marching > TrA SL flexion  Passive stretching pelvic floor mm   Diaphragmatic breathing with pelvic floor contractions and cues for relaxations.    Home Exercises Provided and Patient Education Provided     Education provided:   - anatomy/physiology of pelvic floor, pelvic wand use and diaphragmatic breathing  Discussed progression of plan of care with patient; educated pt in activity modification; reviewed HEP with pt. Pt demonstrated and verbalized understanding of all instruction and was provided with a handout of HEP (see Patient Instructions).    Written Home Exercises Provided: yes.  Exercises were reviewed and Eboni was able to demonstrate them prior to the end of the session.  Eboni demonstrated good  understanding  of the education provided.     See EMR under Patient Instructions for exercises provided 2/2/2022.    Assessment     Received manual therapy of ischemic compression on pelvic floor mm for decrease in overactivity and tension. Noted decrease in muscle tension throughout pelvic floor as compared to previous visits. Also noted stool in rectum. Reviewed HEP. Progressed to SL glut bridges for glut med strength and TrA progression for increased pelvic stability.    Eboni Is progressing well towards her goals.   Pt prognosis is Excellent.     Pt will continue to benefit from skilled outpatient physical therapy to address the deficits listed in the problem list box on initial evaluation, provide pt/family education and to maximize pt's level of independence in the home and community environment.     Pt's spiritual, cultural and educational needs considered and pt agreeable to plan of care and goals.     Anticipated barriers to physical therapy: none    Goals: Short Term Goals: 4 weeks   Pt indep in HEP MET  Pt indep in functional brace technique for decreased strain of pelvic structures with activities which increase IAP PROGRESSING  Pt able to wait at least 2 hours between trips to the bathroom for improved participation in ADLs PROGRESSING  Pt indep with urge suppression techniques to reduce urinary urgency and increase time between voids for improved participation in ADLs PROGRESSING  PT demo pelvic floor mm strength 3/5 for improved urinary function PROGRESSING  Pt demo complete contraction and deactivation of pelvic floor muscles x 10 reps PROGRESSING        Long Term Goals: 8 weeks   Pt indep in progressive HEP PROGRESSING  Pt able to wait at least 2.5-4 hours between trips to the bathroom PROGRESSING  Nocturia no more than 1x/night for improved quality of sleep PROGRESSING  Pt reports able to have painfree intercourse for improved participation in ADLs and improved intimacy with partner PROGRESSING  PT demo pelvic  floor mm strength 3+/5 for improved urinary function PROGRESSING    Plan      Plan of care Certification: 1/24/2022 to 4/22/22.     Outpatient Physical Therapy 1 times weekly for 12 weeks to include the following interventions: therapeutic exercises, therapeutic activity, neuromuscular re-education, manual therapy, patient/family education and self care/home management    Next visit: discuss bowel habits and ask about constipation, progress core and hips, manual as needed    Carisa Koch, PT

## 2022-02-23 ENCOUNTER — CLINICAL SUPPORT (OUTPATIENT)
Dept: REHABILITATION | Facility: OTHER | Age: 37
End: 2022-02-23
Attending: INTERNAL MEDICINE
Payer: MEDICAID

## 2022-02-23 DIAGNOSIS — R27.9 LACK OF COORDINATION: ICD-10-CM

## 2022-02-23 DIAGNOSIS — M79.10 MYALGIA: Primary | ICD-10-CM

## 2022-02-23 PROCEDURE — 97110 THERAPEUTIC EXERCISES: CPT

## 2022-02-23 NOTE — PATIENT INSTRUCTIONS
"1) Deep core/lower abdominals - tighten lower abdominals like firming a rubber band across front of hips. Can feel just inside hip bones for correct contraction. Can make "shhh" sound on exhale to feel for right muscles.    Keep this muscle tight as you lift and lower legs, alternating in to march     Perform 2 sets of 10 reps (10 per leg).     2) Clamshells - lay on side with knees bent and top of pelvic rolled slightly forward. Keeping ankles together lift top knee without pelvis rolling backwards. Do 3 sets of 10.       3) Sidelying leg lifts - lay on side, bottom leg bent, top leg straight. Top of pelvis rolled forward and leg back in line with trunk. Lift and lower top leg. 3 sets to fatigue.       4) Single Leg Glut Bridge - Lie on back with knees bent. Lift one leg off the ground. Activate deep core and then lift hips off the ground into the bridging position. Hold for 1 second and then lower back down. You should feel this movement in back and sides of glutes. Adjust feet as needed. Perform 2 sets of 10 per leg.            5) Breathing and pelvic floor contractions:  Do 10 breaths as described above. Then follow with 5 pelvic floor muscle contractions, focusing on relaxing completely each time. Then do 5 more breaths. Do this sequence 3x/day.     6) Daily Meditation - Incorporate a 3-5 minute meditation each day.              - [employer] wild for timed breathing or body scan              - [employer] subscription  "

## 2022-03-03 ENCOUNTER — CLINICAL SUPPORT (OUTPATIENT)
Dept: REHABILITATION | Facility: OTHER | Age: 37
End: 2022-03-03
Attending: INTERNAL MEDICINE
Payer: MEDICAID

## 2022-03-03 DIAGNOSIS — M79.10 MYALGIA: Primary | ICD-10-CM

## 2022-03-03 DIAGNOSIS — R27.9 LACK OF COORDINATION: ICD-10-CM

## 2022-03-03 PROCEDURE — 97110 THERAPEUTIC EXERCISES: CPT

## 2022-03-03 NOTE — PROGRESS NOTES
Pelvic Health Physical Therapy   Treatment Note     Name: Syl Elise  Clinic Number: [id number]    Therapy Diagnosis:   Encounter Diagnoses   Name Primary?    Myalgia Yes    Lack of coordination      Physician: Olivia Schofield MD    Visit Date: 3/3/2022    Physician Orders: PT Eval and Treat  Medical Diagnosis from Referral: myalgia  Evaluation Date: 1/24/2022  Authorization Period Expiration: 20 visits 2/2/22 to 12/31/22  Plan of Care Expiration: 4/22/22  Visit # / Visits authorized: 4/20    Cancelled Visits: 0  No Show Visits: 0  FOTO: 2 on 3/3/22 - 8% (13% at eval)    Time In: 909 (pt arrived late)  Time Out: 959  Total Billable Time: 50 minutes    Precautions: Standard    Subjective     Pronouns: she/her    Pt reports: Did meditation almost every day. Hasnt had any low level urgency. Hip has been bothering her less lately. Has noticed that if she starts the day by holding bladder too long then she has more issues the rest of that day.   She was compliant with home exercise program.  Response to previous treatment: felt fine  Functional change: no change - aware of more urgency    Pain: 0/10  Location:     Objective     Eboni received therapeutic exercises to develop strength and endurance for 50 minutes PFME: breathing, QFs x 10, LHs 10 sec on/5 sec rest x 3  SL Glut bridges   SL hip abd  TrA with marching > TrA SL flexion  Passive stretching pelvic floor mm   Diaphragmatic breathing with pelvic floor contractions and cues for relaxations.  Review of bladder norms - do not overhold    Home Exercises Provided and Patient Education Provided     Education provided:   - anatomy/physiology of pelvic floor, pelvic wand use and diaphragmatic breathing  Discussed progression of plan of care with patient; educated pt in activity modification; reviewed HEP with pt. Pt demonstrated and verbalized understanding of all instruction and was provided with a handout of HEP (see Patient Instructions).    Written Home  Exercises Provided: yes.  Exercises were reviewed and Eboni was able to demonstrate them prior to the end of the session.  Eboni demonstrated good  understanding of the education provided.     See EMR under Patient Instructions for exercises provided 2/2/2022.    Assessment     Pt reports sometimes holding urination and ignoring urges and then having increased symptoms rest of day. Reviewed bladder norms and gave guidelines for frequency and she will pay attention to timing and urge signals. She does report improvement with decreased sensation of baseline constant urge. Pelvic floor continues with greater relaxation and able to add PFME today for contractions.    Eboni Is progressing well towards her goals.   Pt prognosis is Excellent.     Pt will continue to benefit from skilled outpatient physical therapy to address the deficits listed in the problem list box on initial evaluation, provide pt/family education and to maximize pt's level of independence in the home and community environment.     Pt's spiritual, cultural and educational needs considered and pt agreeable to plan of care and goals.     Anticipated barriers to physical therapy: none    Goals: Short Term Goals: 4 weeks   Pt indep in HEP MET  Pt indep in functional brace technique for decreased strain of pelvic structures with activities which increase IAP PROGRESSING  Pt able to wait at least 2 hours between trips to the bathroom for improved participation in ADLs PROGRESSING  Pt indep with urge suppression techniques to reduce urinary urgency and increase time between voids for improved participation in ADLs PROGRESSING  PT demo pelvic floor mm strength 3/5 for improved urinary function PROGRESSING  Pt demo complete contraction and deactivation of pelvic floor muscles x 10 reps PROGRESSING        Long Term Goals: 8 weeks   Pt indep in progressive HEP PROGRESSING  Pt able to wait at least 2.5-4 hours between trips to the bathroom PROGRESSING  Nocturia no  more than 1x/night for improved quality of sleep PROGRESSING  Pt reports able to have painfree intercourse for improved participation in ADLs and improved intimacy with partner PROGRESSING  PT demo pelvic floor mm strength 3+/5 for improved urinary function PROGRESSING    Plan      Plan of care Certification: 1/24/2022 to 4/22/22.     Outpatient Physical Therapy 1 times weekly for 12 weeks to include the following interventions: therapeutic exercises, therapeutic activity, neuromuscular re-education, manual therapy, patient/family education and self care/home management    Next visit: review PFME, bladder norms, manual hip and core    Carisa Koch, PT

## 2022-03-08 NOTE — PROGRESS NOTES
Pelvic Health Physical Therapy   Treatment Note     Name: Syl Elise  Clinic Number: [id number]    Therapy Diagnosis:   Encounter Diagnoses   Name Primary?    Myalgia Yes    Lack of coordination      Physician: Olivia Schofield MD    Visit Date: 3/10/2022    Physician Orders: PT Eval and Treat  Medical Diagnosis from Referral: myalgia  Evaluation Date: 1/24/2022  Authorization Period Expiration: 20 visits 2/2/22 to 12/31/22  Plan of Care Expiration: 4/22/22  Visit # / Visits authorized: 5/20    Cancelled Visits: 0  No Show Visits: 0  FOTO: 2 on 3/3/22 - 8% (13% at eval)    Time In: 904  Time Out: 955  Total Billable Time: 56 minutes    Precautions: Standard    Subjective     Pronouns: she/her    Pt reports: Past few days she has been experiencing low level urgency. She has been very stressed due to job searching which has made it worse. No leakage. Having difficulty remembering to do suppression techniques when she is busy to decrease her frequency.    She was compliant with home exercise program.  Response to previous treatment: felt fine  Functional change: no change - aware of more urgency    Pain: 0/10  Location:     Objective     Eboni received therapeutic exercises to develop strength and endurance for 56 minutes PFME: breathing, QFs x 10, LHs 10 sec on/5 sec rest x 3  SL Glut bridges   SL hip abd > side plank hip abduction  TrA SL flexion  SL squats  Passive stretching pelvic floor mm   Quick flicks 3x10  10 sec PF endurance holds at 50% contraction 3x5      Home Exercises Provided and Patient Education Provided     Education provided:   - anatomy/physiology of pelvic floor, pelvic wand use and diaphragmatic breathing  Discussed progression of plan of care with patient; educated pt in activity modification; reviewed HEP with pt. Pt demonstrated and verbalized understanding of all instruction and was provided with a handout of HEP (see Patient Instructions).    Written Home Exercises Provided:  yes.  Exercises were reviewed and Eboni was able to demonstrate them prior to the end of the session.  Eboni demonstrated good  understanding of the education provided.     See EMR under Patient Instructions for exercises provided 2/2/2022.    Assessment     Eboni displayed significantly improved ability to relax of pelvic floor mm after contraction during intravaginal examination. Pt has tension within the pelvic floor primarily around OI with tenderness to palpation noted on left side, although improved from initial visit. Progressed patient with endurance holds and quick flicks for improved strength and cues for complete relaxation after contraction given to decrease tension. Progressed patient ther ex with side planks and hip abduction as well as SL squats. Pt had difficulty with SL squat on the left leg with dynamic knee valgus, but was able to correct appropriately with min cueing. Overall, pt's symptoms have decreased since the beginning of therapy with no episodes of UI and decreasing urgency. Believe that continuing to increase strength of hip musculature to decrease reliance on pelvic floor mm for stability while continuing to focus on relaxation of pelvic floor and retraining bladder habits will continue to decrease symptoms of urgency.    Eboni Is progressing well towards her goals.   Pt prognosis is Excellent.     Pt will continue to benefit from skilled outpatient physical therapy to address the deficits listed in the problem list box on initial evaluation, provide pt/family education and to maximize pt's level of independence in the home and community environment.     Pt's spiritual, cultural and educational needs considered and pt agreeable to plan of care and goals.     Anticipated barriers to physical therapy: none    Goals: Short Term Goals: 4 weeks   Pt indep in HEP MET  Pt indep in functional brace technique for decreased strain of pelvic structures with activities which increase IAP PROGRESSING  Pt  able to wait at least 2 hours between trips to the bathroom for improved participation in ADLs PROGRESSING  Pt indep with urge suppression techniques to reduce urinary urgency and increase time between voids for improved participation in ADLs MET  PT demo pelvic floor mm strength 3/5 for improved urinary function MET  Pt demo complete contraction and deactivation of pelvic floor muscles x 10 reps MET        Long Term Goals: 8 weeks   Pt indep in progressive HEP PROGRESSING  Pt able to wait at least 2.5-4 hours between trips to the bathroom PROGRESSING  Nocturia no more than 1x/night for improved quality of sleep PROGRESSING  Pt reports able to have painfree intercourse for improved participation in ADLs and improved intimacy with partner PROGRESSING  PT demo pelvic floor mm strength 3+/5 for improved urinary function MET    Plan      Plan of care Certification: 1/24/2022 to 4/22/22.     Outpatient Physical Therapy 1 times weekly for 12 weeks to include the following interventions: therapeutic exercises, therapeutic activity, neuromuscular re-education, manual therapy, patient/family education and self care/home management    Next visit: Check PF internally and review exercises    Carisa Koch, PT

## 2022-03-10 ENCOUNTER — CLINICAL SUPPORT (OUTPATIENT)
Dept: REHABILITATION | Facility: OTHER | Age: 37
End: 2022-03-10
Attending: INTERNAL MEDICINE
Payer: MEDICAID

## 2022-03-10 DIAGNOSIS — R27.9 LACK OF COORDINATION: ICD-10-CM

## 2022-03-10 DIAGNOSIS — M79.10 MYALGIA: Primary | ICD-10-CM

## 2022-03-10 PROCEDURE — 97110 THERAPEUTIC EXERCISES: CPT

## 2022-03-10 NOTE — PATIENT INSTRUCTIONS
"1) Pelvic Floor Exercises          A) Quick Flicks    Contract pelvic floor muscles by closing around your openings and lifting up and in. Try not to use abdominal or buttocks muscles, and do not hold your breath. Relax completely after each contraction.     Repeat 10 times. Perform 3 sets/day.              B) Long Holds    Contract pelvic floor muscles with about 50% of your maximal effort. Hold each squeeze for 10 seconds.     Repeat 5 times. Perform 3 sets/day.      2) Diaphragmatic Breathing        360 Breath - Inhale long, slow and deep. You should feel as if your lower ribs are expanding in all directions like the way an umbrella opens. You should feel the belly, back and sides gently expand and you may notice a relaxation in the pelvic floor.     Continue to breath like this for 10 breaths. Repeat 3 times/day.      3) Deep core/lower abdominals - tighten lower abdominals like firming a rubber band across front of hips. Can feel just inside hip bones for correct contraction. Can make "shhh" sound on exhale to feel for right muscles.    Keep this muscle tight as you lift and lower legs, alternating in to march     Perform 2 sets of 10 reps (10 per leg).     4) leg lifts with side plank - lay on side, bottom leg bent, top leg straight. Top of pelvis rolled forward and leg back in line with trunk. Lift into side plank. Lift and lower top leg while keeping it behind your trunk. 3 sets to 10.       5) Single Leg Glut Bridge - Lie on back with knees bent. Lift one leg off the ground. Activate deep core and then lift hips off the ground into the bridging position. Hold for 1 second and then lower back down. You should feel this movement in back and sides of glutes. Adjust feet as needed. Perform 2 sets of 10 per leg.         6) Single leg squat - stand on one leg. Squat back until bottom touches elevated surface behind you and then stand back up. Don't relax fully onto the elevated surface and keep glutes activated " the entire time. Don't let knee cave in.        6) Daily Meditation - Incorporate a 3-5 minute meditation each day.              - Insight wild for timed breathing or body scan              - Calm subscription

## 2022-03-15 ENCOUNTER — PATIENT MESSAGE (OUTPATIENT)
Dept: REHABILITATION | Facility: OTHER | Age: 37
End: 2022-03-15
Payer: MEDICAID

## 2022-03-22 ENCOUNTER — LAB VISIT (OUTPATIENT)
Dept: LAB | Facility: OTHER | Age: 37
End: 2022-03-22
Attending: INTERNAL MEDICINE
Payer: MEDICAID

## 2022-03-22 DIAGNOSIS — E21.3 HYPERPARATHYROIDISM: ICD-10-CM

## 2022-03-22 LAB
ALBUMIN SERPL BCP-MCNC: 4 G/DL (ref 3.5–5.2)
ALP SERPL-CCNC: 48 U/L (ref 55–135)
ALT SERPL W/O P-5'-P-CCNC: 18 U/L (ref 10–44)
ANION GAP SERPL CALC-SCNC: 8 MMOL/L (ref 8–16)
AST SERPL-CCNC: 19 U/L (ref 10–40)
BILIRUB SERPL-MCNC: 0.3 MG/DL (ref 0.1–1)
BUN SERPL-MCNC: 14 MG/DL (ref 6–20)
CA-I BLDV-SCNC: 1.21 MMOL/L (ref 1.06–1.42)
CALCIUM SERPL-MCNC: 8.8 MG/DL (ref 8.7–10.5)
CHLORIDE SERPL-SCNC: 107 MMOL/L (ref 95–110)
CO2 SERPL-SCNC: 22 MMOL/L (ref 23–29)
CREAT SERPL-MCNC: 0.8 MG/DL (ref 0.5–1.4)
EST. GFR  (AFRICAN AMERICAN): >60 ML/MIN/1.73 M^2
EST. GFR  (NON AFRICAN AMERICAN): >60 ML/MIN/1.73 M^2
GLUCOSE SERPL-MCNC: 93 MG/DL (ref 70–110)
POTASSIUM SERPL-SCNC: 3.8 MMOL/L (ref 3.5–5.1)
PROT SERPL-MCNC: 6.9 G/DL (ref 6–8.4)
PTH-INTACT SERPL-MCNC: 112.8 PG/ML (ref 9–77)
SODIUM SERPL-SCNC: 137 MMOL/L (ref 136–145)

## 2022-03-22 PROCEDURE — 83970 ASSAY OF PARATHORMONE: CPT | Performed by: INTERNAL MEDICINE

## 2022-03-22 PROCEDURE — 82330 ASSAY OF CALCIUM: CPT | Performed by: INTERNAL MEDICINE

## 2022-03-22 PROCEDURE — 36415 COLL VENOUS BLD VENIPUNCTURE: CPT | Performed by: INTERNAL MEDICINE

## 2022-03-22 PROCEDURE — 80053 COMPREHEN METABOLIC PANEL: CPT | Performed by: INTERNAL MEDICINE

## 2022-03-24 ENCOUNTER — CLINICAL SUPPORT (OUTPATIENT)
Dept: REHABILITATION | Facility: OTHER | Age: 37
End: 2022-03-24
Attending: OBSTETRICS & GYNECOLOGY
Payer: MEDICAID

## 2022-03-24 DIAGNOSIS — M79.10 MYALGIA: Primary | ICD-10-CM

## 2022-03-24 DIAGNOSIS — R27.9 LACK OF COORDINATION: ICD-10-CM

## 2022-03-24 PROCEDURE — 97110 THERAPEUTIC EXERCISES: CPT

## 2022-03-24 NOTE — PLAN OF CARE
[facility]  Physical Therapy Discharge Note    Name: Syl Elise  Clinic Number: [id number]    Therapy Diagnosis:   Encounter Diagnoses   Name Primary?    Myalgia Yes    Lack of coordination      Physician: Olivia Schofield MD    Physician Orders: PT Eval and Treat  Medical Diagnosis from Referral: myalgia  Evaluation Date: 1/24/2022      Date of Last visit: 3/24/22  Total Visits Received: 6    ASSESSMENT      Eboni has made significant progress with all her goals. She continues to have mild discomfort with deep penetration, but otherwise has no problems. Upon examination, pt has mild tension within the L pelvic floor, but reports no tenderness to palpation. Pt displayed significantly improved pelvic floor contraction with 4/5 strength and complete deactivation after contraction. Pt is independent with her home exercises and understands how to manage her symptoms after physical therapy. Provided patient with resources to find a pelvic health physical therapist in [place] if she ever has any problems. Pt has met a majority of her goals and no longer requires skilled PT services. Will discharge patient at this time. Pt agreeable.    Discharge reason: Patient is now asymptomatic, [place] out of state    Discharge FOTO Score: 4%    Goals:  Short Term Goals: 4 weeks   Pt indep in HEP MET  Pt indep in functional brace technique for decreased strain of pelvic structures with activities which increase IAP MET  Pt able to wait at least 2 hours between trips to the bathroom for improved participation in ADLs MET  Pt indep with urge suppression techniques to reduce urinary urgency and increase time between voids for improved participation in ADLs MET  PT demo pelvic floor mm strength 3/5 for improved urinary function MET  Pt demo complete contraction and deactivation of pelvic floor muscles x 10 reps MET        Long Term Goals: 8 weeks   Pt indep in progressive HEP MET  Pt able to wait at  least 2.5-4 hours between trips to the bathroom MET  Nocturia no more than 1x/night for improved quality of sleep MET  Pt reports able to have painfree intercourse for improved participation in ADLs and improved intimacy with partner PARTIALLY MET  PT demo pelvic floor mm strength 3+/5 for improved urinary function MET    PLAN   This patient is discharged from Physical Therapy      Carisa Koch, PT

## 2022-03-24 NOTE — PROGRESS NOTES
Pelvic Health Physical Therapy   Treatment Note     Name: Syl Elise  Clinic Number: [id number]    Therapy Diagnosis:   No diagnosis found.  Physician: Olivia Schofield MD    Visit Date: 3/24/2022    Physician Orders: PT Eval and Treat  Medical Diagnosis from Referral: myalgia  Evaluation Date: 1/24/2022  Authorization Period Expiration: 20 visits 2/2/22 to 12/31/22  Plan of Care Expiration: 4/22/22  Visit # / Visits authorized: 6/20    Cancelled Visits: 0  No Show Visits: 0  FOTO: 3 on 3/24/22 - 4% (13% at eval)    Time In: 206  Time Out: 255  Total Billable Time: 49 minutes    Precautions: Standard    Subjective     Pronouns: she/her    Pt reports: Moving officially on April 2nd to [place]. Symptoms are better. Low level urgency has been much better. Hip exercises have been helpful and challenging. Nocturia only 1 time a night. Able to wait at least 2.5 hours to go to the bathroom. No leakage with cough or sneeze. Pain with intercourse only a little bit, but has improved significantly. Only painful with deeper penetration.     She was compliant with home exercise program.  Response to previous treatment: felt fine  Functional change: improved    Pain: 0/10  Location:     Objective     Internal Pelvic Floor Assessment:  Pain: No tenderness to palpation  Sensation: able to localized pressure appropriately   Vaginal vault: WNL   Muscle Bulk: mild hypertonus R<L   Muscle Power: 4/5   Muscle Endurance: 10 sec  # Reps To Fatigue: NT             Fast Contractions in 10 seconds: NT                        Quality of contraction: complete relaxation after contraction  Prolapse check: none  Comments: deja Lyn received therapeutic exercises to develop strength and endurance for 49 minutes PFME: breathing, QFs x 10, LHs 10 sec on/5 sec rest x 3  SL Glut bridges > Hip thrusts  side plank hip abduction  TrA SL flexion > Dead bugs  SL squats   Passive stretching pelvic floor mm       Home Exercises Provided and Patient  Education Provided     Education provided:   - anatomy/physiology of pelvic floor, pelvic wand use and diaphragmatic breathing  Discussed progression of plan of care with patient; educated pt in activity modification; reviewed HEP with pt. Pt demonstrated and verbalized understanding of all instruction and was provided with a handout of HEP (see Patient Instructions).    Written Home Exercises Provided: yes.  Exercises were reviewed and Eboni was able to demonstrate them prior to the end of the session.  Eboni demonstrated good  understanding of the education provided.     See EMR under Patient Instructions for exercises provided 2/2/2022.    Assessment     Eboni has made significant progress with all her goals. She continues to have mild discomfort with deep penetration, but otherwise has no problems. Upon examination, pt has mild tension within the L pelvic floor, but reports no tenderness to palpation. Pt displayed significantly improved pelvic floor contraction with 4/5 strength and complete deactivation after contraction. Pt is independent with her home exercises and understands how to manage her symptoms after physical therapy. Provided patient with resources to find a pelvic health physical therapist in [place] if she ever has any problems. Pt has met a majority of her goals and no longer requires skilled PT services. Will discharge patient at this time. Pt agreeable.    Eboni Is progressing well towards her goals.   Pt prognosis is Excellent.     Pt will continue to benefit from skilled outpatient physical therapy to address the deficits listed in the problem list box on initial evaluation, provide pt/family education and to maximize pt's level of independence in the home and community environment.     Pt's spiritual, cultural and educational needs considered and pt agreeable to plan of care and goals.     Anticipated barriers to physical therapy: none    Goals: Short Term Goals: 4 weeks   Pt indep in HEP  "MET  Pt indep in functional brace technique for decreased strain of pelvic structures with activities which increase IAP MET  Pt able to wait at least 2 hours between trips to the bathroom for improved participation in ADLs MET  Pt indep with urge suppression techniques to reduce urinary urgency and increase time between voids for improved participation in ADLs MET  PT demo pelvic floor mm strength 3/5 for improved urinary function MET  Pt demo complete contraction and deactivation of pelvic floor muscles x 10 reps MET        Long Term Goals: 8 weeks   Pt indep in progressive HEP MET  Pt able to wait at least 2.5-4 hours between trips to the bathroom MET  Nocturia no more than 1x/night for improved quality of sleep MET  Pt reports able to have painfree intercourse for improved participation in ADLs and improved intimacy with partner PARTIALLY MET  PT demo pelvic floor mm strength 3+/5 for improved urinary function MET    Plan      Plan of care Certification: 1/24/2022 to 4/22/22.     Outpatient Physical Therapy 1 times weekly for 12 weeks to include the following interventions: therapeutic exercises, therapeutic activity, neuromuscular re-education, manual therapy, patient/family education and self care/home management    Plan: Discharge    Antoinette Brand, SPT    "I certify that I was present in the room directing the student and service delivery and guiding them using my skilled judgement. As the co-signing therapist, I have reviewed the student's documentation, and am responsible for the treatment, assessment and plan."    Carisa Koch, PT, DPT    "

## 2022-03-24 NOTE — PATIENT INSTRUCTIONS
"1) Pelvic Floor Exercises          A) Quick Flicks    Contract pelvic floor muscles by closing around your openings and lifting up and in. Try not to use abdominal or buttocks muscles, and do not hold your breath. Relax completely after each contraction.     Repeat 10 times. Perform 3 sets/day.              B) Long Holds    Contract pelvic floor muscles with about 50% of your maximal effort. Hold each squeeze for 10 seconds.     Repeat 5 times. Perform 3 sets/day.      2) Diaphragmatic Breathing        360 Breath - Inhale long, slow and deep. You should feel as if your lower ribs are expanding in all directions like the way an umbrella opens. You should feel the belly, back and sides gently expand and you may notice a relaxation in the pelvic floor.     Continue to breath like this for 10 breaths. Repeat 3 times/day.      3) Deep core/lower abdominals - tighten lower abdominals like firming a rubber band across front of hips. Can feel just inside hip bones for correct contraction. Can make "shhh" sound on exhale to feel for right muscles.    Keep this muscle tight as you lift and lower one leg. Can progress to modified dead bugs with both legs bent in the air and straightening one out and then back in. May have to use entire core for the progression so make sure to "zip up" the entire stomach and control the pressure.     Progression >   Perform 2 sets of 10 reps (10 per leg).     4) leg lifts with side plank - lay on side, bottom leg bent, top leg straight. Top of pelvis rolled forward and leg back in line with trunk. Lift into side plank. Lift and lower top leg while keeping it behind your trunk. 3 sets to 10.       5) Single Leg Glut Bridge - Lie on back with knees bent. Lift one leg off the ground. Activate deep core and then lift hips off the ground into the bridging position. Hold for 1 second and then lower back down. You should feel this movement in back and sides of glutes. Adjust feet as needed. Perform 2 " sets of 10 per leg. Progress to hip thrust when ready. Can do double leg with weight or single leg.     Alternate >    6) Single leg squat - stand on one leg. Squat back until bottom touches elevated surface behind you and then stand back up. Don't relax fully onto the elevated surface and keep glutes activated the entire time. Don't let knee cave in.     6) Daily Meditation - Incorporate a 3-5 minute meditation each day.              - Insight wild for timed breathing or body scan              - Calm subscription      7) Pelvic Wand - use pelvic wand weekly as needed. Especially after a hard workout or anything that may increase tension within your pelvic floor (stress).

## 2022-03-28 ENCOUNTER — OFFICE VISIT (OUTPATIENT)
Dept: ENDOCRINOLOGY | Facility: CLINIC | Age: 37
End: 2022-03-28
Payer: MEDICAID

## 2022-03-28 VITALS
BODY MASS INDEX: 21.52 KG/M2 | HEART RATE: 81 BPM | DIASTOLIC BLOOD PRESSURE: 69 MMHG | HEIGHT: 61 IN | SYSTOLIC BLOOD PRESSURE: 113 MMHG | WEIGHT: 114 LBS

## 2022-03-28 DIAGNOSIS — M85.80 LOW BONE MASS: ICD-10-CM

## 2022-03-28 DIAGNOSIS — E21.3 HYPERPARATHYROIDISM: Primary | ICD-10-CM

## 2022-03-28 DIAGNOSIS — E55.9 VITAMIN D INSUFFICIENCY: ICD-10-CM

## 2022-03-28 PROCEDURE — 3008F BODY MASS INDEX DOCD: CPT | Mod: CPTII,S$GLB,, | Performed by: INTERNAL MEDICINE

## 2022-03-28 PROCEDURE — 1159F MED LIST DOCD IN RCRD: CPT | Mod: CPTII,S$GLB,, | Performed by: INTERNAL MEDICINE

## 2022-03-28 PROCEDURE — 1160F PR REVIEW ALL MEDS BY PRESCRIBER/CLIN PHARMACIST DOCUMENTED: ICD-10-PCS | Mod: CPTII,S$GLB,, | Performed by: INTERNAL MEDICINE

## 2022-03-28 PROCEDURE — 3074F PR MOST RECENT SYSTOLIC BLOOD PRESSURE < 130 MM HG: ICD-10-PCS | Mod: CPTII,S$GLB,, | Performed by: INTERNAL MEDICINE

## 2022-03-28 PROCEDURE — 3008F PR BODY MASS INDEX (BMI) DOCUMENTED: ICD-10-PCS | Mod: CPTII,S$GLB,, | Performed by: INTERNAL MEDICINE

## 2022-03-28 PROCEDURE — 1160F RVW MEDS BY RX/DR IN RCRD: CPT | Mod: CPTII,S$GLB,, | Performed by: INTERNAL MEDICINE

## 2022-03-28 PROCEDURE — 1159F PR MEDICATION LIST DOCUMENTED IN MEDICAL RECORD: ICD-10-PCS | Mod: CPTII,S$GLB,, | Performed by: INTERNAL MEDICINE

## 2022-03-28 PROCEDURE — 3078F PR MOST RECENT DIASTOLIC BLOOD PRESSURE < 80 MM HG: ICD-10-PCS | Mod: CPTII,S$GLB,, | Performed by: INTERNAL MEDICINE

## 2022-03-28 PROCEDURE — 3074F SYST BP LT 130 MM HG: CPT | Mod: CPTII,S$GLB,, | Performed by: INTERNAL MEDICINE

## 2022-03-28 PROCEDURE — 99214 OFFICE O/P EST MOD 30 MIN: CPT | Mod: S$GLB,,, | Performed by: INTERNAL MEDICINE

## 2022-03-28 PROCEDURE — 3078F DIAST BP <80 MM HG: CPT | Mod: CPTII,S$GLB,, | Performed by: INTERNAL MEDICINE

## 2022-03-28 PROCEDURE — 99214 PR OFFICE/OUTPT VISIT, EST, LEVL IV, 30-39 MIN: ICD-10-PCS | Mod: S$GLB,,, | Performed by: INTERNAL MEDICINE

## 2022-03-28 NOTE — ASSESSMENT & PLAN NOTE
Potentially primary hyperparathyroidism   - Has order in already for 24 urine calcium to r/o FHH, twice. Still not done. Try again.   - DXA with low bone mass.   - vit D was borderline. increased vit D, then had level very high with PCP. Weird to change that quickly.    - recheck labs in a few months   - though if 24 hr urine is not suggestive of FHH, discussed with pt that she would meet criteria for surgery if primary hyperparathyroidism (age, low bone density).   therefore, next step after urine evaluation would be imaging and consideration for surgery referral.    Avoid dehydration, excessive calcium supplementation and meds (HCTZ) that can worsen hypercalcemia.

## 2022-03-28 NOTE — PATIENT INSTRUCTIONS
For parathyroid and bones:    Need 24 hour urine testing when able.   Then likely neck imaging, potentially surgery evaluation if looking like primary hyperparathyroidism.    Agree with holding off on the vitamin D prescription.   Recheck blood test in about 2 months or so    Avoid falls

## 2022-03-28 NOTE — ASSESSMENT & PLAN NOTE
Pt notes hx osteopenia since her teens    Reviewed basics of bone quantity versus quality  Reassuring she is not fracturing lately.    So far, evaluation negative except for high PTH. Evaluation as per other section.    Risks include low weight,  race, hx eating disorder, 1.5 years without any menstrual cycles, potentially malabsorption, family history.    Recommend pt have sufficient calcium (6939-9819 mg/day in divided doses) and vitamin D (2000 units a day), calcium from food sources preferred but OTC supplementation okay if needed to reach goal amounts.  Fall precautions emphasized  Encourage weight bearing exercises      Had reviewed that given she is pre-menopausal, most of the guidelines/recommendations about treatment do not necessarily match her situation. More often would go by z-scores rather than t-score (low if under -2). Her Zscore was -1.8 for hip, -0.7 for spine, within expected range for age, admittedly on the lower side. with +fx history, can make an argument to treat. If treatment needed, would lean towards 1-2 years of anabolic agent then a few doses of reclast, then back to monitoring.     But if hyperparathyroidism is driving this, better to address that.

## 2022-03-28 NOTE — ASSESSMENT & PLAN NOTE
Had some low vit D in the past   last level normal here but still on the lower side   had labs with PCP, vit D high.      Hold off on vitamin D for now   recheck in a couple of months

## 2022-03-28 NOTE — PROGRESS NOTES
Subjective:      Chief Complaint: Hyperparathyroidism    HPI: Syl Elise is a 36 y.o. female who is here for a follow-up evaluation for bones. Last seen 10/18/2021    With regards to osteoporosis:   Pt diagnosed with osteoporosis on bone density scan from: 2021     Pt notes hx eating disorder as a teen.  Osteopenia/low BMD at that time.     Had BMD over the years, stays low.     Fractures: yes. Few stress fractures. Some foot fractures, hx rib fracture. 5-6 years ago. More often with running, not doing that as much lately.    Last bone density: 7/27/2021   Hip: t-score -2.5 (left fem neck), z-score -1.8. 5% worse   Spine: t-score -1.2 (zscore -0.7). decline 2.6%     Prior scan 6/2018:   spine zscore -0.4   left fem neck -1.6 zscore   right fem neck -1.5 zscore     Osteoporosis Risk Factor Assessment:  No   Yes  [x]?    []?            Her menstrual cycles have been normal throughout her reproductive life. No periods for a year or two with eating disorder  [x]?    []?            Prior use of hormone replacement therapy  [x]?    []?            fractures to her wrist, hip or spine  [x]?    []?            loss of height of more than 1 1/2 - 2 inches   []?    [x]?            family history of osteoporosis, stooped posture, or fractures of hip.  [x]?    []?            Kidney stones, kidney disease  [x]?    []?            Thyroid disease  [x]?    []?            Medication exposure: steroids, anticoagulants, proton pump inhibitors or anti-seizure medications  [x]?    []?            Tobacco use, including use in the past.  []?    [x]?            EtOH use, rare  [x]?    []?            Active malignancy, history of bone malignancy, or prior radiation treatment           Lab Results   Component Value Date     CALCIUM 8.6 (L) 07/29/2021     ALBUMIN 3.9 07/29/2021     CREATININE 0.7 07/29/2021     [id number] 36 07/29/2021            Lab Results   Component Value Date     TSH 1.879 02/15/2021       , normal  NTX  9.6, normal  Estradiol, LH, FSH normal.    Treatment:  Osteoporosis medications used in the past: actonel for about a year     Current medication: none    Vitamins:  Calcium intake? Taking a calcium and vitamin D combination. Not sure the strength     Weight bearing exercise? Walking, yoga, weights sometimes.     Interval history:    Last labs:  Lab Results   Component Value Date    CALCIUM 8.8 03/22/2022    ALBUMIN 4.0 03/22/2022    CREATININE 0.8 03/22/2022    [id number] 30 01/19/2022    .8 (H) 03/22/2022     24 hr urine still not done.    Pt reports had labs with PCP, vitamin D 100s. Stopped vitamin D. PTH 47 on that test.    Today, pt reports feeling okay overall.  No recent falls or fractures.  Had covid Feb 2022. Some symptoms lingering    Occasional palpitations.  Still insomnia.  fatigue  No polyuria, polydipsia  No kidney stones. Hx US 10/2020, no stones seen on that.  No constipation lately.  Gained a few lbs, fairly stable  Cycles regular  No other acute concerns today.    Reviewed past medical, family, social history and updated as appropriate.    Review of Systems  As above    Objective:     Vitals:    03/28/22 0910   BP: 113/69   Pulse: 81     BP Readings from Last 5 Encounters:   03/28/22 113/69   10/18/21 115/66   08/20/21 115/70   08/18/21 114/70   08/05/21 120/70     Physical Exam  Vitals reviewed.   Constitutional:       General: She is not in acute distress.  Neck:      Thyroid: No thyromegaly.   Cardiovascular:      Heart sounds: Normal heart sounds.   Pulmonary:      Effort: Pulmonary effort is normal.       Wt Readings from Last 10 Encounters:   03/28/22 0910 51.7 kg (114 lb)   10/18/21 0840 52 kg (114 lb 10.2 oz)   08/20/21 0940 48.1 kg (106 lb)   08/18/21 1050 48.3 kg (106 lb 7.7 oz)   08/05/21 0907 49.6 kg (109 lb 5.6 oz)   06/21/21 1443 49.4 kg (109 lb)   04/29/21 1147 50 kg (110 lb 3.7 oz)   01/22/21 1019 50.3 kg (110 lb 14.3 oz)   12/21/20 1014 48.8 kg (107 lb 9.4 oz)   10/28/20  0813 48.5 kg (106 lb 14.8 oz)     Lab Results   Component Value Date    CHOL 141 07/13/2018    HDL 73 07/13/2018    LDLCALC 63.0 07/13/2018    TRIG 25 (L) 07/13/2018    CHOLHDL 51.8 (H) 07/13/2018     Lab Results   Component Value Date     03/22/2022    K 3.8 03/22/2022     03/22/2022    CO2 22 (L) 03/22/2022    GLU 93 03/22/2022    BUN 14 03/22/2022    CREATININE 0.8 03/22/2022    CALCIUM 8.8 03/22/2022    PROT 6.9 03/22/2022    ALBUMIN 4.0 03/22/2022    BILITOT 0.3 03/22/2022    ALKPHOS 48 (L) 03/22/2022    AST 19 03/22/2022    ALT 18 03/22/2022    ANIONGAP 8 03/22/2022    ESTGFRAFRICA >60 03/22/2022    EGFRNONAA >60 03/22/2022    TSH 2.260 10/12/2021     Assessment/Plan:     Low bone mass  Pt notes hx osteopenia since her teens    Reviewed basics of bone quantity versus quality  Reassuring she is not fracturing lately.    So far, evaluation negative except for high PTH. Evaluation as per other section.    Risks include low weight,  race, hx eating disorder, 1.5 years without any menstrual cycles, potentially malabsorption, family history.    Recommend pt have sufficient calcium (7734-5589 mg/day in divided doses) and vitamin D (2000 units a day), calcium from food sources preferred but OTC supplementation okay if needed to reach goal amounts.  Fall precautions emphasized  Encourage weight bearing exercises      Had reviewed that given she is pre-menopausal, most of the guidelines/recommendations about treatment do not necessarily match her situation. More often would go by z-scores rather than t-score (low if under -2). Her Zscore was -1.8 for hip, -0.7 for spine, within expected range for age, admittedly on the lower side. with +fx history, can make an argument to treat. If treatment needed, would lean towards 1-2 years of anabolic agent then a few doses of reclast, then back to monitoring.     But if hyperparathyroidism is driving this, better to address  that.      Hyperparathyroidism  Potentially primary hyperparathyroidism   - Has order in already for 24 urine calcium to r/o FHH, twice. Still not done. Try again.   - DXA with low bone mass.   - vit D was borderline. increased vit D, then had level very high with PCP. Weird to change that quickly.    - recheck labs in a few months   - though if 24 hr urine is not suggestive of FHH, discussed with pt that she would meet criteria for surgery if primary hyperparathyroidism (age, low bone density).   therefore, next step after urine evaluation would be imaging and consideration for surgery referral.    Avoid dehydration, excessive calcium supplementation and meds (HCTZ) that can worsen hypercalcemia.      Vitamin D insufficiency  Had some low vit D in the past   last level normal here but still on the lower side   had labs with PCP, vit D high.      Hold off on vitamin D for now   recheck in a couple of months      Follow up in about 4 months (around 7/28/2022) for further monitoring, lab review, imaging review.      Samuel Llanes MD  Endocrinology

## 2024-02-20 ENCOUNTER — PATIENT OUTREACH (OUTPATIENT)
Dept: ADMINISTRATIVE | Facility: HOSPITAL | Age: 39
End: 2024-02-20
Payer: MEDICAID

## 2024-02-20 NOTE — PROGRESS NOTES
Health Maintenance Due   Topic Date Due    HIV Screening  Never done    Mammogram  Never done    Influenza Vaccine (1) 09/01/2023    COVID-19 Vaccine (5 - 2023-24 season) 09/01/2023    Mammogram needed and a office visit. Will call to schedule appointments @ 0800 have not been active on portal since 2/2023

## 2024-04-23 ENCOUNTER — PATIENT MESSAGE (OUTPATIENT)
Dept: INTERNAL MEDICINE | Facility: CLINIC | Age: 39
End: 2024-04-23
Payer: MEDICAID